# Patient Record
Sex: MALE
[De-identification: names, ages, dates, MRNs, and addresses within clinical notes are randomized per-mention and may not be internally consistent; named-entity substitution may affect disease eponyms.]

---

## 2017-05-18 ENCOUNTER — HOSPITAL (OUTPATIENT)
Dept: OTHER | Age: 72
End: 2017-05-18
Attending: SPECIALIST

## 2017-12-05 ENCOUNTER — HOSPITAL (OUTPATIENT)
Dept: OTHER | Age: 72
End: 2017-12-05
Attending: SPECIALIST

## 2018-01-01 ENCOUNTER — HOSPITAL (OUTPATIENT)
Dept: OTHER | Age: 73
End: 2018-01-01
Attending: SPECIALIST

## 2018-02-10 ENCOUNTER — HOSPITAL (OUTPATIENT)
Dept: OTHER | Age: 73
End: 2018-02-10
Attending: SPECIALIST

## 2018-04-10 ENCOUNTER — CHARTING TRANS (OUTPATIENT)
Dept: OTHER | Age: 73
End: 2018-04-10

## 2018-04-10 ENCOUNTER — IMAGING SERVICES (OUTPATIENT)
Dept: OTHER | Age: 73
End: 2018-04-10

## 2018-04-19 ENCOUNTER — HOSPITAL (OUTPATIENT)
Dept: OTHER | Age: 73
End: 2018-04-19
Attending: NEUROLOGICAL SURGERY

## 2018-05-01 ENCOUNTER — HOSPITAL (OUTPATIENT)
Dept: OTHER | Age: 73
End: 2018-05-01
Attending: NEUROLOGICAL SURGERY

## 2018-05-01 ENCOUNTER — CHARTING TRANS (OUTPATIENT)
Dept: OTHER | Age: 73
End: 2018-05-01

## 2018-05-22 ENCOUNTER — TELEPHONE (OUTPATIENT)
Dept: SURGERY | Facility: CLINIC | Age: 73
End: 2018-05-22

## 2018-05-22 ENCOUNTER — OFFICE VISIT (OUTPATIENT)
Dept: SURGERY | Facility: CLINIC | Age: 73
End: 2018-05-22

## 2018-05-22 VITALS — SYSTOLIC BLOOD PRESSURE: 132 MMHG | DIASTOLIC BLOOD PRESSURE: 82 MMHG | HEART RATE: 84 BPM

## 2018-05-22 DIAGNOSIS — G56.01 CARPAL TUNNEL SYNDROME OF RIGHT WRIST: Primary | ICD-10-CM

## 2018-05-22 DIAGNOSIS — G56.21 ULNAR NEUROPATHY AT ELBOW, RIGHT: ICD-10-CM

## 2018-05-22 DIAGNOSIS — E08.42 DIABETIC POLYNEUROPATHY ASSOCIATED WITH DIABETES MELLITUS DUE TO UNDERLYING CONDITION (HCC): ICD-10-CM

## 2018-05-22 DIAGNOSIS — G56.00 CARPAL TUNNEL SYNDROME, UNSPECIFIED LATERALITY: ICD-10-CM

## 2018-05-22 DIAGNOSIS — G56.20 ULNAR NEUROPATHY, UNSPECIFIED LATERALITY: ICD-10-CM

## 2018-05-22 PROBLEM — E11.40 DIABETIC NEUROPATHY (HCC): Status: ACTIVE | Noted: 2018-05-22

## 2018-05-22 PROCEDURE — 99243 OFF/OP CNSLTJ NEW/EST LOW 30: CPT | Performed by: NEUROLOGICAL SURGERY

## 2018-05-22 RX ORDER — DAPAGLIFLOZIN 10 MG/1
10 TABLET, FILM COATED ORAL DAILY
Refills: 5 | Status: ON HOLD | COMMUNITY
Start: 2018-05-02 | End: 2020-10-19

## 2018-05-22 RX ORDER — LOSARTAN POTASSIUM 100 MG/1
100 TABLET ORAL DAILY
Refills: 0 | COMMUNITY
End: 2020-03-31

## 2018-05-22 RX ORDER — FINASTERIDE 5 MG/1
5 TABLET, FILM COATED ORAL DAILY
Refills: 0 | COMMUNITY
End: 2020-01-22 | Stop reason: ALTCHOICE

## 2018-05-22 RX ORDER — INSULIN GLARGINE 300 U/ML
60 INJECTION, SOLUTION SUBCUTANEOUS EVERY MORNING
Refills: 1 | COMMUNITY
Start: 2018-04-09 | End: 2020-06-16

## 2018-05-22 RX ORDER — HYDROCODONE BITARTRATE AND ACETAMINOPHEN 5; 325 MG/1; MG/1
1 TABLET ORAL AS NEEDED
Refills: 0 | Status: ON HOLD | COMMUNITY
End: 2018-06-20

## 2018-05-22 RX ORDER — IBUPROFEN 800 MG/1
800 TABLET ORAL AS NEEDED
Refills: 0 | Status: ON HOLD | COMMUNITY
End: 2018-06-20

## 2018-05-22 RX ORDER — ERGOCALCIFEROL 1.25 MG/1
50000 CAPSULE ORAL WEEKLY
Refills: 3 | COMMUNITY
End: 2021-04-26

## 2018-05-22 RX ORDER — ATORVASTATIN CALCIUM 10 MG/1
10 TABLET, FILM COATED ORAL DAILY
Refills: 0 | COMMUNITY
End: 2020-03-31

## 2018-05-22 RX ORDER — CALCIPOTRIENE 50 UG/G
1 CREAM TOPICAL DAILY
Refills: 10 | COMMUNITY
Start: 2018-05-10 | End: 2019-05-14

## 2018-05-22 RX ORDER — INSULIN ASPART 100 [IU]/ML
20 INJECTION, SOLUTION INTRAVENOUS; SUBCUTANEOUS 2 TIMES DAILY
COMMUNITY
End: 2020-06-16

## 2018-05-22 NOTE — TELEPHONE ENCOUNTER
Called and spoke with patient  Discussed options  Wants emg of left arm  Order placed  Please schedule with pt  Pt to f/u the week after emg  Left message with son as well

## 2018-05-22 NOTE — H&P
Neurosurgery Clinic Visit  2018    Jazmine Figueroa PCP:  Preeti Shaver MD    1945 MRN [id number]       CHIEF COMPLAINT:  Patient presents with:  Neck Pain: NP      HISTORY OF PRESENT ILLNESS:  Jazmine Figueroa is a(n) 71year old male who w Vitro Strip TEST TID Disp:  Rfl: 3   Dulaglutide (TRULICITY SC) Inject into the skin once a week. Disp:  Rfl:    insulin aspart (NOVOLOG) 100 UNIT/ML Subcutaneous Solution Inject 20 Units into the skin 2 (two) times daily.  Disp:  Rfl:    aspirin 81 MG Oral Finger-thumb opposition   Left 5/5 5/5 5/5 5/5 5/5 5/5   Right 5/5 5/5 5/5 4+/5 4-/5 4-/5      Hip Flexion Knee Flexion Knee Extension Plantar- flexion Dorsi- flexion EHL   Left 5/5 5/5 5/5 5/5 5/5 5/5   Right 5/5 5/5 5/5 5/5 5/5 5/5     DTRs:   Biceps Tri agreement with the plan. Total time spent: 45 Minutes  Greater than 50% of the time was spent on counseling/coordination of care. Nature of education / counseling: Pathology, treatment options, and expected outcomes. Lacy Nj M.S., PAPreetiC

## 2018-05-22 NOTE — PROGRESS NOTES
Location of Pain: No neck pain, right hand weakness/grib strength    Date Pain Began: 6 months          Work Related:   No        Receiving Work Comp/Disability:   No    Numeric Rating Scale:  Pain at Present:  0

## 2018-05-22 NOTE — PATIENT INSTRUCTIONS
Refill policies:    • Allow 2-3 business days for refills; controlled substances may take longer.   • Contact your pharmacy at least 5 days prior to running out of medication and have them send an electronic request or submit request through the “request re entire amount billed. Precertification and Prior Authorizations: If your physician has recommended that you have a procedure or additional testing performed.   [facility]) will contact your insurance carrier to obtain pre-certi

## 2018-05-23 ENCOUNTER — TELEPHONE (OUTPATIENT)
Dept: SURGERY | Facility: CLINIC | Age: 73
End: 2018-05-23

## 2018-05-23 ENCOUNTER — APPOINTMENT (OUTPATIENT)
Dept: LAB | Age: 73
End: 2018-05-23
Attending: SPECIALIST
Payer: COMMERCIAL

## 2018-05-23 DIAGNOSIS — I10 HTN (HYPERTENSION): ICD-10-CM

## 2018-05-23 DIAGNOSIS — G56.00 CARPAL TUNNEL SYNDROME, UNSPECIFIED LATERALITY: Primary | ICD-10-CM

## 2018-05-23 DIAGNOSIS — G56.20 ULNAR NEUROPATHY, UNSPECIFIED LATERALITY: ICD-10-CM

## 2018-05-23 DIAGNOSIS — E78.2 MIXED HYPERLIPIDEMIA: ICD-10-CM

## 2018-05-23 DIAGNOSIS — E11.65 TYPE II DIABETES MELLITUS, UNCONTROLLED (HCC): ICD-10-CM

## 2018-05-23 PROCEDURE — 80053 COMPREHEN METABOLIC PANEL: CPT

## 2018-05-23 PROCEDURE — 82043 UR ALBUMIN QUANTITATIVE: CPT

## 2018-05-23 PROCEDURE — 82570 ASSAY OF URINE CREATININE: CPT

## 2018-05-23 PROCEDURE — 36415 COLL VENOUS BLD VENIPUNCTURE: CPT

## 2018-05-23 PROCEDURE — 80061 LIPID PANEL: CPT

## 2018-05-23 PROCEDURE — 83036 HEMOGLOBIN GLYCOSYLATED A1C: CPT

## 2018-05-23 NOTE — TELEPHONE ENCOUNTER
You are scheduled for RIGHT CARPAL TUNNEL RELEASE AND ULNAR DECOMPRESSION, POSSIBLE TRANSPOSITION AND ALL INDICATED PROCEDURES on 6/20/18 with .     Pre-op instructions discussed with patient and surgical packet provided:       · You will need to you should avoid pressure to the palm/wrist.       Patient has Ryan Jarvis. PA needed.   CPT: [id number], [id number]  ICD-10: G56.01, G56.21

## 2018-05-23 NOTE — TELEPHONE ENCOUNTER
Will need to verify with  if patient needs to obtain PCP clearance. Patient would also like below instructions to be printed out and will pick this up on Friday. Instructions placed at the .

## 2018-05-25 NOTE — TELEPHONE ENCOUNTER
Pt returned call informed him of message below, pt had questions regarding upcoming sx.     Pt is worried about post operative pain, informed him medication will be given to help him with this   Also informed pt to keep wrist elevated, and not place it in d

## 2018-05-30 NOTE — TELEPHONE ENCOUNTER
Called pt & scheduled post op for 07/02 w/Jackeline; pt also requested an appt prior to sx so appt scheduled for 06/14 w/Dilia.

## 2018-06-01 ENCOUNTER — HOSPITAL (OUTPATIENT)
Dept: OTHER | Age: 73
End: 2018-06-01
Attending: NEUROLOGICAL SURGERY

## 2018-06-07 ENCOUNTER — LAB ENCOUNTER (OUTPATIENT)
Dept: LAB | Age: 73
End: 2018-06-07
Attending: SPECIALIST
Payer: COMMERCIAL

## 2018-06-07 DIAGNOSIS — R94.31 NONSPECIFIC ABNORMAL ELECTROCARDIOGRAM (ECG) (EKG): Primary | ICD-10-CM

## 2018-06-07 PROCEDURE — 36415 COLL VENOUS BLD VENIPUNCTURE: CPT

## 2018-06-07 PROCEDURE — 85610 PROTHROMBIN TIME: CPT

## 2018-06-07 PROCEDURE — 85730 THROMBOPLASTIN TIME PARTIAL: CPT

## 2018-06-07 PROCEDURE — 85025 COMPLETE CBC W/AUTO DIFF WBC: CPT

## 2018-06-08 ENCOUNTER — TELEPHONE (OUTPATIENT)
Dept: SURGERY | Facility: CLINIC | Age: 73
End: 2018-06-08

## 2018-06-08 NOTE — TELEPHONE ENCOUNTER
Spoke with patient who stated he has a cardiac test scheduled for 6/13/18 and that he already completed his pre-op bloodwork. Patient will be f/u in the office with  prior to surgery on 6/14/18. No further questions.     Awaiting medical clear

## 2018-06-13 ENCOUNTER — HOSPITAL ENCOUNTER (OUTPATIENT)
Dept: CV DIAGNOSTICS | Facility: HOSPITAL | Age: 73
Discharge: HOME | End: 2018-06-13
Attending: SPECIALIST
Payer: COMMERCIAL

## 2018-06-13 DIAGNOSIS — R94.31 NONSPECIFIC ABNORMAL ELECTROCARDIOGRAM (ECG) (EKG): ICD-10-CM

## 2018-06-13 DIAGNOSIS — R94.31 ABNORMAL EKG: ICD-10-CM

## 2018-06-13 DIAGNOSIS — Z01.810 PREOP CARDIOVASCULAR EXAM: ICD-10-CM

## 2018-06-13 PROCEDURE — 78452 HT MUSCLE IMAGE SPECT MULT: CPT | Performed by: SPECIALIST

## 2018-06-13 PROCEDURE — 93018 CV STRESS TEST I&R ONLY: CPT | Performed by: SPECIALIST

## 2018-06-13 PROCEDURE — 93017 CV STRESS TEST TRACING ONLY: CPT | Performed by: SPECIALIST

## 2018-06-14 ENCOUNTER — OFFICE VISIT (OUTPATIENT)
Dept: SURGERY | Facility: CLINIC | Age: 73
End: 2018-06-14

## 2018-06-14 VITALS — DIASTOLIC BLOOD PRESSURE: 60 MMHG | HEART RATE: 80 BPM | SYSTOLIC BLOOD PRESSURE: 130 MMHG

## 2018-06-14 DIAGNOSIS — G56.00 CARPAL TUNNEL SYNDROME, UNSPECIFIED LATERALITY: Primary | ICD-10-CM

## 2018-06-14 DIAGNOSIS — G56.20 ULNAR NEUROPATHY, UNSPECIFIED LATERALITY: ICD-10-CM

## 2018-06-14 PROCEDURE — 99213 OFFICE O/P EST LOW 20 MIN: CPT | Performed by: NEUROLOGICAL SURGERY

## 2018-06-14 NOTE — PATIENT INSTRUCTIONS
Refill policies:    • Allow 2-3 business days for refills; controlled substances may take longer.   • Contact your pharmacy at least 5 days prior to running out of medication and have them send an electronic request or submit request through the “request re entire amount billed. Precertification and Prior Authorizations: If your physician has recommended that you have a procedure or additional testing performed.   [facility]) will contact your insurance carrier to obtain pre-certi

## 2018-06-14 NOTE — TELEPHONE ENCOUNTER
Attempted to speak with PCP-'s office. Per 's office patient was seen, but notes not yet complete. Office fax # was provided and will fax once complete. Awaiting PCP clearance note.

## 2018-06-14 NOTE — PROGRESS NOTES
Neurosurgery Clinic Visit  2018    Tariq Campos PCP:  Nikole Goodwin MD    1945 MRN [id number]     HISTORY OF PRESENT ILLNESS:  Velia Smith is a(n) 71year old male here with carpal tunnel as well as ulnar neuropathy  He is here to d

## 2018-06-15 NOTE — TELEPHONE ENCOUNTER
Spoke with 's office regarding clearance. Who stated note still not yet completed. Informed her surgery is next week. They will relay message to Rogelio Guzman. Awaiting clearance.

## 2018-06-18 NOTE — TELEPHONE ENCOUNTER
Patient states he called his PCP office and they are going to inform Dr. Federico Johnson about the clearance for surgery.

## 2018-06-18 NOTE — TELEPHONE ENCOUNTER
Called Dr. Brannon Pandey, Spoke to Brennon informed them we are still waiting on patient' medical clearance for surgery on Wednesday. Provided fax # to our office and requested they fax Medical clearance as son as possible.      Called pt informed hi

## 2018-06-19 NOTE — TELEPHONE ENCOUNTER
Letter stating medically cleared for surgery faxed to Pre-admission  Nothing further needed for this surgery

## 2018-06-20 ENCOUNTER — HOSPITAL ENCOUNTER (OUTPATIENT)
Facility: HOSPITAL | Age: 73
Setting detail: HOSPITAL OUTPATIENT SURGERY
Discharge: HOME | End: 2018-06-20
Attending: NEUROLOGICAL SURGERY | Admitting: NEUROLOGICAL SURGERY
Payer: COMMERCIAL

## 2018-06-20 ENCOUNTER — ANESTHESIA EVENT (OUTPATIENT)
Dept: SURGERY | Facility: HOSPITAL | Age: 73
End: 2018-06-20

## 2018-06-20 ENCOUNTER — SURGERY (OUTPATIENT)
Age: 73
End: 2018-06-20

## 2018-06-20 ENCOUNTER — ANESTHESIA (OUTPATIENT)
Dept: SURGERY | Facility: HOSPITAL | Age: 73
End: 2018-06-20

## 2018-06-20 VITALS
DIASTOLIC BLOOD PRESSURE: 51 MMHG | BODY MASS INDEX: 37.52 KG/M2 | HEART RATE: 81 BPM | OXYGEN SATURATION: 93 % | SYSTOLIC BLOOD PRESSURE: 106 MMHG | WEIGHT: 219.81 LBS | RESPIRATION RATE: 16 BRPM | HEIGHT: 64 IN | TEMPERATURE: 97 F

## 2018-06-20 DIAGNOSIS — G56.20 ULNAR NEUROPATHY, UNSPECIFIED LATERALITY: ICD-10-CM

## 2018-06-20 DIAGNOSIS — G56.00 CARPAL TUNNEL SYNDROME, UNSPECIFIED LATERALITY: ICD-10-CM

## 2018-06-20 PROCEDURE — 82962 GLUCOSE BLOOD TEST: CPT

## 2018-06-20 PROCEDURE — 01N50ZZ RELEASE MEDIAN NERVE, OPEN APPROACH: ICD-10-PCS | Performed by: NEUROLOGICAL SURGERY

## 2018-06-20 RX ORDER — BUPIVACAINE HYDROCHLORIDE 2.5 MG/ML
INJECTION, SOLUTION EPIDURAL; INFILTRATION; INTRACAUDAL AS NEEDED
Status: DISCONTINUED | OUTPATIENT
Start: 2018-06-20 | End: 2018-06-20 | Stop reason: HOSPADM

## 2018-06-20 RX ORDER — DIPHENHYDRAMINE HYDROCHLORIDE 50 MG/ML
12.5 INJECTION INTRAMUSCULAR; INTRAVENOUS AS NEEDED
Status: DISCONTINUED | OUTPATIENT
Start: 2018-06-20 | End: 2018-06-20

## 2018-06-20 RX ORDER — NALOXONE HYDROCHLORIDE 0.4 MG/ML
80 INJECTION, SOLUTION INTRAMUSCULAR; INTRAVENOUS; SUBCUTANEOUS AS NEEDED
Status: DISCONTINUED | OUTPATIENT
Start: 2018-06-20 | End: 2018-06-20

## 2018-06-20 RX ORDER — CEPHALEXIN 500 MG/1
500 CAPSULE ORAL 4 TIMES DAILY
Qty: 12 CAPSULE | Refills: 0 | Status: SHIPPED | OUTPATIENT
Start: 2018-06-20 | End: 2018-06-23

## 2018-06-20 RX ORDER — MEPERIDINE HYDROCHLORIDE 25 MG/ML
12.5 INJECTION INTRAMUSCULAR; INTRAVENOUS; SUBCUTANEOUS AS NEEDED
Status: DISCONTINUED | OUTPATIENT
Start: 2018-06-20 | End: 2018-06-20

## 2018-06-20 RX ORDER — ONDANSETRON 2 MG/ML
4 INJECTION INTRAMUSCULAR; INTRAVENOUS AS NEEDED
Status: DISCONTINUED | OUTPATIENT
Start: 2018-06-20 | End: 2018-06-20

## 2018-06-20 RX ORDER — HYDROCODONE BITARTRATE AND ACETAMINOPHEN 5; 325 MG/1; MG/1
1-2 TABLET ORAL EVERY 4 HOURS PRN
Qty: 30 TABLET | Refills: 0 | Status: SHIPPED | OUTPATIENT
Start: 2018-06-20 | End: 2018-07-02

## 2018-06-20 RX ORDER — HYDROMORPHONE HYDROCHLORIDE 1 MG/ML
0.4 INJECTION, SOLUTION INTRAMUSCULAR; INTRAVENOUS; SUBCUTANEOUS EVERY 5 MIN PRN
Status: DISCONTINUED | OUTPATIENT
Start: 2018-06-20 | End: 2018-06-20

## 2018-06-20 RX ORDER — DEXTROSE MONOHYDRATE 25 G/50ML
50 INJECTION, SOLUTION INTRAVENOUS
Status: DISCONTINUED | OUTPATIENT
Start: 2018-06-20 | End: 2018-06-20 | Stop reason: HOSPADM

## 2018-06-20 RX ORDER — SODIUM CHLORIDE, SODIUM LACTATE, POTASSIUM CHLORIDE, CALCIUM CHLORIDE 600; 310; 30; 20 MG/100ML; MG/100ML; MG/100ML; MG/100ML
INJECTION, SOLUTION INTRAVENOUS CONTINUOUS
Status: DISCONTINUED | OUTPATIENT
Start: 2018-06-20 | End: 2018-06-20

## 2018-06-20 RX ORDER — ACETAMINOPHEN 500 MG
1000 TABLET ORAL ONCE
Status: DISCONTINUED | OUTPATIENT
Start: 2018-06-20 | End: 2018-06-20 | Stop reason: HOSPADM

## 2018-06-20 RX ORDER — DEXTROSE MONOHYDRATE 25 G/50ML
50 INJECTION, SOLUTION INTRAVENOUS
Status: DISCONTINUED | OUTPATIENT
Start: 2018-06-20 | End: 2018-06-20

## 2018-06-20 RX ORDER — CEFAZOLIN SODIUM/WATER 2 G/20 ML
SYRINGE (ML) INTRAVENOUS
Status: DISCONTINUED
Start: 2018-06-20 | End: 2018-06-20

## 2018-06-20 RX ORDER — MIDAZOLAM HYDROCHLORIDE 1 MG/ML
1 INJECTION INTRAMUSCULAR; INTRAVENOUS EVERY 5 MIN PRN
Status: DISCONTINUED | OUTPATIENT
Start: 2018-06-20 | End: 2018-06-20

## 2018-06-20 RX ORDER — CEFAZOLIN SODIUM/WATER 2 G/20 ML
2 SYRINGE (ML) INTRAVENOUS ONCE
Status: DISCONTINUED | OUTPATIENT
Start: 2018-06-20 | End: 2018-06-20 | Stop reason: HOSPADM

## 2018-06-20 NOTE — BRIEF OP NOTE
Pre-Operative Diagnosis: Carpal tunnel syndrome, unspecified laterality [G56.00]  Ulnar neuropathy, unspecified laterality [G56.20]     Post-Operative Diagnosis: Carpal tunnel syndrome, unspecified laterality [G56.00]Ulnar neuropathy, unspecified lateralit

## 2018-06-20 NOTE — INTERVAL H&P NOTE
Pre-op Diagnosis: Carpal tunnel syndrome, unspecified laterality [G56.00]  Ulnar neuropathy, unspecified laterality [G56.20]    The above referenced H&P was reviewed by Tia iGllis PA-C on 6/20/2018, the patient was examined and no significant changes

## 2018-06-20 NOTE — OPERATIVE REPORT
Operative Note    Patient Name: [place] Kaplan[place]    Preoperative Diagnosis: Carpal tunnel syndrome, unspecified laterality [G56.00]  Ulnar neuropathy, unspecified laterality [G56.20]    Postoperative Diagnosis: same    Primary Surgeon: Barb Joy

## 2018-06-20 NOTE — H&P (VIEW-ONLY)
Neurosurgery Clinic Visit  2018    Gogo Xiao PCP:  Mark Anthony Thomas MD    1945 MRN [id number]       CHIEF COMPLAINT:  Patient presents with:  Neck Pain: NP      HISTORY OF PRESENT ILLNESS:  Gogo Xiao is a(n) 71year old male who w Vitro Strip TEST TID Disp:  Rfl: 3   Dulaglutide (TRULICITY SC) Inject into the skin once a week. Disp:  Rfl:    insulin aspart (NOVOLOG) 100 UNIT/ML Subcutaneous Solution Inject 20 Units into the skin 2 (two) times daily.  Disp:  Rfl:    aspirin 81 MG Oral Finger-thumb opposition   Left 5/5 5/5 5/5 5/5 5/5 5/5   Right 5/5 5/5 5/5 4+/5 4-/5 4-/5      Hip Flexion Knee Flexion Knee Extension Plantar- flexion Dorsi- flexion EHL   Left 5/5 5/5 5/5 5/5 5/5 5/5   Right 5/5 5/5 5/5 5/5 5/5 5/5     DTRs:   Biceps Tri agreement with the plan. Total time spent: 45 Minutes  Greater than 50% of the time was spent on counseling/coordination of care. Nature of education / counseling: Pathology, treatment options, and expected outcomes. Lacy Gallo M.S., PA-C

## 2018-06-20 NOTE — ANESTHESIA PREPROCEDURE EVALUATION
PRE-OP EVALUATION    Patient Name: Charles Ramsay Edith    Pre-op Diagnosis: Carpal tunnel syndrome, unspecified laterality [G56.00]  Ulnar neuropathy, unspecified laterality [G56.20]    Procedure(s):  RIGHT CARPAL TUNNEL RELEASE AND ULNAR DECOMPRESSION, POS myocardial perfusion scan. 2.    Normal left ventricular wall motion. 3.    Left ventricular ejection fraction of 54%. 4.   No previous study for comparison available.        ECG reviewed.           (+) obesity  (+) hypertension   (+) hyperlipidemia spouse                Present on Admission:  **None**

## 2018-06-20 NOTE — ANESTHESIA POSTPROCEDURE EVALUATION
Archie Arias Patient Status:  Outpatient in a Bed   Age/Gender 71year old male MRN [id number]   [facility] SURGERY Attending Bryan German MD   Hosp Day # 0 PCP Aayush Smart MD       Anesthesia Post-op Note

## 2018-07-02 ENCOUNTER — OFFICE VISIT (OUTPATIENT)
Dept: SURGERY | Facility: CLINIC | Age: 73
End: 2018-07-02

## 2018-07-02 VITALS — HEART RATE: 68 BPM | DIASTOLIC BLOOD PRESSURE: 74 MMHG | SYSTOLIC BLOOD PRESSURE: 140 MMHG

## 2018-07-02 DIAGNOSIS — G56.20 ULNAR NEUROPATHY, UNSPECIFIED LATERALITY: ICD-10-CM

## 2018-07-02 DIAGNOSIS — G56.00 CARPAL TUNNEL SYNDROME, UNSPECIFIED LATERALITY: Primary | ICD-10-CM

## 2018-07-02 PROCEDURE — 99024 POSTOP FOLLOW-UP VISIT: CPT | Performed by: PHYSICIAN ASSISTANT

## 2018-07-02 NOTE — PROGRESS NOTES
Neurosurgery Clinic Visit  2018    Tariq Wadsworth PCP:  Olimpia Molina MD    1945 MRN [id number]     HISTORY OF PRESENT ILLNESS:  Mariela Butler FAITH Wadsworth is a(n) 71year old male who is here 2 weeks s/p right carpal tunnel release and ulnar ner

## 2018-07-02 NOTE — PROGRESS NOTES
Pt is here for follow up. Regarding  Sx on 6/20/2018    Denies any pain, states never had pain after sx.   incisions look well, no drainage, no redness, no swelling

## 2018-07-02 NOTE — PATIENT INSTRUCTIONS
Refill policies:    • Allow 2-3 business days for refills; controlled substances may take longer.   • Contact your pharmacy at least 5 days prior to running out of medication and have them send an electronic request or submit request through the “request re entire amount billed. Precertification and Prior Authorizations: If your physician has recommended that you have a procedure or additional testing performed.   [facility]) will contact your insurance carrier to obtain pre-certi

## 2018-08-02 ENCOUNTER — PROCEDURE VISIT (OUTPATIENT)
Dept: NEUROLOGY | Facility: CLINIC | Age: 73
End: 2018-08-02

## 2018-08-02 DIAGNOSIS — G56.22 ULNAR NEUROPATHY OF LEFT UPPER EXTREMITY: Primary | ICD-10-CM

## 2018-08-02 DIAGNOSIS — G56.02 CARPAL TUNNEL SYNDROME OF LEFT WRIST: ICD-10-CM

## 2018-08-02 PROCEDURE — 95909 NRV CNDJ TST 5-6 STUDIES: CPT | Performed by: OTHER

## 2018-08-02 PROCEDURE — 95886 MUSC TEST DONE W/N TEST COMP: CPT | Performed by: OTHER

## 2018-08-02 NOTE — PATIENT INSTRUCTIONS
Refill policies:    • Allow 2-3 business days for refills; controlled substances may take longer.   • Contact your pharmacy at least 5 days prior to running out of medication and have them send an electronic request or submit request through the “request re entire amount billed. Precertification and Prior Authorizations: If your physician has recommended that you have a procedure or additional testing performed.   [facility]) will contact your insurance carrier to obtain pre-certi

## 2018-08-03 ENCOUNTER — OFFICE VISIT (OUTPATIENT)
Dept: SURGERY | Facility: CLINIC | Age: 73
End: 2018-08-03

## 2018-08-03 VITALS — DIASTOLIC BLOOD PRESSURE: 70 MMHG | HEART RATE: 80 BPM | SYSTOLIC BLOOD PRESSURE: 140 MMHG

## 2018-08-03 DIAGNOSIS — G56.20 ULNAR NEUROPATHY, UNSPECIFIED LATERALITY: ICD-10-CM

## 2018-08-03 DIAGNOSIS — G56.00 CARPAL TUNNEL SYNDROME, UNSPECIFIED LATERALITY: Primary | ICD-10-CM

## 2018-08-03 DIAGNOSIS — M47.22 CERVICAL SPONDYLOSIS WITH RADICULOPATHY: ICD-10-CM

## 2018-08-03 PROCEDURE — 99024 POSTOP FOLLOW-UP VISIT: CPT | Performed by: PHYSICIAN ASSISTANT

## 2018-08-03 NOTE — PATIENT INSTRUCTIONS
Refill policies:    • Allow 2-3 business days for refills; controlled substances may take longer.   • Contact your pharmacy at least 5 days prior to running out of medication and have them send an electronic request or submit request through the “request re entire amount billed. Precertification and Prior Authorizations: If your physician has recommended that you have a procedure or additional testing performed.   [facility]) will contact your insurance carrier to obtain pre-certi

## 2018-08-03 NOTE — PROGRESS NOTES
Neurosurgery Clinic Visit  8/3/2018    Tariq Wadsworth PCP:  Olimpia Molina MD    1945 MRN [id number]     HISTORY OF PRESENT ILLNESS:  Mariela Butler FAITH Wadsworth is a(n) 68year old male who is here 6 weeks s/p right carpal tunnel release and ulnar ner education / counseling: Pathology, treatment options, and expected outcomes. Lacy Short M.S., PA-C  [place] [id number] [facility], [address] [place] [address] Erich Rangel, [address] [place] [address]  [phone]  8/3/2018  3:13 PM

## 2018-08-06 NOTE — PROCEDURES
[facility]  [address] [facility]  Phone- [phone]  Nerve Conduction & Electromyography Report            Patient: Joni Fields Age: 68 Years 0 Months  Patient ID: [id number] Referring M.D.: Hina Basilio IA Fib PSW Fasc H.F. Amp Dur. PPP Pattern   L. DELTOID 1+ 1+ None None None N N N N   L. TRICEPS N None None None None N N N N   L. BICEPS N None None None None N N N N   L.  FLEX CARPI RAD N None None None None N N N N   L. FIRST D INTEROSS 1+ 2+ 1+ None

## 2018-08-27 ENCOUNTER — APPOINTMENT (OUTPATIENT)
Dept: PHYSICAL THERAPY | Age: 73
End: 2018-08-27
Attending: PHYSICIAN ASSISTANT
Payer: COMMERCIAL

## 2018-08-28 ENCOUNTER — TELEPHONE (OUTPATIENT)
Dept: SURGERY | Facility: CLINIC | Age: 73
End: 2018-08-28

## 2018-08-28 ENCOUNTER — APPOINTMENT (OUTPATIENT)
Dept: PHYSICAL THERAPY | Age: 73
End: 2018-08-28
Attending: SPECIALIST
Payer: COMMERCIAL

## 2018-08-28 ENCOUNTER — TELEPHONE (OUTPATIENT)
Dept: PHYSICAL THERAPY | Facility: HOSPITAL | Age: 73
End: 2018-08-28

## 2018-08-28 NOTE — TELEPHONE ENCOUNTER
Spoke with patient and informed him I do see the referral in the system that states both PT and OT. Informed patient I would contact his insurance to clarify and make sure this is accurate. Patient understood.      Spoke with [facility] who verified this r

## 2018-08-28 NOTE — TELEPHONE ENCOUNTER
Pt calling, states he needs the referral to include Occupational Therapy, his insurance states the referral does not include OT, please call back

## 2018-08-29 ENCOUNTER — LAB ENCOUNTER (OUTPATIENT)
Dept: LAB | Age: 73
End: 2018-08-29
Attending: SPECIALIST
Payer: COMMERCIAL

## 2018-08-29 DIAGNOSIS — E11.9 DM2 (DIABETES MELLITUS, TYPE 2) (HCC): Primary | ICD-10-CM

## 2018-08-29 DIAGNOSIS — I10 HTN (HYPERTENSION): ICD-10-CM

## 2018-08-29 DIAGNOSIS — E78.2 MIXED HYPERLIPIDEMIA: ICD-10-CM

## 2018-08-29 LAB
ALBUMIN SERPL-MCNC: 3.2 G/DL (ref 3.5–4.8)
ALBUMIN/GLOB SERPL: 0.8 {RATIO} (ref 1–2)
ALP LIVER SERPL-CCNC: 79 U/L (ref 45–117)
ALT SERPL-CCNC: 31 U/L (ref 17–63)
ANION GAP SERPL CALC-SCNC: 6 MMOL/L (ref 0–18)
AST SERPL-CCNC: 24 U/L (ref 15–41)
BILIRUB SERPL-MCNC: 0.5 MG/DL (ref 0.1–2)
BUN BLD-MCNC: 18 MG/DL (ref 8–20)
BUN/CREAT SERPL: 19.1 (ref 10–20)
CALCIUM BLD-MCNC: 8.4 MG/DL (ref 8.3–10.3)
CHLORIDE SERPL-SCNC: 108 MMOL/L (ref 101–111)
CHOLEST SMN-MCNC: 110 MG/DL (ref ?–200)
CO2 SERPL-SCNC: 28 MMOL/L (ref 22–32)
CREAT BLD-MCNC: 0.94 MG/DL (ref 0.7–1.3)
EST. AVERAGE GLUCOSE BLD GHB EST-MCNC: 160 MG/DL (ref 68–126)
GLOBULIN PLAS-MCNC: 4.2 G/DL (ref 2.5–4)
GLUCOSE BLD-MCNC: 60 MG/DL (ref 70–99)
HBA1C MFR BLD HPLC: 7.2 % (ref ?–5.7)
HDLC SERPL-MCNC: 35 MG/DL (ref 40–59)
LDLC SERPL CALC-MCNC: 38 MG/DL (ref ?–100)
M PROTEIN MFR SERPL ELPH: 7.4 G/DL (ref 6.1–8.3)
NONHDLC SERPL-MCNC: 75 MG/DL (ref ?–130)
OSMOLALITY SERPL CALC.SUM OF ELEC: 294 MOSM/KG (ref 275–295)
POTASSIUM SERPL-SCNC: 3.8 MMOL/L (ref 3.6–5.1)
SODIUM SERPL-SCNC: 142 MMOL/L (ref 136–144)
TRIGL SERPL-MCNC: 183 MG/DL (ref 30–149)
VLDLC SERPL CALC-MCNC: 37 MG/DL (ref 0–30)

## 2018-08-29 PROCEDURE — 83036 HEMOGLOBIN GLYCOSYLATED A1C: CPT

## 2018-08-29 PROCEDURE — 80061 LIPID PANEL: CPT

## 2018-08-29 PROCEDURE — 80053 COMPREHEN METABOLIC PANEL: CPT

## 2018-08-29 PROCEDURE — 36415 COLL VENOUS BLD VENIPUNCTURE: CPT

## 2018-08-30 ENCOUNTER — APPOINTMENT (OUTPATIENT)
Dept: PHYSICAL THERAPY | Age: 73
End: 2018-08-30
Attending: SPECIALIST
Payer: COMMERCIAL

## 2018-08-30 ENCOUNTER — APPOINTMENT (OUTPATIENT)
Dept: PHYSICAL THERAPY | Age: 73
End: 2018-08-30
Attending: PHYSICIAN ASSISTANT
Payer: COMMERCIAL

## 2018-08-30 ENCOUNTER — APPOINTMENT (OUTPATIENT)
Dept: LAB | Age: 73
End: 2018-08-30
Attending: SPECIALIST
Payer: COMMERCIAL

## 2018-08-30 DIAGNOSIS — E11.9 DM2 (DIABETES MELLITUS, TYPE 2) (HCC): ICD-10-CM

## 2018-08-30 DIAGNOSIS — I10 HTN (HYPERTENSION): ICD-10-CM

## 2018-08-30 LAB
CREAT UR-SCNC: 55.4 MG/DL
MICROALBUMIN UR-MCNC: 31 MG/DL
MICROALBUMIN/CREAT 24H UR-RTO: 559.6 UG/MG (ref ?–30)

## 2018-08-30 PROCEDURE — 82570 ASSAY OF URINE CREATININE: CPT

## 2018-08-30 PROCEDURE — 82043 UR ALBUMIN QUANTITATIVE: CPT

## 2018-09-06 ENCOUNTER — APPOINTMENT (OUTPATIENT)
Dept: PHYSICAL THERAPY | Age: 73
End: 2018-09-06
Attending: PHYSICIAN ASSISTANT
Payer: COMMERCIAL

## 2018-09-10 ENCOUNTER — APPOINTMENT (OUTPATIENT)
Dept: PHYSICAL THERAPY | Age: 73
End: 2018-09-10
Attending: PHYSICIAN ASSISTANT
Payer: COMMERCIAL

## 2018-09-13 ENCOUNTER — APPOINTMENT (OUTPATIENT)
Dept: PHYSICAL THERAPY | Age: 73
End: 2018-09-13
Attending: PHYSICIAN ASSISTANT
Payer: COMMERCIAL

## 2018-09-17 ENCOUNTER — APPOINTMENT (OUTPATIENT)
Dept: PHYSICAL THERAPY | Age: 73
End: 2018-09-17
Attending: PHYSICIAN ASSISTANT
Payer: COMMERCIAL

## 2018-09-20 ENCOUNTER — APPOINTMENT (OUTPATIENT)
Dept: PHYSICAL THERAPY | Age: 73
End: 2018-09-20
Attending: PHYSICIAN ASSISTANT
Payer: COMMERCIAL

## 2018-09-24 ENCOUNTER — HOSPITAL ENCOUNTER (OUTPATIENT)
Dept: PHYSICAL THERAPY | Facility: HOSPITAL | Age: 73
Setting detail: THERAPIES SERIES
Discharge: HOME | End: 2018-09-24
Attending: Other
Payer: COMMERCIAL

## 2018-09-24 DIAGNOSIS — M47.22 CERVICAL SPONDYLOSIS WITH RADICULOPATHY: ICD-10-CM

## 2018-09-24 DIAGNOSIS — G56.00 CARPAL TUNNEL SYNDROME, UNSPECIFIED LATERALITY: ICD-10-CM

## 2018-09-24 DIAGNOSIS — G56.20 ULNAR NEUROPATHY, UNSPECIFIED LATERALITY: ICD-10-CM

## 2018-09-24 PROCEDURE — 97163 PT EVAL HIGH COMPLEX 45 MIN: CPT

## 2018-09-24 PROCEDURE — 97110 THERAPEUTIC EXERCISES: CPT

## 2018-09-24 NOTE — PROGRESS NOTES
SPINE EVALUATION:   Referring Physician: Dr. Ngoc Lima  Diagnosis: Cervical spondylosis with radiculopathy     Date of Service: 9/24/2018     PATIENT SUMMARY   Tariq Mccullough Scarce [facility]) is a 68year old male who presents to therapy today with complain past 3 years or so). Pt goals include to gain flexibility, return to golfing, cycling, gardening, and playing with his grandkids, be able to look over shoulders easier. Past medical history was reviewed with Tariq.  Significant findings include:   Past Crews's, and inverted supinator sign next session    Cervical AROM:   Flexion: 43 deg with pain  Extension: 25 deg  Sidebending: R 12 with pain; L 18   Rotation: R 43 deg with pain; L 45 deg with pain    Shoulder AROM:  Flexion: R 122 with pain; L 160  A cabinets and to the top of his head. 4. Patient will report improved ability to sleep at night due to dec pain. 5. Patient will be independent and compliant in HEP to maintain progress made in PT.       Frequency / Duration: Patient will be seen for 2 x

## 2018-09-25 DIAGNOSIS — G56.23 ULNAR NEUROPATHY OF BOTH UPPER EXTREMITIES: ICD-10-CM

## 2018-09-25 DIAGNOSIS — M47.22 OSTEOARTHRITIS OF SPINE WITH RADICULOPATHY, CERVICAL REGION: ICD-10-CM

## 2018-09-25 DIAGNOSIS — G56.03 CARPAL TUNNEL SYNDROME ON BOTH SIDES: Primary | ICD-10-CM

## 2018-09-26 ENCOUNTER — HOSPITAL ENCOUNTER (OUTPATIENT)
Dept: PHYSICAL THERAPY | Facility: HOSPITAL | Age: 73
Setting detail: THERAPIES SERIES
Discharge: HOME | End: 2018-09-26
Attending: Other
Payer: COMMERCIAL

## 2018-09-26 PROCEDURE — 97110 THERAPEUTIC EXERCISES: CPT

## 2018-09-26 PROCEDURE — 97140 MANUAL THERAPY 1/> REGIONS: CPT

## 2018-09-26 NOTE — PROGRESS NOTES
Dx: Cervical spondylosis with radiculopathy           Authorized # of Visits:  10 requested (8 approved per HMO)         Next MD visit: none scheduled  Fall Risk: standard         Precautions: n/a             Subjective: Was able to do his HEP, no issue, a 12         Seated thoracic ext over ball x 10         Standing threading at wall, R only x 12         LTR during passive pec stretching         Manual x 15 min  - SO, cervical paraspinal STM  - cervical rot and SB PROM, stretching  - AAROM, MWM rot x 15R/L

## 2018-10-01 ENCOUNTER — HOSPITAL ENCOUNTER (OUTPATIENT)
Dept: OCCUPATIONAL MEDICINE | Facility: HOSPITAL | Age: 73
Setting detail: THERAPIES SERIES
Discharge: HOME | End: 2018-10-01
Attending: Other
Payer: COMMERCIAL

## 2018-10-01 ENCOUNTER — HOSPITAL ENCOUNTER (OUTPATIENT)
Dept: PHYSICAL THERAPY | Facility: HOSPITAL | Age: 73
Setting detail: THERAPIES SERIES
Discharge: HOME | End: 2018-10-01
Attending: Other
Payer: COMMERCIAL

## 2018-10-01 DIAGNOSIS — G56.23 ULNAR NEUROPATHY OF BOTH UPPER EXTREMITIES: ICD-10-CM

## 2018-10-01 DIAGNOSIS — G56.00 CARPAL TUNNEL SYNDROME, UNSPECIFIED LATERALITY: ICD-10-CM

## 2018-10-01 DIAGNOSIS — G56.03 CARPAL TUNNEL SYNDROME ON BOTH SIDES: ICD-10-CM

## 2018-10-01 DIAGNOSIS — M47.22 OSTEOARTHRITIS OF SPINE WITH RADICULOPATHY, CERVICAL REGION: ICD-10-CM

## 2018-10-01 DIAGNOSIS — G56.20 ULNAR NEUROPATHY, UNSPECIFIED LATERALITY: ICD-10-CM

## 2018-10-01 PROCEDURE — 97166 OT EVAL MOD COMPLEX 45 MIN: CPT

## 2018-10-01 PROCEDURE — 97110 THERAPEUTIC EXERCISES: CPT

## 2018-10-01 PROCEDURE — 97140 MANUAL THERAPY 1/> REGIONS: CPT

## 2018-10-01 NOTE — PROGRESS NOTES
Dx: Cervical spondylosis with radiculopathy           Authorized # of Visits:  10 requested (8 approved per HMO)         Next MD visit: none scheduled  Fall Risk: standard         Precautions: n/a             Subjective: Patient reports has been pretty com tolerated. Cervical and thoracic mobs.    Date: 9/26/2018 TX#: 2/8 Date: 10/1/18    TX#: 3/8   Date:               TX#: 4/ Date:               TX#: 5/ Date:               TX#: 6/ Date:               TX#: 7/ Date:               TX#: 8/   Pulleys  - flexion x

## 2018-10-01 NOTE — PROGRESS NOTES
OCCUPATIONAL THERAPY UPPER EXTREMITY EVALUATION   Referring Physician: Dr. Ayanna Cervantes  Diagnosis: R S/P CTR Bilateral CTS(G56.03) and R Elbow Ulnar release Bilateral Ulnar neuropathy (X42.41)     Date of Service: 10/1/2018     PATIENT SUMMARY   Avery Mc below  Shoulder 50% WNL Elbow Wrist    Flexion: R and L =WNL  Extension: R and L =WNL  Supination: R=65 , L=70   Pronation: R=WNL , L=WNL  Flexion: R=45 , L=60   Extension: R=55, L=55   Ulnar Deviation: R=25  L=25   Radial Deviation R =10, L 20      AROM/P has agreed to actively participate in planning and for this course of care. Thank you for your referral. Please co-sign or sign and return this letter via fax as soon as possible to [phone].  If you have any questions, please contact me at Dept: [phone]

## 2018-10-03 ENCOUNTER — HOSPITAL ENCOUNTER (OUTPATIENT)
Dept: PHYSICAL THERAPY | Facility: HOSPITAL | Age: 73
Setting detail: THERAPIES SERIES
Discharge: HOME | End: 2018-10-03
Attending: Other
Payer: COMMERCIAL

## 2018-10-03 PROCEDURE — 97140 MANUAL THERAPY 1/> REGIONS: CPT

## 2018-10-03 PROCEDURE — 97110 THERAPEUTIC EXERCISES: CPT

## 2018-10-03 NOTE — PROGRESS NOTES
Dx: Cervical spondylosis with radiculopathy           Authorized # of Visits:  10 requested (8 approved per HMO)         Next MD visit: none scheduled  Fall Risk: standard         Precautions: n/a             Subjective: Patient reports that he has been ha TX#: 6/ Date:               TX#: 7/ Date:               TX#: 8/   Pulleys  - flexion x 2 min  - abd x 2 min - UBE L2 x 3min fwd, 2 min retro  - pulleys flexion x 2 min - UBE L2 x 3min fwd, 2 min retro       SL horiz abd with cervical rot x 10 R with rotation, LTR    Charges: Manual x 1, Therex x 2       Total Timed Treatment: 45 min  Total Treatment Time: 45 min

## 2018-10-08 ENCOUNTER — HOSPITAL ENCOUNTER (OUTPATIENT)
Dept: PHYSICAL THERAPY | Facility: HOSPITAL | Age: 73
Setting detail: THERAPIES SERIES
Discharge: HOME | End: 2018-10-08
Attending: Other
Payer: COMMERCIAL

## 2018-10-08 PROCEDURE — 97110 THERAPEUTIC EXERCISES: CPT

## 2018-10-08 PROCEDURE — 97140 MANUAL THERAPY 1/> REGIONS: CPT

## 2018-10-08 NOTE — PROGRESS NOTES
Dx: Cervical spondylosis with radiculopathy           Authorized # of Visits:  10 requested (8 approved per HMO)         Next MD visit: none scheduled  Fall Risk: standard         Precautions: n/a             Subjective: Patient reports that he has been ha issued and reviewed    Plan: Continue PT with progression as indicated and tolerated. Cervical and thoracic mobs.    Date: 9/26/2018 TX#: 2/8 Date: 10/1/18    TX#: 3/8   Date: 10/3/18   TX#: 4/8 Date: 10/8/18    TX#: 5/8 Date:               TX#: 6/ Date: cervical rot and SB PROM, stretching  - supine passive pec stretching R with STM origin  - shoulder PROM with inferior and posterior glides Manual x 15 min  - seated cervical distraction with AAROM rotation x 10 R/L  - SL scap mobs R  - shoulder PROM with

## 2018-10-10 ENCOUNTER — HOSPITAL ENCOUNTER (OUTPATIENT)
Dept: PHYSICAL THERAPY | Facility: HOSPITAL | Age: 73
Setting detail: THERAPIES SERIES
Discharge: HOME | End: 2018-10-10
Attending: Other
Payer: COMMERCIAL

## 2018-10-10 PROCEDURE — 97110 THERAPEUTIC EXERCISES: CPT

## 2018-10-10 PROCEDURE — 97140 MANUAL THERAPY 1/> REGIONS: CPT

## 2018-10-10 NOTE — PROGRESS NOTES
Dx: Cervical spondylosis with radiculopathy           Authorized # of Visits:  10 requested (8 approved per HMO)         Next MD visit: none scheduled  Fall Risk: standard         Precautions: n/a             Subjective: Patient reports that he feels he is due to dec pain. - progress  5. Patient will be independent and compliant in HEP to maintain progress made in PT.  - issued and reviewed    Plan: Continue PT with progression as indicated and tolerated. Determine if further intervention is needed.    Date: Deep squats with lat stretch at bar x 15     LTR during passive pec stretching RTB   - rows x 20  - NASH ext x 20 - GTB rows x 20  - GTB NASH ext x 20  - RTB IR x 20  - RTB ER 2 x 10 - BTB rows x 25  - RTB IR x 20  - RTB ER x 20 (improved form) - BTB rows x

## 2018-10-15 ENCOUNTER — HOSPITAL ENCOUNTER (OUTPATIENT)
Dept: PHYSICAL THERAPY | Facility: HOSPITAL | Age: 73
Setting detail: THERAPIES SERIES
Discharge: HOME | End: 2018-10-15
Attending: Other
Payer: COMMERCIAL

## 2018-10-15 PROCEDURE — 97140 MANUAL THERAPY 1/> REGIONS: CPT

## 2018-10-15 PROCEDURE — 97110 THERAPEUTIC EXERCISES: CPT

## 2018-10-15 NOTE — PROGRESS NOTES
Dx: Cervical spondylosis with radiculopathy           Authorized # of Visits:  10 requested (8 approved per HMO)         Next MD visit: none scheduled  Fall Risk: standard         Precautions: n/a             Subjective: Patient reports that he is having a 2. Patient will improve shoulder strength to at least 4/5 throughout to improve ability to use RUE for ADLs as well as gardening as holding his grandchildren.    3. Patient will improve R shoulder flexion AROM to at least 140 deg to improve ability to rose 10  - wall flexion slide with LT lift off x 10 Wall flexion with LT lift off, manual inferior glide to avoid hiking x 15 Fwd flexion with UEs on ballet bar, LT lift off x 10 (hard) Fwd flexion with UEs on ballet bar, LT lift off x 10 (hard)    Seated thora thoracic C-PA mobs T1-7, gr II/III  - SL scap mobs with posterior tilting  - inferior and posterior capsule mobs, MWM    Skilled Services: Therex within tolerance to improve cervical, thoracic, and RUE mobility and dec pain; progression as listed.  Manual

## 2018-10-17 ENCOUNTER — HOSPITAL ENCOUNTER (OUTPATIENT)
Dept: OCCUPATIONAL MEDICINE | Facility: HOSPITAL | Age: 73
Setting detail: THERAPIES SERIES
Discharge: HOME | End: 2018-10-17
Attending: Other
Payer: COMMERCIAL

## 2018-10-17 ENCOUNTER — HOSPITAL ENCOUNTER (OUTPATIENT)
Dept: PHYSICAL THERAPY | Facility: HOSPITAL | Age: 73
Setting detail: THERAPIES SERIES
Discharge: HOME | End: 2018-10-17
Attending: Other
Payer: COMMERCIAL

## 2018-10-17 PROCEDURE — 97140 MANUAL THERAPY 1/> REGIONS: CPT

## 2018-10-17 PROCEDURE — 97110 THERAPEUTIC EXERCISES: CPT

## 2018-10-17 NOTE — PROGRESS NOTES
Dx:R S/P CTR Bilateral CTS(G56.03) and R Elbow Ulnar release Bilateral Ulnar neuropathy (K28.82)         Authorized # of Visits: 2/12       Next MD visit: none scheduled  Fall Risk: standard         Precautions: n/a             Subjective:   Pt reporting:

## 2018-10-18 NOTE — PROGRESS NOTES
Progress Summary  Dx: Cervical spondylosis with radiculopathy           Authorized # of Visits:  10 requested (8 approved per HMO)         Next MD visit: none scheduled  Fall Risk: standard         Precautions: n/a      Pt has attended 8, cancelled 0, and findings.      Objective:     Cervical AROM:   Flexion:54 deg  Extension: 27 deg  Sidebending: R 15 deg; L 19 deg  Rotation:R 64 deg; L 59 deg    Shoulder AROM: (10/15/18)  Flexion: R 136 with pain; L 160  Abduction: R 121 with pain; L 155  ER: R 88; L 83 [phone]. Sincerely,  Electronically signed by therapist: Basil Zabala PT, DPT    [de-identified] certification required: Yes  I certify the need for these services furnished under this plan of treatment and while under my care.     X___________________ off x 10 Wall flexion with LT lift off, manual inferior glide to avoid hiking x 15 Fwd flexion with UEs on ballet bar, LT lift off x 10 (hard) Fwd flexion with UEs on ballet bar, LT lift off x 10 (hard) -   Seated thoracic ext over ball x 10 Seated thoraci prone thoracic C-PA mobs T1-7, gr II/III  - SL scap mobs with posterior tilting  - inferior and posterior capsule mobs, MWM Manual x 30 min  - supine cervical AAROM/PROM rotation with SO STM  - prone thoracic passive phys motion  - prone thoracic C-PA mobs

## 2018-10-19 DIAGNOSIS — G56.03 BILATERAL CARPAL TUNNEL SYNDROME: ICD-10-CM

## 2018-10-19 DIAGNOSIS — M47.22 CERVICAL SPONDYLOSIS WITH RADICULOPATHY: ICD-10-CM

## 2018-10-19 DIAGNOSIS — G56.23 ULNAR NEUROPATHY OF BOTH UPPER EXTREMITIES: Primary | ICD-10-CM

## 2018-10-23 ENCOUNTER — TELEPHONE (OUTPATIENT)
Dept: NEUROLOGY | Facility: CLINIC | Age: 73
End: 2018-10-23

## 2018-10-23 DIAGNOSIS — G56.23 ULNAR NEUROPATHY OF BOTH UPPER EXTREMITIES: Primary | ICD-10-CM

## 2018-10-23 DIAGNOSIS — G56.03 BILATERAL CARPAL TUNNEL SYNDROME: ICD-10-CM

## 2018-10-23 NOTE — TELEPHONE ENCOUNTER
Lizbeth Martinez from [facility] outpatient rehab calling because she would like 8 more visits added to the physical therapy order.

## 2018-10-24 ENCOUNTER — HOSPITAL ENCOUNTER (OUTPATIENT)
Dept: OCCUPATIONAL MEDICINE | Facility: HOSPITAL | Age: 73
Setting detail: THERAPIES SERIES
Discharge: HOME | End: 2018-10-24
Attending: Other
Payer: COMMERCIAL

## 2018-10-24 PROCEDURE — 97110 THERAPEUTIC EXERCISES: CPT

## 2018-10-24 PROCEDURE — 97140 MANUAL THERAPY 1/> REGIONS: CPT

## 2018-10-24 NOTE — PROGRESS NOTES
Dx:R S/P CTR Bilateral CTS(G56.03) and R Elbow Ulnar release Bilateral Ulnar neuropathy (U71.19)         Authorized # of Visits: 3/12       Next MD visit: none scheduled  Fall Risk: standard         Precautions: n/a           Subjective:   Pt reporting:  * puttycize lrg knob R        Radial nreve flossing Radial nerve flossing bilat.          IASTM to R palm CTR scar tissue          RUE elbow and wrist AROM                 Skilled Services: manual therapy    Charges: 2x TE, 1x MT      Total Timed Treatment: 4

## 2018-10-26 ENCOUNTER — HOSPITAL ENCOUNTER (OUTPATIENT)
Dept: PHYSICAL THERAPY | Facility: HOSPITAL | Age: 73
Setting detail: THERAPIES SERIES
Discharge: HOME | End: 2018-10-26
Attending: Other
Payer: COMMERCIAL

## 2018-10-26 DIAGNOSIS — G56.03 BILATERAL CARPAL TUNNEL SYNDROME: ICD-10-CM

## 2018-10-26 DIAGNOSIS — G56.23 ULNAR NEUROPATHY OF BOTH UPPER EXTREMITIES: ICD-10-CM

## 2018-10-26 DIAGNOSIS — M47.22 CERVICAL SPONDYLOSIS WITH RADICULOPATHY: ICD-10-CM

## 2018-10-26 PROCEDURE — 97140 MANUAL THERAPY 1/> REGIONS: CPT

## 2018-10-26 PROCEDURE — 97110 THERAPEUTIC EXERCISES: CPT

## 2018-10-26 NOTE — PROGRESS NOTES
Dx: Cervical spondylosis with radiculopathy           Authorized # of Visits:  16 requested (16 approved per HMO)         Next MD visit: none scheduled  Fall Risk: standard         Precautions: n/a        Subjective: Patient reports that his neck is feelin of his head. - progress  4. Patient will report improved ability to sleep at night due to dec pain. - progress  5.  Patient will be independent and compliant in HEP to maintain progress made in PT.  - issued and reviewed      Plan: Continue PT with monitori min   TRX Y stretch x 12 TRX Y stretch, 5sec hold x 10   TRX Y stretch, 5sec hold x 10  - wall flexion slide with LT lift off x 10 Wall flexion with LT lift off, manual inferior glide to avoid hiking x 15 Fwd flexion with UEs on ballet bar, LT lift off x 1 scap mobs R with IR  Manual x 20 min  - supine cervical AAROM/PROM rotation with SO STM  - prone thoracic passive phys motion  - prone thoracic C-PA mobs T1-7, gr II/III  - SL scap mobs with posterior tilting  - inferior and posterior capsule mobs, MWM Man

## 2018-10-31 ENCOUNTER — HOSPITAL ENCOUNTER (OUTPATIENT)
Dept: OCCUPATIONAL MEDICINE | Facility: HOSPITAL | Age: 73
Setting detail: THERAPIES SERIES
Discharge: HOME | End: 2018-10-31
Attending: Other
Payer: COMMERCIAL

## 2018-10-31 PROCEDURE — 97140 MANUAL THERAPY 1/> REGIONS: CPT

## 2018-10-31 PROCEDURE — 97110 THERAPEUTIC EXERCISES: CPT

## 2018-10-31 NOTE — PROGRESS NOTES
Dx:R S/P CTR Bilateral CTS(G56.03) and R Elbow Ulnar release Bilateral Ulnar neuropathy (R56.42)         Authorized # of Visits: 4/12       Next MD visit: none scheduled  Fall Risk: standard         Precautions: n/a           Subjective:   Pt reporting:  * velcro  checkers Tip pinch  3# weight uln/rad dev and wrist flx/ext  Red twist stik 3 directions       Manual massage to R thumb muscles Red twist stik 3 directions 3# weight uln/rad dev and wrist flx/ext        AROM R wrist, fingers and thumb  yel put

## 2018-11-01 VITALS
SYSTOLIC BLOOD PRESSURE: 166 MMHG | DIASTOLIC BLOOD PRESSURE: 76 MMHG | OXYGEN SATURATION: 97 % | WEIGHT: 209 LBS | HEIGHT: 64 IN | BODY MASS INDEX: 35.68 KG/M2 | HEART RATE: 75 BPM

## 2018-11-01 VITALS
DIASTOLIC BLOOD PRESSURE: 83 MMHG | HEART RATE: 78 BPM | OXYGEN SATURATION: 97 % | HEIGHT: 64 IN | WEIGHT: 209.99 LBS | BODY MASS INDEX: 35.85 KG/M2 | SYSTOLIC BLOOD PRESSURE: 154 MMHG

## 2018-11-02 ENCOUNTER — TELEPHONE (OUTPATIENT)
Dept: SURGERY | Facility: CLINIC | Age: 73
End: 2018-11-02

## 2018-11-02 NOTE — TELEPHONE ENCOUNTER
Referral for additional PT needs to be in pending status, not open. [facility] cannot see it unless it is pending.

## 2018-11-07 ENCOUNTER — HOSPITAL ENCOUNTER (OUTPATIENT)
Dept: OCCUPATIONAL MEDICINE | Facility: HOSPITAL | Age: 73
Setting detail: THERAPIES SERIES
Discharge: HOME | End: 2018-11-07
Attending: Other
Payer: COMMERCIAL

## 2018-11-07 PROCEDURE — 97140 MANUAL THERAPY 1/> REGIONS: CPT

## 2018-11-07 PROCEDURE — 97110 THERAPEUTIC EXERCISES: CPT

## 2018-11-07 NOTE — PROGRESS NOTES
Dx:R S/P CTR Bilateral CTS(G56.03) and R Elbow Ulnar release Bilateral Ulnar neuropathy (I44.28)         Authorized # of Visits: 5/12       Next MD visit: none scheduled  Fall Risk: standard         Precautions: n/a           Subjective:   Pt reporting:  * pinch yel putty  and pinch yel putty  and pinch      velcro  checkers Tip pinch  3# weight uln/rad dev and wrist flx/ext  Red twist stik 3 directions Red twist stik 3 directions      Manual massage to R thumb muscles Red twist stik 3 directions 3#

## 2018-11-09 ENCOUNTER — HOSPITAL ENCOUNTER (OUTPATIENT)
Dept: PHYSICAL THERAPY | Facility: HOSPITAL | Age: 73
Setting detail: THERAPIES SERIES
Discharge: HOME | End: 2018-11-09
Attending: Other
Payer: COMMERCIAL

## 2018-11-09 DIAGNOSIS — G56.03 BILATERAL CARPAL TUNNEL SYNDROME: ICD-10-CM

## 2018-11-09 DIAGNOSIS — G56.23 ULNAR NEUROPATHY OF BOTH UPPER EXTREMITIES: ICD-10-CM

## 2018-11-09 PROCEDURE — 97110 THERAPEUTIC EXERCISES: CPT

## 2018-11-09 PROCEDURE — 97140 MANUAL THERAPY 1/> REGIONS: CPT

## 2018-11-09 NOTE — PROGRESS NOTES
Dx: Cervical spondylosis with radiculopathy           Authorized # of Visits:  16 requested (16 approved per HMO)         Next MD visit: none scheduled  Fall Risk: standard         Precautions: n/a        Subjective: Patient reports that he is not exactly into [facility] cabinets and to the top of his head. - progress  4. Patient will report improved ability to sleep at night due to dec pain. - progress  5.  Patient will be independent and compliant in HEP to maintain progress made in PT.  - issued and reviewed Manual hands behind head stretching/oscillation x 1 min -   TRX Y stretch, 5sec hold x 10   TRX Y stretch, 5sec hold x 10  - wall flexion slide with LT lift off x 10 Wall flexion with LT lift off, manual inferior glide to avoid hiking x 15 Fwd flexion with prone scap mobs R with IR  Manual x 20 min  - supine cervical AAROM/PROM rotation with SO STM  - prone thoracic passive phys motion  - prone thoracic C-PA mobs T1-7, gr II/III  - SL scap mobs with posterior tilting  - inferior and posterior capsule mobs, M

## 2018-11-12 ENCOUNTER — HOSPITAL ENCOUNTER (OUTPATIENT)
Dept: PHYSICAL THERAPY | Facility: HOSPITAL | Age: 73
Setting detail: THERAPIES SERIES
Discharge: HOME | End: 2018-11-12
Attending: Other
Payer: COMMERCIAL

## 2018-11-12 PROCEDURE — 97140 MANUAL THERAPY 1/> REGIONS: CPT

## 2018-11-12 PROCEDURE — 97110 THERAPEUTIC EXERCISES: CPT

## 2018-11-13 NOTE — PROGRESS NOTES
Dx: Cervical spondylosis with radiculopathy           Authorized # of Visits:  16 requested (16 approved per HMO)         Next MD visit: none scheduled  Fall Risk: standard         Precautions: n/a        Subjective: Patient reports he felt great after Fri Date: 10/26/18  TX#: 9/16 Date: 11/9/18  TX#: 10/16 Date: 11/12/18  TX#: 11/16   - UBE L2 x 3min fwd, 2 min retro UBE L2 x 3min fwd, 2 min retro UBE L4 x 3min fwd, 2 min retro UBE L4 x 3min fwd, 2 min retro UBE L4 x 3min fwd, 2 min retro UBE L3 x 3min fwd, thoracic ext over pivot point x 20 Side step over box x 15 Seated thoracic extension over foam roll x 10  Seated thoracic extension over foam roll x 10  Seated thoracic extension over foam roll x 10  Seated thoracic extension over foam roll x 8 ea 2 levles CT junction in flexion, gr II/III  - SL scap mobs with posterior tilting  - inferior and posterior capsule mobs, MWM Manual x 15 min  - supine cervical AAROM/PROM rotation with SO STM  - cervical side glides gr II/III  - PROM> AAROM cervical rotation   - R

## 2018-11-14 ENCOUNTER — HOSPITAL ENCOUNTER (OUTPATIENT)
Dept: OCCUPATIONAL MEDICINE | Facility: HOSPITAL | Age: 73
Setting detail: THERAPIES SERIES
Discharge: HOME | End: 2018-11-14
Attending: SPECIALIST
Payer: COMMERCIAL

## 2018-11-14 PROCEDURE — 97110 THERAPEUTIC EXERCISES: CPT

## 2018-11-15 ENCOUNTER — TELEPHONE (OUTPATIENT)
Dept: SURGERY | Facility: CLINIC | Age: 73
End: 2018-11-15

## 2018-11-15 NOTE — TELEPHONE ENCOUNTER
Spoke with OT-Marlyn who reported below symptoms. OT thinks this may be due to his neck. OT will be seeing him next for his last session on 11/21/18.      Informed OT that patient was last seen on 8/3/18 and was supposed to f/u 6 weeks from that last dominique

## 2018-11-15 NOTE — TELEPHONE ENCOUNTER
OT noticing immediate muscle wasting in right thumb web space, happened \"overnight\", significant weakness simulatneous neck pain; OT recommending stopping treatment to treat neck pain, asking for permission to stop treatment

## 2018-11-15 NOTE — PROGRESS NOTES
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

## 2018-11-16 ENCOUNTER — LAB ENCOUNTER (OUTPATIENT)
Dept: LAB | Age: 73
End: 2018-11-16
Attending: SPECIALIST
Payer: COMMERCIAL

## 2018-11-16 DIAGNOSIS — E11.65 TYPE II DIABETES MELLITUS, UNCONTROLLED (HCC): ICD-10-CM

## 2018-11-16 DIAGNOSIS — Z00.01 ENCOUNTER FOR GENERAL ADULT MEDICAL EXAMINATION WITH ABNORMAL FINDINGS: ICD-10-CM

## 2018-11-16 DIAGNOSIS — E03.9 HYPOTHYROIDISM: Primary | ICD-10-CM

## 2018-11-16 DIAGNOSIS — E78.2 MIXED HYPERLIPIDEMIA: ICD-10-CM

## 2018-11-16 PROCEDURE — 80053 COMPREHEN METABOLIC PANEL: CPT

## 2018-11-16 PROCEDURE — 84439 ASSAY OF FREE THYROXINE: CPT

## 2018-11-16 PROCEDURE — 80061 LIPID PANEL: CPT

## 2018-11-16 PROCEDURE — 83036 HEMOGLOBIN GLYCOSYLATED A1C: CPT

## 2018-11-16 PROCEDURE — 82570 ASSAY OF URINE CREATININE: CPT

## 2018-11-16 PROCEDURE — 85025 COMPLETE CBC W/AUTO DIFF WBC: CPT

## 2018-11-16 PROCEDURE — 82043 UR ALBUMIN QUANTITATIVE: CPT

## 2018-11-16 PROCEDURE — 36415 COLL VENOUS BLD VENIPUNCTURE: CPT

## 2018-11-16 PROCEDURE — 84443 ASSAY THYROID STIM HORMONE: CPT

## 2018-11-16 NOTE — TELEPHONE ENCOUNTER
I spoke with the patient's son the other day and recommended he come in for a follow-up appt. Please make sure he schedules an appt.

## 2018-11-19 ENCOUNTER — HOSPITAL ENCOUNTER (OUTPATIENT)
Dept: PHYSICAL THERAPY | Facility: HOSPITAL | Age: 73
Setting detail: THERAPIES SERIES
Discharge: HOME | End: 2018-11-19
Attending: Other
Payer: COMMERCIAL

## 2018-11-19 PROCEDURE — 97140 MANUAL THERAPY 1/> REGIONS: CPT

## 2018-11-19 PROCEDURE — 97110 THERAPEUTIC EXERCISES: CPT

## 2018-11-20 NOTE — PROGRESS NOTES
Dx: Cervical spondylosis with radiculopathy           Authorized # of Visits:  16 requested (16 approved per HMO)         Next MD visit: none scheduled  Fall Risk: standard         Precautions: n/a        Subjective: Patient reports that he has been doing independent and compliant in HEP to maintain progress made in PT.  - issued and reviewed      Plan:  Will continue with 1 additional visit this week, ensure patient has appropriate HEP, and place on hold until he follows-up with MD for any additional imagin glide to avoid hiking x 15 Fwd flexion with UEs on ballet bar, LT lift off x 10 (hard) Fwd flexion with UEs on ballet bar, LT lift off x 10 (hard) - - TRX flexion/Y stretch x 15  - RTB swimmers 2 x 8 - TRX flexion/Y stretch x 15  - RTB swimmers 2 x 8 Fwd f motion  - prone thoracic C-PA mobs T1-7, gr II/III  - SL scap mobs with posterior tilting  - inferior and posterior capsule mobs, MWM Manual x 30 min  - supine cervical AAROM/PROM rotation with SO STM  - prone thoracic passive phys motion  - prone thoracic

## 2018-11-21 ENCOUNTER — HOSPITAL ENCOUNTER (OUTPATIENT)
Dept: PHYSICAL THERAPY | Facility: HOSPITAL | Age: 73
Setting detail: THERAPIES SERIES
Discharge: HOME | End: 2018-11-21
Attending: Other
Payer: COMMERCIAL

## 2018-11-21 ENCOUNTER — HOSPITAL ENCOUNTER (OUTPATIENT)
Dept: OCCUPATIONAL MEDICINE | Facility: HOSPITAL | Age: 73
Setting detail: THERAPIES SERIES
Discharge: HOME | End: 2018-11-21
Attending: SPECIALIST
Payer: COMMERCIAL

## 2018-11-21 PROCEDURE — 97140 MANUAL THERAPY 1/> REGIONS: CPT

## 2018-11-21 PROCEDURE — 97110 THERAPEUTIC EXERCISES: CPT

## 2018-11-21 NOTE — PROGRESS NOTES
Dx:R S/P CTR Bilateral CTS(G56.03) and R Elbow Ulnar release Bilateral Ulnar neuropathy (G56.23)         Authorized # of Visits: 7/12       Next MD visit: none scheduled  Fall Risk: standard         Precautions: n/a       Discharge Summary    Pt has attend Pronation: R=WNL , L=WNL  Flexion: R=45 , L=60   Extension: R=56, (+1) L=60 (+5)   Ulnar Deviation: R=25  L=25   Radial Deviation R =14 (+4), L 20       AROM/PROM:(Degrees)  RIGHT HAND:    Thumb IF MF RF SF   MP 0-58 0-80 0-83 0-83 0-84   PIP 0-67 0-80 0 and pinch velcro checkers R tip pinch   Hot pk R hand  PROM R carpals and finger jnts R HAND  HOT PK R HAND  HOT PK  yel putty  and pinch    yel puttycize lrg knob smal knob velcro checkers R tip pinch yel putty  and pinch yel putty  and pinch

## 2018-11-22 NOTE — PROGRESS NOTES
Dx: Cervical spondylosis with radiculopathy           Authorized # of Visits:  16 requested (16 approved per HMO)         Next MD visit: none scheduled  Fall Risk: standard         Precautions: n/a        Subjective: Patient reports that his neck and shoul HEP supine cervical retraction with lift and hold to improve deep neck flexor endurance. Continues to demonstrate weakness, but making gradual improvements. See plan as listed below. Goals:(To be met in 16 visits)  1.  Patient will improve cervical ro 2 x 5 (hard) - supine cervical retraction x 10  - supine cervical retraction with lift, no hold 2 x 5 (hard) - supine cervical retraction x 10  - supine cervical retraction with lift, no hold 2 x 5 (hard) - supine cervical retraction with lift, no hold 2 x BTB rows x 30  - RTB ER x 20 (cueing scap setting) - - Standing 1# NASH flexion into horizontal rot with cervical rotation following x 10 (hard) Standing against 1/2 foam roll  - cervical retraction x 10  - cervical retraction with scap retract x 10 Standin flex, abd, ER, IR  - R shoulder inferior and posterior glides   - seated posterior glides Manual x 15 min  - R shoulder STM with oscillation  - R shoulder PROM flex, abd, ER, IR  - R shoulder inferior and posterior glides   - seated posterior glides   Skil

## 2018-11-26 ENCOUNTER — APPOINTMENT (OUTPATIENT)
Dept: PHYSICAL THERAPY | Facility: HOSPITAL | Age: 73
End: 2018-11-26
Attending: Other
Payer: COMMERCIAL

## 2018-11-28 ENCOUNTER — APPOINTMENT (OUTPATIENT)
Dept: OCCUPATIONAL MEDICINE | Facility: HOSPITAL | Age: 73
End: 2018-11-28
Attending: SPECIALIST
Payer: COMMERCIAL

## 2018-11-30 ENCOUNTER — APPOINTMENT (OUTPATIENT)
Dept: OCCUPATIONAL MEDICINE | Facility: HOSPITAL | Age: 73
End: 2018-11-30
Attending: PHYSICIAN ASSISTANT
Payer: COMMERCIAL

## 2018-12-03 ENCOUNTER — TELEPHONE (OUTPATIENT)
Dept: SURGERY | Facility: CLINIC | Age: 73
End: 2018-12-03

## 2018-12-03 ENCOUNTER — OFFICE VISIT (OUTPATIENT)
Dept: SURGERY | Facility: CLINIC | Age: 73
End: 2018-12-03

## 2018-12-03 VITALS — DIASTOLIC BLOOD PRESSURE: 74 MMHG | SYSTOLIC BLOOD PRESSURE: 134 MMHG | HEART RATE: 80 BPM

## 2018-12-03 DIAGNOSIS — G56.03 BILATERAL CARPAL TUNNEL SYNDROME: ICD-10-CM

## 2018-12-03 DIAGNOSIS — G56.23 ULNAR NEUROPATHY OF BOTH UPPER EXTREMITIES: Primary | ICD-10-CM

## 2018-12-03 DIAGNOSIS — M50.10 HERNIATION OF CERVICAL INTERVERTEBRAL DISC WITH RADICULOPATHY: ICD-10-CM

## 2018-12-03 PROCEDURE — 99213 OFFICE O/P EST LOW 20 MIN: CPT | Performed by: PHYSICIAN ASSISTANT

## 2018-12-03 RX ORDER — OFLOXACIN 3 MG/ML
1 SOLUTION/ DROPS OPHTHALMIC 3 TIMES DAILY
Refills: 1 | COMMUNITY
Start: 2018-11-14 | End: 2019-05-14

## 2018-12-03 RX ORDER — BROMFENAC 0.76 MG/ML
1 SOLUTION/ DROPS OPHTHALMIC DAILY
Refills: 1 | COMMUNITY
Start: 2018-11-20 | End: 2019-05-14

## 2018-12-03 NOTE — PATIENT INSTRUCTIONS
Refill policies:    • Allow 2-3 business days for refills; controlled substances may take longer.   • Contact your pharmacy at least 5 days prior to running out of medication and have them send an electronic request or submit request through the “request re entire amount billed. Precertification and Prior Authorizations: If your physician has recommended that you have a procedure or additional testing performed.   [facility]) will contact your insurance carrier to obtain pre-certi

## 2018-12-03 NOTE — PROGRESS NOTES
Neurosurgery Clinic Visit  12/3/2018    Tariq Tan Maggie PCP:  Lexis Del Toro MD    1945 MRN [id number]     HISTORY OF PRESENT ILLNESS:  Darcy Tan Maggie is a(n) 68year old male who is here 5.5 months s/p right carpal tunnel release and ulnar procedure of the ulnar nerve. Will obtain EMG of his RUE. Will also repeat MRI cervical spine. He will RTC once these studies are completed. Pt appreciative.     Total time spent: 15 Minutes  Greater than 50% of the time was spent on counseling/coordina

## 2018-12-03 NOTE — TELEPHONE ENCOUNTER
Per DHARA Viveros Pt will schedule MRI and EMG, then will call our office to schedule a f/u with Dr Malissa Almonte.  Call RNs to get Pt in a few days after MRI and EMG

## 2018-12-03 NOTE — PROGRESS NOTES
Pt is here for carpel tunnel. Pt states that he has been going to OT and PT for the carpel tunnel. . Pt states that he see no change with the right hand. Pt states that he has gotten worse with OT and PT. Pt states that he has weakness in the right hand.

## 2018-12-05 ENCOUNTER — APPOINTMENT (OUTPATIENT)
Dept: OCCUPATIONAL MEDICINE | Facility: HOSPITAL | Age: 73
End: 2018-12-05
Attending: SPECIALIST
Payer: COMMERCIAL

## 2018-12-07 ENCOUNTER — APPOINTMENT (OUTPATIENT)
Dept: OCCUPATIONAL MEDICINE | Facility: HOSPITAL | Age: 73
End: 2018-12-07
Attending: PHYSICIAN ASSISTANT
Payer: COMMERCIAL

## 2018-12-07 ENCOUNTER — HOSPITAL ENCOUNTER (OUTPATIENT)
Dept: MRI IMAGING | Facility: HOSPITAL | Age: 73
Discharge: HOME | End: 2018-12-07
Attending: PHYSICIAN ASSISTANT
Payer: COMMERCIAL

## 2018-12-07 DIAGNOSIS — G56.23 ULNAR NEUROPATHY OF BOTH UPPER EXTREMITIES: ICD-10-CM

## 2018-12-07 DIAGNOSIS — M50.10 HERNIATION OF CERVICAL INTERVERTEBRAL DISC WITH RADICULOPATHY: ICD-10-CM

## 2018-12-07 DIAGNOSIS — G56.03 BILATERAL CARPAL TUNNEL SYNDROME: ICD-10-CM

## 2018-12-07 PROCEDURE — 72141 MRI NECK SPINE W/O DYE: CPT | Performed by: PHYSICIAN ASSISTANT

## 2018-12-12 ENCOUNTER — APPOINTMENT (OUTPATIENT)
Dept: OCCUPATIONAL MEDICINE | Facility: HOSPITAL | Age: 73
End: 2018-12-12
Attending: SPECIALIST
Payer: COMMERCIAL

## 2018-12-12 ENCOUNTER — HOSPITAL ENCOUNTER (OUTPATIENT)
Dept: ULTRASOUND IMAGING | Facility: HOSPITAL | Age: 73
Discharge: HOME | End: 2018-12-12
Attending: SPECIALIST
Payer: COMMERCIAL

## 2018-12-12 ENCOUNTER — HOSPITAL ENCOUNTER (OUTPATIENT)
Dept: MRI IMAGING | Facility: HOSPITAL | Age: 73
Discharge: HOME | End: 2018-12-12
Attending: SPECIALIST
Payer: COMMERCIAL

## 2018-12-12 DIAGNOSIS — R41.3 MEMORY LOSS: ICD-10-CM

## 2018-12-12 DIAGNOSIS — E11.59 DIABETES MELLITUS WITH CIRCULATORY COMPLICATION (HCC): ICD-10-CM

## 2018-12-12 DIAGNOSIS — I69.811 MEMORY DEFICIT FOLLOWING OTHER CEREBROVASCULAR DISEASE: ICD-10-CM

## 2018-12-12 PROCEDURE — A9575 INJ GADOTERATE MEGLUMI 0.1ML: HCPCS | Performed by: SPECIALIST

## 2018-12-12 PROCEDURE — 93880 EXTRACRANIAL BILAT STUDY: CPT | Performed by: SPECIALIST

## 2018-12-12 PROCEDURE — 70553 MRI BRAIN STEM W/O & W/DYE: CPT | Performed by: SPECIALIST

## 2018-12-14 ENCOUNTER — APPOINTMENT (OUTPATIENT)
Dept: OCCUPATIONAL MEDICINE | Facility: HOSPITAL | Age: 73
End: 2018-12-14
Attending: PHYSICIAN ASSISTANT
Payer: COMMERCIAL

## 2018-12-19 ENCOUNTER — HOSPITAL ENCOUNTER (OUTPATIENT)
Dept: PHYSICAL THERAPY | Facility: HOSPITAL | Age: 73
Setting detail: THERAPIES SERIES
Discharge: HOME | End: 2018-12-19
Attending: Other
Payer: COMMERCIAL

## 2018-12-19 ENCOUNTER — APPOINTMENT (OUTPATIENT)
Dept: OCCUPATIONAL MEDICINE | Facility: HOSPITAL | Age: 73
End: 2018-12-19
Attending: SPECIALIST
Payer: COMMERCIAL

## 2018-12-19 PROCEDURE — 97110 THERAPEUTIC EXERCISES: CPT

## 2018-12-19 PROCEDURE — 97140 MANUAL THERAPY 1/> REGIONS: CPT

## 2018-12-20 NOTE — PROGRESS NOTES
Progress/Discharge Summary  Dx: Cervical spondylosis with radiculopathy           Authorized # of Visits:  16 requested (16 approved per HMO)         Next MD visit: none scheduled  Fall Risk: standard         Precautions: n/a     Pt has attended Emely, kash HEP. Plan to hold at this time per plan as listed below.      Objective:   Deep neck flexor endurance: 3 sec (11/9/18)- progression 8 sec (11/21/18) - progression 10 sec (12/19/18)    Cervical AROM:   Flexion: 55 deg  Extension: 32 deg  Sidebending: R 18 de Date: 10/15/18   TX#: 7/8 Date: 10/17/18  TX#: 8/8 Date: 10/26/18  TX#: 9/16 Date: 11/9/18  TX#: 10/16 Date: 11/12/18  TX#: 11/16 Date: 11/19/18  TX#: 12/16 Date: 11/21/18  TX#: 13/16 Date: 12/20/18  TX#: 14/16   UBE L4 x 3min fwd, 2 min retro UBE L4 UEs on ballet bar, LT lift off x 10 (hard) Squats with LT stretch on ballet bar x 10 -   Seated thoracic extension over foam roll x 10  Seated thoracic extension over foam roll x 10  Seated thoracic extension over foam roll x 8 ea 2 levles Seated thoracic cervical rotation   - R shoulder PROM with inferior and posterior glides as needed Manual x 15 min  - supine cervical AAROM/PROM rotation with SO STM  - cervical side glides gr II/III  - PROM> AAROM cervical rotation   - R shoulder PROM with inferior and p

## 2018-12-21 ENCOUNTER — APPOINTMENT (OUTPATIENT)
Dept: OCCUPATIONAL MEDICINE | Facility: HOSPITAL | Age: 73
End: 2018-12-21
Attending: PHYSICIAN ASSISTANT
Payer: COMMERCIAL

## 2018-12-28 ENCOUNTER — APPOINTMENT (OUTPATIENT)
Dept: OCCUPATIONAL MEDICINE | Facility: HOSPITAL | Age: 73
End: 2018-12-28
Attending: PHYSICIAN ASSISTANT
Payer: COMMERCIAL

## 2019-01-02 ENCOUNTER — OFFICE VISIT (OUTPATIENT)
Dept: ELECTROPHYSIOLOGY | Facility: HOSPITAL | Age: 74
End: 2019-01-02
Attending: PHYSICIAN ASSISTANT
Payer: COMMERCIAL

## 2019-01-02 ENCOUNTER — APPOINTMENT (OUTPATIENT)
Dept: OCCUPATIONAL MEDICINE | Facility: HOSPITAL | Age: 74
End: 2019-01-02
Attending: PHYSICIAN ASSISTANT
Payer: COMMERCIAL

## 2019-01-02 DIAGNOSIS — M50.10 HERNIATION OF CERVICAL INTERVERTEBRAL DISC WITH RADICULOPATHY: ICD-10-CM

## 2019-01-02 DIAGNOSIS — G56.23 ULNAR NEUROPATHY OF BOTH UPPER EXTREMITIES: ICD-10-CM

## 2019-01-02 DIAGNOSIS — G62.9 NEUROPATHY: Primary | ICD-10-CM

## 2019-01-02 DIAGNOSIS — G56.03 BILATERAL CARPAL TUNNEL SYNDROME: ICD-10-CM

## 2019-01-02 PROCEDURE — 95886 MUSC TEST DONE W/N TEST COMP: CPT | Performed by: OTHER

## 2019-01-02 PROCEDURE — 95913 NRV CNDJ TEST 13/> STUDIES: CPT | Performed by: OTHER

## 2019-01-02 NOTE — PROCEDURES
[facility], [address] [place] [address]      PATIENT'S NAME: Paul CUEVAS   REFERRING PHYSICIAN:    PATIENT ACCOUNT #: [de-identified] LOCATION: [facility]   MEDICAL RECORD #: [id number] YOB: 1945   DATE OF delayed, more severe on the right than the left, with reduced amplitudes, more severe on the right. Conduction velocities were also slowed, on the left side more severe across the elbow and below the elbow. Compared to previous L.  Arm NCV in June 2018, am

## 2019-01-04 ENCOUNTER — APPOINTMENT (OUTPATIENT)
Dept: OCCUPATIONAL MEDICINE | Facility: HOSPITAL | Age: 74
End: 2019-01-04
Attending: PHYSICIAN ASSISTANT
Payer: COMMERCIAL

## 2019-01-15 ENCOUNTER — TELEPHONE (OUTPATIENT)
Dept: NEUROLOGY | Facility: CLINIC | Age: 74
End: 2019-01-15

## 2019-01-17 ENCOUNTER — OFFICE VISIT (OUTPATIENT)
Dept: SURGERY | Facility: CLINIC | Age: 74
End: 2019-01-17

## 2019-01-17 VITALS — HEART RATE: 88 BPM | DIASTOLIC BLOOD PRESSURE: 60 MMHG | SYSTOLIC BLOOD PRESSURE: 115 MMHG

## 2019-01-17 DIAGNOSIS — G56.03 CARPAL TUNNEL SYNDROME, BILATERAL UPPER LIMBS: ICD-10-CM

## 2019-01-17 DIAGNOSIS — G56.21 ULNAR NEUROPATHY AT ELBOW, RIGHT: Primary | ICD-10-CM

## 2019-01-17 PROCEDURE — 99213 OFFICE O/P EST LOW 20 MIN: CPT | Performed by: PHYSICIAN ASSISTANT

## 2019-01-17 RX ORDER — CYCLOBENZAPRINE HCL 10 MG
10 TABLET ORAL 3 TIMES DAILY PRN
Qty: 60 TABLET | Refills: 0 | Status: SHIPPED | OUTPATIENT
Start: 2019-01-17 | End: 2019-05-14

## 2019-01-17 NOTE — PROGRESS NOTES
Neurosurgery Clinic Visit  2019    Tariqsin Lujan PCP:  Saji Alexander MD    1945 MRN [id number]     HISTORY OF PRESENT ILLNESS:  Ayo Lujan is a(n) 68year old male who is here 7 months s/p right carpal tunnel release and ulnar n given for flexeril. Order given for elbow pad. Will discuss with neurology regarding EMG results. Will call patient with recommendations. RTC in 3 months. Pt appreciative. Dr. Samm Oviedo evaluated the patient and is in agreement with the plan.

## 2019-01-17 NOTE — PROGRESS NOTES
Pt said Emg test confirmed neuropathy in hands and feet. Pt states he has severe weakness and has had issues tripping. Pt has to modify his  to hold utensils. Pt said PT helped. Ran out of sessions in November 2018.          Pain 0/10  Best 0/10

## 2019-02-28 ENCOUNTER — LAB ENCOUNTER (OUTPATIENT)
Dept: LAB | Age: 74
End: 2019-02-28
Attending: SPECIALIST
Payer: COMMERCIAL

## 2019-02-28 DIAGNOSIS — I10 ESSENTIAL HYPERTENSION, MALIGNANT: ICD-10-CM

## 2019-02-28 DIAGNOSIS — E78.2 MIXED HYPERLIPIDEMIA: ICD-10-CM

## 2019-02-28 DIAGNOSIS — E11.9 DM (DIABETES MELLITUS) (HCC): Primary | ICD-10-CM

## 2019-02-28 LAB
ALBUMIN SERPL-MCNC: 3.5 G/DL (ref 3.4–5)
ALBUMIN/GLOB SERPL: 0.8 {RATIO} (ref 1–2)
ALP LIVER SERPL-CCNC: 73 U/L (ref 45–117)
ALT SERPL-CCNC: 28 U/L (ref 16–61)
ANION GAP SERPL CALC-SCNC: 8 MMOL/L (ref 0–18)
AST SERPL-CCNC: 29 U/L (ref 15–37)
BILIRUB SERPL-MCNC: 0.7 MG/DL (ref 0.1–2)
BUN BLD-MCNC: 16 MG/DL (ref 7–18)
BUN/CREAT SERPL: 18.6 (ref 10–20)
CALCIUM BLD-MCNC: 9.1 MG/DL (ref 8.5–10.1)
CHLORIDE SERPL-SCNC: 105 MMOL/L (ref 98–107)
CHOLEST SMN-MCNC: 134 MG/DL (ref ?–200)
CO2 SERPL-SCNC: 25 MMOL/L (ref 21–32)
CREAT BLD-MCNC: 0.86 MG/DL (ref 0.7–1.3)
EST. AVERAGE GLUCOSE BLD GHB EST-MCNC: 154 MG/DL (ref 68–126)
GLOBULIN PLAS-MCNC: 4.4 G/DL (ref 2.8–4.4)
GLUCOSE BLD-MCNC: 79 MG/DL (ref 70–99)
HBA1C MFR BLD HPLC: 7 % (ref ?–5.7)
HDLC SERPL-MCNC: 43 MG/DL (ref 40–59)
LDLC SERPL CALC-MCNC: 56 MG/DL (ref ?–100)
M PROTEIN MFR SERPL ELPH: 7.9 G/DL (ref 6.4–8.2)
NONHDLC SERPL-MCNC: 91 MG/DL (ref ?–130)
OSMOLALITY SERPL CALC.SUM OF ELEC: 286 MOSM/KG (ref 275–295)
POTASSIUM SERPL-SCNC: 4.3 MMOL/L (ref 3.5–5.1)
SODIUM SERPL-SCNC: 138 MMOL/L (ref 136–145)
TRIGL SERPL-MCNC: 175 MG/DL (ref 30–149)
VLDLC SERPL CALC-MCNC: 35 MG/DL (ref 0–30)

## 2019-02-28 PROCEDURE — 80061 LIPID PANEL: CPT

## 2019-02-28 PROCEDURE — 80053 COMPREHEN METABOLIC PANEL: CPT

## 2019-02-28 PROCEDURE — 83036 HEMOGLOBIN GLYCOSYLATED A1C: CPT

## 2019-02-28 PROCEDURE — 36415 COLL VENOUS BLD VENIPUNCTURE: CPT

## 2019-05-14 ENCOUNTER — OFFICE VISIT (OUTPATIENT)
Dept: SURGERY | Facility: CLINIC | Age: 74
End: 2019-05-14

## 2019-05-14 ENCOUNTER — TELEPHONE (OUTPATIENT)
Dept: PAIN CLINIC | Facility: CLINIC | Age: 74
End: 2019-05-14

## 2019-05-14 VITALS — SYSTOLIC BLOOD PRESSURE: 126 MMHG | DIASTOLIC BLOOD PRESSURE: 68 MMHG | HEART RATE: 78 BPM

## 2019-05-14 DIAGNOSIS — M47.22 CERVICAL SPONDYLOSIS WITH RADICULOPATHY: ICD-10-CM

## 2019-05-14 DIAGNOSIS — Z01.818 PRE-OP TESTING: ICD-10-CM

## 2019-05-14 DIAGNOSIS — G56.03 CARPAL TUNNEL SYNDROME, BILATERAL UPPER LIMBS: ICD-10-CM

## 2019-05-14 DIAGNOSIS — G56.21 ULNAR NEUROPATHY AT ELBOW, RIGHT: Primary | ICD-10-CM

## 2019-05-14 DIAGNOSIS — G56.22 ULNAR NEUROPATHY OF LEFT UPPER EXTREMITY: ICD-10-CM

## 2019-05-14 PROCEDURE — 99213 OFFICE O/P EST LOW 20 MIN: CPT | Performed by: PHYSICIAN ASSISTANT

## 2019-05-14 NOTE — PROGRESS NOTES
Neurosurgery Clinic Visit  2019    Tariq Pirce PCP:  Jhonny Sims MD    1945 MRN [id number]     HISTORY OF PRESENT ILLNESS:  Avery CUEVAS Yolette Price is a(n) 68year old male who is here 11 months s/p right carpal tunnel release and ulnar will consider left ulnar nerve decompression. We also discussed C5-6 and C6-7 ACDF. We will wait to see how he recovers before we schedule any additional surgeries. Pt appreciative.     Dr. Jerome Hall evaluated the patient and is in agreement with the plan

## 2019-05-14 NOTE — PROGRESS NOTES
Pt is here for follow up. Pt states that he has been taking medication flexeril PRN. Pt states that he has issues with stiffness in the neck.

## 2019-05-17 NOTE — TELEPHONE ENCOUNTER
You are scheduled for RIGHT ULNAR NERVE TRANSPOSITION AND ALL INDICATED PROCEDURES on *** with     Pre-op instructions discussed with patient and surgical packet provided:    · You will need preoperative labs, the orders have been placed.   · No

## 2019-05-17 NOTE — TELEPHONE ENCOUNTER
You are scheduled for a right  ulnar nerve decompression on  June 12  with .     Pre-op instructions discussed with patient:      · You will need preoperative labs. Orders have been placed.    · No blood thinning medications, over the counter non

## 2019-05-23 NOTE — TELEPHONE ENCOUNTER
Referral #[id number] initiated for Ulnar Transposition ([id number]) as outpatient procedure, clinical notes attached to referral, pending review.

## 2019-05-28 ENCOUNTER — HOSPITAL (OUTPATIENT)
Dept: OTHER | Age: 74
End: 2019-05-28
Attending: SPECIALIST

## 2019-06-03 NOTE — TELEPHONE ENCOUNTER
Patient has not scheduled PCP clearance appt yet. He is grateful for reminder. Will call today to schedule.

## 2019-06-06 RX ORDER — MULTIVITAMIN
1 TABLET ORAL DAILY
Status: ON HOLD | COMMUNITY
End: 2019-06-12

## 2019-06-06 RX ORDER — CHLORAL HYDRATE 500 MG
1000 CAPSULE ORAL DAILY
Status: ON HOLD | COMMUNITY
End: 2019-06-12

## 2019-06-08 ENCOUNTER — LABORATORY ENCOUNTER (OUTPATIENT)
Dept: LAB | Facility: HOSPITAL | Age: 74
End: 2019-06-08
Attending: PHYSICIAN ASSISTANT
Payer: COMMERCIAL

## 2019-06-08 ENCOUNTER — APPOINTMENT (OUTPATIENT)
Dept: LAB | Facility: HOSPITAL | Age: 74
End: 2019-06-08
Payer: COMMERCIAL

## 2019-06-08 DIAGNOSIS — Z01.818 PRE-OP TESTING: ICD-10-CM

## 2019-06-08 DIAGNOSIS — G56.21 ULNAR NEUROPATHY OF RIGHT UPPER EXTREMITY: ICD-10-CM

## 2019-06-08 DIAGNOSIS — G56.22 ULNAR NEUROPATHY OF LEFT UPPER EXTREMITY: ICD-10-CM

## 2019-06-08 DIAGNOSIS — G56.21 ULNAR NEUROPATHY AT ELBOW, RIGHT: ICD-10-CM

## 2019-06-08 DIAGNOSIS — G56.03 CARPAL TUNNEL SYNDROME, BILATERAL UPPER LIMBS: ICD-10-CM

## 2019-06-08 PROCEDURE — 36415 COLL VENOUS BLD VENIPUNCTURE: CPT

## 2019-06-08 PROCEDURE — 85730 THROMBOPLASTIN TIME PARTIAL: CPT

## 2019-06-08 PROCEDURE — 85610 PROTHROMBIN TIME: CPT

## 2019-06-08 PROCEDURE — 80053 COMPREHEN METABOLIC PANEL: CPT

## 2019-06-08 PROCEDURE — 93010 ELECTROCARDIOGRAM REPORT: CPT | Performed by: INTERNAL MEDICINE

## 2019-06-08 PROCEDURE — 93005 ELECTROCARDIOGRAM TRACING: CPT

## 2019-06-08 PROCEDURE — 85025 COMPLETE CBC W/AUTO DIFF WBC: CPT

## 2019-06-10 NOTE — TELEPHONE ENCOUNTER
Left a detailed message on patient's confidential voicemail (ok per HIPPA)  informing below. Advised to call office with any additional questions.      Nothing further needed for surgery 6/12/19

## 2019-06-10 NOTE — TELEPHONE ENCOUNTER
Spoke with patient to check on PCP clearance. He stated he has not scheduled an appointment to see his PCP yet but will try to get in to see his PCP today for clearance.      Noted Pre-op labs completed and resulted in Epic

## 2019-06-10 NOTE — TELEPHONE ENCOUNTER
Spoke with Dr. Alicia Shaver. OK to proceed with surgery. Pt's son in a hospitalist, Dr. Demario Freed, and Dr. Alicia Shaver discussed case with him who felt it was safe to proceed.

## 2019-06-12 ENCOUNTER — ANESTHESIA EVENT (OUTPATIENT)
Dept: SURGERY | Facility: HOSPITAL | Age: 74
End: 2019-06-12
Payer: COMMERCIAL

## 2019-06-12 ENCOUNTER — ANESTHESIA (OUTPATIENT)
Dept: SURGERY | Facility: HOSPITAL | Age: 74
End: 2019-06-12
Payer: COMMERCIAL

## 2019-06-12 ENCOUNTER — HOSPITAL ENCOUNTER (OUTPATIENT)
Facility: HOSPITAL | Age: 74
Setting detail: HOSPITAL OUTPATIENT SURGERY
Discharge: HOME | End: 2019-06-12
Attending: NEUROLOGICAL SURGERY | Admitting: NEUROLOGICAL SURGERY
Payer: COMMERCIAL

## 2019-06-12 VITALS
OXYGEN SATURATION: 98 % | WEIGHT: 214.5 LBS | HEIGHT: 64 IN | HEART RATE: 74 BPM | DIASTOLIC BLOOD PRESSURE: 58 MMHG | BODY MASS INDEX: 36.62 KG/M2 | SYSTOLIC BLOOD PRESSURE: 118 MMHG | TEMPERATURE: 97 F | RESPIRATION RATE: 18 BRPM

## 2019-06-12 DIAGNOSIS — G56.21 ULNAR NEUROPATHY AT ELBOW, RIGHT: ICD-10-CM

## 2019-06-12 DIAGNOSIS — G56.21 ULNAR NEUROPATHY OF RIGHT UPPER EXTREMITY: Primary | ICD-10-CM

## 2019-06-12 DIAGNOSIS — G56.03 CARPAL TUNNEL SYNDROME, BILATERAL UPPER LIMBS: ICD-10-CM

## 2019-06-12 DIAGNOSIS — G56.22 ULNAR NEUROPATHY OF LEFT UPPER EXTREMITY: ICD-10-CM

## 2019-06-12 PROCEDURE — 82962 GLUCOSE BLOOD TEST: CPT

## 2019-06-12 PROCEDURE — 01S40ZZ REPOSITION ULNAR NERVE, OPEN APPROACH: ICD-10-PCS | Performed by: NEUROLOGICAL SURGERY

## 2019-06-12 RX ORDER — SODIUM CHLORIDE, SODIUM LACTATE, POTASSIUM CHLORIDE, CALCIUM CHLORIDE 600; 310; 30; 20 MG/100ML; MG/100ML; MG/100ML; MG/100ML
INJECTION, SOLUTION INTRAVENOUS CONTINUOUS
Status: DISCONTINUED | OUTPATIENT
Start: 2019-06-12 | End: 2019-06-12

## 2019-06-12 RX ORDER — NALOXONE HYDROCHLORIDE 0.4 MG/ML
80 INJECTION, SOLUTION INTRAMUSCULAR; INTRAVENOUS; SUBCUTANEOUS AS NEEDED
Status: DISCONTINUED | OUTPATIENT
Start: 2019-06-12 | End: 2019-06-12

## 2019-06-12 RX ORDER — CEFAZOLIN SODIUM/WATER 2 G/20 ML
2 SYRINGE (ML) INTRAVENOUS ONCE
Status: COMPLETED | OUTPATIENT
Start: 2019-06-12 | End: 2019-06-12

## 2019-06-12 RX ORDER — DEXTROSE MONOHYDRATE 25 G/50ML
50 INJECTION, SOLUTION INTRAVENOUS
Status: DISCONTINUED | OUTPATIENT
Start: 2019-06-12 | End: 2019-06-12

## 2019-06-12 RX ORDER — ACETAMINOPHEN 500 MG
1000 TABLET ORAL EVERY 6 HOURS PRN
COMMUNITY
End: 2020-01-23 | Stop reason: ALTCHOICE

## 2019-06-12 RX ORDER — ACETAMINOPHEN 500 MG
1000 TABLET ORAL ONCE
Status: DISCONTINUED | OUTPATIENT
Start: 2019-06-12 | End: 2019-06-12

## 2019-06-12 RX ORDER — BUPIVACAINE HYDROCHLORIDE AND EPINEPHRINE 2.5; 5 MG/ML; UG/ML
INJECTION, SOLUTION EPIDURAL; INFILTRATION; INTRACAUDAL; PERINEURAL AS NEEDED
Status: DISCONTINUED | OUTPATIENT
Start: 2019-06-12 | End: 2019-06-12 | Stop reason: HOSPADM

## 2019-06-12 RX ORDER — HYDROCODONE BITARTRATE AND ACETAMINOPHEN 5; 325 MG/1; MG/1
1 TABLET ORAL EVERY 6 HOURS PRN
Qty: 20 TABLET | Refills: 0 | Status: SHIPPED | OUTPATIENT
Start: 2019-06-12 | End: 2019-12-04 | Stop reason: ALTCHOICE

## 2019-06-12 RX ORDER — HYDROCODONE BITARTRATE AND ACETAMINOPHEN 5; 325 MG/1; MG/1
2 TABLET ORAL AS NEEDED
Status: DISCONTINUED | OUTPATIENT
Start: 2019-06-12 | End: 2019-06-12

## 2019-06-12 RX ORDER — INSULIN ASPART 100 [IU]/ML
INJECTION, SOLUTION INTRAVENOUS; SUBCUTANEOUS
Status: COMPLETED
Start: 2019-06-12 | End: 2019-06-12

## 2019-06-12 RX ORDER — HYDROCODONE BITARTRATE AND ACETAMINOPHEN 5; 325 MG/1; MG/1
1 TABLET ORAL AS NEEDED
Status: DISCONTINUED | OUTPATIENT
Start: 2019-06-12 | End: 2019-06-12

## 2019-06-12 RX ORDER — INSULIN ASPART 100 [IU]/ML
INJECTION, SOLUTION INTRAVENOUS; SUBCUTANEOUS ONCE
Status: COMPLETED | OUTPATIENT
Start: 2019-06-12 | End: 2019-06-12

## 2019-06-12 RX ORDER — CEPHALEXIN 500 MG/1
500 CAPSULE ORAL 2 TIMES DAILY
Qty: 6 CAPSULE | Refills: 0 | Status: SHIPPED | OUTPATIENT
Start: 2019-06-12 | End: 2019-06-15

## 2019-06-12 RX ORDER — HYDROMORPHONE HYDROCHLORIDE 1 MG/ML
0.4 INJECTION, SOLUTION INTRAMUSCULAR; INTRAVENOUS; SUBCUTANEOUS EVERY 5 MIN PRN
Status: DISCONTINUED | OUTPATIENT
Start: 2019-06-12 | End: 2019-06-12

## 2019-06-12 RX ORDER — ONDANSETRON 2 MG/ML
4 INJECTION INTRAMUSCULAR; INTRAVENOUS AS NEEDED
Status: DISCONTINUED | OUTPATIENT
Start: 2019-06-12 | End: 2019-06-12

## 2019-06-12 RX ORDER — METOCLOPRAMIDE HYDROCHLORIDE 5 MG/ML
10 INJECTION INTRAMUSCULAR; INTRAVENOUS ONCE
Status: COMPLETED | OUTPATIENT
Start: 2019-06-12 | End: 2019-06-12

## 2019-06-12 RX ORDER — DEXTROSE MONOHYDRATE 25 G/50ML
50 INJECTION, SOLUTION INTRAVENOUS
Status: DISCONTINUED | OUTPATIENT
Start: 2019-06-12 | End: 2019-06-12 | Stop reason: HOSPADM

## 2019-06-12 NOTE — ANESTHESIA POSTPROCEDURE EVALUATION
[place] [address] Patient Status:  Hospital Outpatient Surgery   Age/Gender 68year old male MRN [id number]   [facility] SURGERY Attending Sonu Schwartz MD   Hosp Day # 0 PCP Josette Brand MD       Anesthesia Post-

## 2019-06-12 NOTE — BRIEF OP NOTE
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

## 2019-06-12 NOTE — ANESTHESIA POSTPROCEDURE EVALUATION
[place] [address] Patient Status:  Hospital Outpatient Surgery   Age/Gender 68year old male MRN [id number]   [facility] SURGERY Attending Libertad Wyatt MD   Hosp Day # 0 PCP Jason Dumont MD       Anesthesia Post-

## 2019-06-12 NOTE — OPERATIVE REPORT
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

## 2019-06-12 NOTE — H&P
Cj Quiros   Physician Assistant   Physician Assistant   Progress Notes      Signed   Encounter Date:  2019               Signed               Neurosurgery Clinic Visit  2019           Tariq Salcedo PCP:  Lian Yates MD    Imaging was reviewed with the patient. He is having continued weakness of his right hand with atrophy and worsening clawing of his 4th/5th fingers. Recommend right ulnar nerve transposition. Will schedule this.   After he recovers from this, we will cons The above referenced H&P was reviewed by Kassidy Weaver PA-C on 06/12/19, the patient was examined and no significant changes have occurred in the patient's condition since the H&P was performed. ? Extensive surgical discussion was provided in the office w

## 2019-06-12 NOTE — ANESTHESIA PREPROCEDURE EVALUATION
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 Disp:  Rfl: 1   insulin aspart (NOVOLOG) 100 UNIT/ML Subcutaneous Solution Inject 20 Units into the skin 2 (two) times daily. Disp:  Rfl:        Allergies: Patient has no known allergies. 06/08/2019     06/08/2019    CO2 29.0 06/08/2019    BUN 16 06/08/2019    CREATSERUM 0.80 06/08/2019     (H) 06/08/2019    CA 8.7 06/08/2019     Lab Results   Component Value Date    INR 0.89 (L) 06/08/2019         Airway      Mallampati: II  M

## 2019-06-28 ENCOUNTER — OFFICE VISIT (OUTPATIENT)
Dept: SURGERY | Facility: CLINIC | Age: 74
End: 2019-06-28

## 2019-06-28 VITALS — SYSTOLIC BLOOD PRESSURE: 118 MMHG | HEART RATE: 82 BPM | DIASTOLIC BLOOD PRESSURE: 72 MMHG

## 2019-06-28 DIAGNOSIS — M47.22 CERVICAL SPONDYLOSIS WITH RADICULOPATHY: ICD-10-CM

## 2019-06-28 DIAGNOSIS — G56.21 ULNAR NEUROPATHY AT ELBOW, RIGHT: Primary | ICD-10-CM

## 2019-06-28 PROCEDURE — 99024 POSTOP FOLLOW-UP VISIT: CPT | Performed by: PHYSICIAN ASSISTANT

## 2019-06-28 NOTE — PROGRESS NOTES
Neurosurgery Clinic Visit  2019    Tariq Pascual PCP:  Vivian Capellan MD    1945 MRN [id number]     HISTORY OF PRESENT ILLNESS:  Ángel Rebeca Pascual is a(n) 68year old male who is here 2 weeks s/p transposition of ulnar nerve.   He has mi would need a  C5-6 and C6-7 ACDF. Will re-evaluate again on 7/23 and discuss options at that time. Pt appreciative. Total time spent: 15 Minutes  Greater than 50% of the time was spent on counseling/coordination of care.   Avtar Barnes of education / Adan Mcbride

## 2019-07-11 ENCOUNTER — OFFICE VISIT (OUTPATIENT)
Dept: OCCUPATIONAL MEDICINE | Age: 74
End: 2019-07-11
Attending: PHYSICIAN ASSISTANT
Payer: COMMERCIAL

## 2019-07-11 DIAGNOSIS — G56.21 ULNAR NEUROPATHY AT ELBOW, RIGHT: ICD-10-CM

## 2019-07-11 DIAGNOSIS — M47.22 CERVICAL SPONDYLOSIS WITH RADICULOPATHY: ICD-10-CM

## 2019-07-11 PROCEDURE — 97110 THERAPEUTIC EXERCISES: CPT

## 2019-07-11 PROCEDURE — 97167 OT EVAL HIGH COMPLEX 60 MIN: CPT

## 2019-07-11 NOTE — PROGRESS NOTES
OCCUPATIONAL THERAPY UPPER EXTREMITY EVALUATION   Referring Physician: Dr. Ananth Short  Diagnosis: ulnar neuropathy at elbow, right, s/p ulnar nerve transposition    Date of Service: 7/11/2019     PATIENT SUMMARY   Tariq Witt is a 68year old male who p pain. Skilled Occupational Therapy is medically necessary to address the above impairments and reach functional goals. Precautions: None  OBJECTIVE    OBSERVATION: muscle wasting of the intrinsic musculature of the right hand.  Pt also demonstrate atrop met in 10 visits)  1. Pt will be independent and compliant with comprehensive HEP to maintain progress achieved in OT  2. Pt will be fit with a figure 8 splint to improve functional use of the right hand for ADL  3.   Pt will improve [address] as demonstrated by

## 2019-07-18 ENCOUNTER — APPOINTMENT (OUTPATIENT)
Dept: PHYSICAL THERAPY | Facility: HOSPITAL | Age: 74
End: 2019-07-18
Attending: PHYSICIAN ASSISTANT
Payer: COMMERCIAL

## 2019-07-22 ENCOUNTER — APPOINTMENT (OUTPATIENT)
Dept: PHYSICAL THERAPY | Facility: HOSPITAL | Age: 74
End: 2019-07-22
Attending: PHYSICIAN ASSISTANT
Payer: COMMERCIAL

## 2019-07-22 ENCOUNTER — OFFICE VISIT (OUTPATIENT)
Dept: OCCUPATIONAL MEDICINE | Age: 74
End: 2019-07-22
Attending: PHYSICIAN ASSISTANT
Payer: COMMERCIAL

## 2019-07-22 PROCEDURE — 97112 NEUROMUSCULAR REEDUCATION: CPT

## 2019-07-22 PROCEDURE — 97110 THERAPEUTIC EXERCISES: CPT

## 2019-07-22 NOTE — PROGRESS NOTES
Dx: ulnar neuropathy       Insurance (Authorized # of Visits):  25          Authorizing Physician: Dr. Dorothy Murphy  Next MD visit: none scheduled  Fall Risk: standard         Precautions: n/a             Subjective: \"I haven't been successful with my exercises.

## 2019-07-23 ENCOUNTER — OFFICE VISIT (OUTPATIENT)
Dept: SURGERY | Facility: CLINIC | Age: 74
End: 2019-07-23

## 2019-07-23 ENCOUNTER — APPOINTMENT (OUTPATIENT)
Dept: PHYSICAL THERAPY | Facility: HOSPITAL | Age: 74
End: 2019-07-23
Attending: PHYSICIAN ASSISTANT
Payer: COMMERCIAL

## 2019-07-23 VITALS — HEART RATE: 74 BPM | SYSTOLIC BLOOD PRESSURE: 130 MMHG | DIASTOLIC BLOOD PRESSURE: 70 MMHG

## 2019-07-23 DIAGNOSIS — G56.21 ULNAR NEUROPATHY AT ELBOW, RIGHT: Primary | ICD-10-CM

## 2019-07-23 DIAGNOSIS — M47.22 CERVICAL SPONDYLOSIS WITH RADICULOPATHY: ICD-10-CM

## 2019-07-23 DIAGNOSIS — M25.511 ACUTE PAIN OF RIGHT SHOULDER: ICD-10-CM

## 2019-07-23 PROCEDURE — 99024 POSTOP FOLLOW-UP VISIT: CPT | Performed by: PHYSICIAN ASSISTANT

## 2019-07-23 NOTE — PROGRESS NOTES
Neurosurgery Clinic Visit  2019    Tariq Hodges PCP:  Ezequiel Salazar MD    1945 MRN [id number]     HISTORY OF PRESENT ILLNESS:  Daryle Cooley ALI Jeanine Bears is a(n) 69year old male who is here 6 weeks s/p transposition of ulnar nerve.   Since his may also be related to cervical radiculopathy. Referral given for PT to work with his cervical spine, right shoulder, and right arm/hand. He discussed addressing his left sided ulnar neuropathy and carpal tunnel syndrome.   We offered surgical interventio

## 2019-07-25 ENCOUNTER — OFFICE VISIT (OUTPATIENT)
Dept: OCCUPATIONAL MEDICINE | Age: 74
End: 2019-07-25
Attending: PHYSICIAN ASSISTANT
Payer: COMMERCIAL

## 2019-07-25 ENCOUNTER — APPOINTMENT (OUTPATIENT)
Dept: PHYSICAL THERAPY | Facility: HOSPITAL | Age: 74
End: 2019-07-25
Attending: PHYSICIAN ASSISTANT
Payer: COMMERCIAL

## 2019-07-25 PROCEDURE — 97110 THERAPEUTIC EXERCISES: CPT

## 2019-07-25 PROCEDURE — 97112 NEUROMUSCULAR REEDUCATION: CPT

## 2019-07-25 NOTE — PROGRESS NOTES
Dx: ulnar neuropathy       Insurance (Authorized # of Visits):  25          Authorizing Physician: Dr. Dave Pugh  Next MD visit: none scheduled  Fall Risk: standard         Precautions: n/a             Subjective: \"Doctor was happy with how my hand my looked. Treatment: 45 min  Total Treatment Time: 45 min

## 2019-07-29 ENCOUNTER — APPOINTMENT (OUTPATIENT)
Dept: PHYSICAL THERAPY | Facility: HOSPITAL | Age: 74
End: 2019-07-29
Attending: PHYSICIAN ASSISTANT
Payer: COMMERCIAL

## 2019-07-29 ENCOUNTER — OFFICE VISIT (OUTPATIENT)
Dept: OCCUPATIONAL MEDICINE | Age: 74
End: 2019-07-29
Attending: PHYSICIAN ASSISTANT
Payer: COMMERCIAL

## 2019-07-29 PROCEDURE — 97760 ORTHOTIC MGMT&TRAING 1ST ENC: CPT

## 2019-07-29 PROCEDURE — 97110 THERAPEUTIC EXERCISES: CPT

## 2019-07-29 NOTE — PROGRESS NOTES
Dx: ulnar neuropathy       Insurance (Authorized # of Visits):  25          Authorizing Physician: Dr. Faustina Woodard  Next MD visit: none scheduled  Fall Risk: standard         Precautions: n/a             Subjective: \"I'm trying to do my exercises.  I have to tra resting extension splint for night      Pegboard (2 peg) with in-hand manipulation to place       Isometric digit flexion      HEP: HEP upgrades in bold    Charges: 1 ortho manage, 2 TE    Total Timed Treatment: 45 min  Total Treatment Time: 45 min

## 2019-08-01 ENCOUNTER — OFFICE VISIT (OUTPATIENT)
Dept: OCCUPATIONAL MEDICINE | Age: 74
End: 2019-08-01
Attending: PHYSICIAN ASSISTANT
Payer: COMMERCIAL

## 2019-08-01 PROCEDURE — 97110 THERAPEUTIC EXERCISES: CPT

## 2019-08-01 PROCEDURE — 97140 MANUAL THERAPY 1/> REGIONS: CPT

## 2019-08-01 NOTE — PROGRESS NOTES
Dx: ulnar neuropathy       Insurance (Authorized # of Visits):  25          Authorizing Physician: Dr. Elisa River  Next MD visit: none scheduled  Fall Risk: standard         Precautions: n/a             Subjective: \"I wear the splint when I sleep and it doesn' opposition tasks Blokus for in-hand manipulation    In-hand manipulation Yellow theraball  Squeeze and reposition Fabricated resting extension splint for night Squeeze/release theraball     Pegboard (2 peg) with in-hand manipulation to place       Isometri

## 2019-08-05 ENCOUNTER — OFFICE VISIT (OUTPATIENT)
Dept: OCCUPATIONAL MEDICINE | Age: 74
End: 2019-08-05
Attending: PHYSICIAN ASSISTANT
Payer: COMMERCIAL

## 2019-08-05 ENCOUNTER — APPOINTMENT (OUTPATIENT)
Dept: PHYSICAL THERAPY | Facility: HOSPITAL | Age: 74
End: 2019-08-05
Attending: PHYSICIAN ASSISTANT
Payer: COMMERCIAL

## 2019-08-05 PROCEDURE — 97110 THERAPEUTIC EXERCISES: CPT

## 2019-08-05 PROCEDURE — 97140 MANUAL THERAPY 1/> REGIONS: CPT

## 2019-08-05 NOTE — PROGRESS NOTES
Dx: ulnar neuropathy       Insurance (Authorized # of Visits):  25          Authorizing Physician: Dr. Solange Cameron  Next MD visit: none scheduled  Fall Risk: standard         Precautions: n/a             Subjective: \"I really think it might be moving a little b Digit isolation tasks    Digit abd/add   Digit isolation tasks Fidget cube  -isolation of thumb  -roller  -button push  -digit isolation AAROM opposition tasks Blokus for in-hand manipulation 1DOI isometric tasks  Place/hold tasks    Thumb adduction   In-h

## 2019-08-06 ENCOUNTER — HOSPITAL ENCOUNTER (OUTPATIENT)
Dept: PHYSICAL THERAPY | Facility: HOSPITAL | Age: 74
Setting detail: THERAPIES SERIES
Discharge: HOME | End: 2019-08-06
Attending: PHYSICIAN ASSISTANT
Payer: COMMERCIAL

## 2019-08-06 PROCEDURE — 97110 THERAPEUTIC EXERCISES: CPT

## 2019-08-06 PROCEDURE — 97163 PT EVAL HIGH COMPLEX 45 MIN: CPT

## 2019-08-06 PROCEDURE — 97140 MANUAL THERAPY 1/> REGIONS: CPT

## 2019-08-07 ENCOUNTER — APPOINTMENT (OUTPATIENT)
Dept: PHYSICAL THERAPY | Facility: HOSPITAL | Age: 74
End: 2019-08-07
Attending: PHYSICIAN ASSISTANT
Payer: COMMERCIAL

## 2019-08-07 NOTE — PROGRESS NOTES
SPINE EVALUATION:   Referring Provider: Pooja Yip  Diagnosis: ulnar neuropathy R elbow, cervical spondylosis with radiculopathy, R shoulder pain     Date of Service: 8/6/2019     PATIENT SUMMARY   Tariq Gomezshantanu eBnz is a 69year old RHD male who presen understanding and performs HEP correctly without reported pain. Skilled Physical Therapy is medically necessary to address the above impairments and reach functional goals.      Precautions:  Fall Risk  OBJECTIVE:   Observation/Posture: increased thoracic k strengthening    Charges: PT Eval High Complexity, manual x 1, therex x 1      Total Timed Treatment: 25 min     Total Treatment Time: 60 min     Based on clinical rationale and outcome measures, this evaluation involved High Complexity decision making due

## 2019-08-08 ENCOUNTER — OFFICE VISIT (OUTPATIENT)
Dept: OCCUPATIONAL MEDICINE | Age: 74
End: 2019-08-08
Attending: PHYSICIAN ASSISTANT
Payer: COMMERCIAL

## 2019-08-08 ENCOUNTER — TELEPHONE (OUTPATIENT)
Dept: PHYSICAL THERAPY | Age: 74
End: 2019-08-08

## 2019-08-08 PROCEDURE — 97110 THERAPEUTIC EXERCISES: CPT

## 2019-08-08 NOTE — PROGRESS NOTES
Dx: ulnar neuropathy       Insurance (Authorized # of Visits):  25          Authorizing Physician: Dr. Amber Silva  Next MD visit: none scheduled  Fall Risk: standard         Precautions: n/a             Subjective: \"I wear the splint and in the morning the fin flexion/extension In-hand manipulation with dice   Yellow putty exercises  -/reposition  -roll/pinch   Roll/pinch yellow foam cubes Targeting to increase movement to D2 Digit abd/add on sliders    Place/hold to digit abd, AROM in adduction Digit isolat

## 2019-08-12 ENCOUNTER — OFFICE VISIT (OUTPATIENT)
Dept: OCCUPATIONAL MEDICINE | Age: 74
End: 2019-08-12
Attending: PHYSICIAN ASSISTANT
Payer: COMMERCIAL

## 2019-08-12 ENCOUNTER — APPOINTMENT (OUTPATIENT)
Dept: PHYSICAL THERAPY | Facility: HOSPITAL | Age: 74
End: 2019-08-12
Attending: PHYSICIAN ASSISTANT
Payer: COMMERCIAL

## 2019-08-12 PROCEDURE — 97110 THERAPEUTIC EXERCISES: CPT

## 2019-08-12 NOTE — PROGRESS NOTES
Dx: ulnar neuropathy       Insurance (Authorized # of Visits):  25          Authorizing Physician: Dr. Mian Jose  Next MD visit: none scheduled  Fall Risk: standard         Precautions: n/a             Subjective: \"I didn't get to exercises as much as I shoul egg Red digiflex full  x 20  Yellow each digit x 10  Lateral pinch x 20 with assist   AAROM opposition tasks Blokus for in-hand manipulation 1DOI isometric tasks  Place/hold tasks    Thumb adduction Bar bend beige flex bar x 20 Opposition tasks with as

## 2019-08-13 ENCOUNTER — HOSPITAL ENCOUNTER (OUTPATIENT)
Dept: PHYSICAL THERAPY | Facility: HOSPITAL | Age: 74
Setting detail: THERAPIES SERIES
Discharge: HOME | End: 2019-08-13
Attending: PHYSICIAN ASSISTANT
Payer: COMMERCIAL

## 2019-08-13 PROCEDURE — 97110 THERAPEUTIC EXERCISES: CPT

## 2019-08-13 PROCEDURE — 97140 MANUAL THERAPY 1/> REGIONS: CPT

## 2019-08-13 NOTE — PROGRESS NOTES
Dx: ulnar neuropathy R elbow, cervical spondylosis with radiculopathy, R shoulder pain             Authorized # of Visits:  8         Next MD visit: none scheduled  Fall Risk: standard         Precautions: n/a             Subjective: Patient reports contin Wand flexion, 10         Manual cervical traction, 5 min         Cervical PPIVM rotation to L C2-5, 5 min           Charges: Nicola 2( 35 min) manual x 1 ( 10 min)   Total Timed Treatment: 45 min     Total Treatment Time: 45 min

## 2019-08-15 ENCOUNTER — APPOINTMENT (OUTPATIENT)
Dept: PHYSICAL THERAPY | Facility: HOSPITAL | Age: 74
End: 2019-08-15
Attending: PHYSICIAN ASSISTANT
Payer: COMMERCIAL

## 2019-08-19 ENCOUNTER — TELEPHONE (OUTPATIENT)
Dept: PHYSICAL THERAPY | Age: 74
End: 2019-08-19

## 2019-08-19 ENCOUNTER — HOSPITAL ENCOUNTER (OUTPATIENT)
Dept: PHYSICAL THERAPY | Facility: HOSPITAL | Age: 74
Setting detail: THERAPIES SERIES
Discharge: HOME | End: 2019-08-19
Attending: PHYSICIAN ASSISTANT
Payer: COMMERCIAL

## 2019-08-19 PROCEDURE — 97110 THERAPEUTIC EXERCISES: CPT

## 2019-08-19 PROCEDURE — 97140 MANUAL THERAPY 1/> REGIONS: CPT

## 2019-08-19 NOTE — PROGRESS NOTES
Dx: ulnar neuropathy R elbow, cervical spondylosis with radiculopathy, R shoulder pain             Authorized # of Visits:  8         Next MD visit: 8/29/19  Fall Risk: standard         Precautions: n/a             Subjective: Patient reports that he has n assistance with  on the R UBE L3 x 4 min with manual assist to keep  on R      Isometric ER vs wall, 3x10 Isometric ER vs wall, 3x10      Red TB ER, 10  Row, 2x15  Elbow extension, 2x15 RTB rows 2 x 15, tied to hand      5# cuff weight biceps curl,

## 2019-08-21 ENCOUNTER — HOSPITAL ENCOUNTER (OUTPATIENT)
Dept: PHYSICAL THERAPY | Facility: HOSPITAL | Age: 74
Setting detail: THERAPIES SERIES
Discharge: HOME | End: 2019-08-21
Attending: PHYSICIAN ASSISTANT
Payer: COMMERCIAL

## 2019-08-21 PROCEDURE — 97110 THERAPEUTIC EXERCISES: CPT

## 2019-08-21 PROCEDURE — 97140 MANUAL THERAPY 1/> REGIONS: CPT

## 2019-08-22 NOTE — PROGRESS NOTES
Dx: ulnar neuropathy R elbow, cervical spondylosis with radiculopathy, R shoulder pain             Authorized # of Visits:  8         Next MD visit: 8/29/19  Fall Risk: standard         Precautions: n/a             Subjective: Patient arrived to session wa patient-reported pain as result of fall, advised to continue to monitor, was tender to the touch R-sided lower thoracic and upper lumbar paraspinals. R shoulder continues to present with significant weakness and loss of functional mobility.      Goals:   Pa traction, 5 min Manual cervical traction, 5 min R shoulder STM x 3 min     Cervical PPIVM rotation to L C2-5, 5 min Cervical PPIVM rotation to L C2-5, 3 min R shoulder posterior and inferior glides x 3 min       Charges:  Therex x 2, Manual x 1    Total Adilson Moors

## 2019-08-26 ENCOUNTER — HOSPITAL ENCOUNTER (OUTPATIENT)
Dept: PHYSICAL THERAPY | Facility: HOSPITAL | Age: 74
Setting detail: THERAPIES SERIES
Discharge: HOME | End: 2019-08-26
Attending: PHYSICIAN ASSISTANT
Payer: COMMERCIAL

## 2019-08-26 ENCOUNTER — TELEPHONE (OUTPATIENT)
Dept: SURGERY | Facility: CLINIC | Age: 74
End: 2019-08-26

## 2019-08-26 PROCEDURE — 97140 MANUAL THERAPY 1/> REGIONS: CPT

## 2019-08-26 PROCEDURE — 97110 THERAPEUTIC EXERCISES: CPT

## 2019-08-26 NOTE — PROGRESS NOTES
Dx: ulnar neuropathy R elbow, cervical spondylosis with radiculopathy, R shoulder pain             Authorized # of Visits:  8         Next MD visit: 8/29/19  Fall Risk: standard         Precautions: n/a             Subjective: Patient reports he is feeling on R UBE L3 x 4 min with manual assist to keep  on R UBE L3 x 4 min with manual assist to keep  on R    Isometric ER vs wall, 3x10 Isometric ER vs wall, 3x10 Isometric ER vs wall, 3x10 YTB ER with manual assist at elbow 2 x 10    Red TB ER, 10  Row

## 2019-08-28 ENCOUNTER — HOSPITAL ENCOUNTER (OUTPATIENT)
Dept: PHYSICAL THERAPY | Facility: HOSPITAL | Age: 74
Setting detail: THERAPIES SERIES
Discharge: HOME | End: 2019-08-28
Attending: PHYSICIAN ASSISTANT
Payer: COMMERCIAL

## 2019-08-28 PROCEDURE — 97140 MANUAL THERAPY 1/> REGIONS: CPT

## 2019-08-28 PROCEDURE — 97110 THERAPEUTIC EXERCISES: CPT

## 2019-08-28 NOTE — PROGRESS NOTES
Dx: ulnar neuropathy R elbow, cervical spondylosis with radiculopathy, R shoulder pain             Authorized # of Visits:  8         Next MD visit: 8/29/19  Fall Risk: standard         Precautions: n/a             Subjective: Patient reports that his R sh future injury within 8 weeks. - progress    Plan: continue PT to maximize function of RUE. Continue strengthening progression within tolerance.  Follow-up after MD appt 8/29  Date: 8/13/2019  Tx#: 2/8 Date: 8/19/19  Tx#: 3/8 Date: 8/21/19  Tx#: 4/8 Date: 8/ x 8 - supine manual resisted serratus punch x 10 (fatigued)  - supine rhythmic stab at 90, 2 x 30s (fatigued)  - supine press, 2# x 20   Wand flexion, 10 Supine AROM flexion to 90 2 x 10, occasional AA Supine AROM flexion to 90 2 x 10, occasional AA Supine

## 2019-08-29 ENCOUNTER — OFFICE VISIT (OUTPATIENT)
Dept: SURGERY | Facility: CLINIC | Age: 74
End: 2019-08-29

## 2019-08-29 VITALS — DIASTOLIC BLOOD PRESSURE: 74 MMHG | HEART RATE: 70 BPM | SYSTOLIC BLOOD PRESSURE: 124 MMHG

## 2019-08-29 DIAGNOSIS — G56.21 ULNAR NEUROPATHY AT ELBOW, RIGHT: Primary | ICD-10-CM

## 2019-08-29 PROCEDURE — 99024 POSTOP FOLLOW-UP VISIT: CPT | Performed by: NEUROLOGICAL SURGERY

## 2019-08-29 NOTE — PROGRESS NOTES
Neurosurgery Clinic Visit  2019    Munawar ALI Baird Cowden PCP:  Misty Dixon MD    1945 MRN [id number]     HISTORY OF PRESENT ILLNESS:  Mika Mclain is a(n) 69year old male here for follow-up  Patient is doing a little better  He is got

## 2019-08-29 NOTE — PROGRESS NOTES
Pt is here follow up with PT     Pt states that he has been doing better since LOV. Pt states that he has been working with PT and OT  2 times weekly.

## 2019-09-04 ENCOUNTER — HOSPITAL ENCOUNTER (OUTPATIENT)
Dept: PHYSICAL THERAPY | Facility: HOSPITAL | Age: 74
Setting detail: THERAPIES SERIES
Discharge: HOME | End: 2019-09-04
Attending: PHYSICIAN ASSISTANT
Payer: COMMERCIAL

## 2019-09-04 PROCEDURE — 97140 MANUAL THERAPY 1/> REGIONS: CPT

## 2019-09-04 PROCEDURE — 97110 THERAPEUTIC EXERCISES: CPT

## 2019-09-04 NOTE — PROGRESS NOTES
Dx: ulnar neuropathy R elbow, cervical spondylosis with radiculopathy, R shoulder pain             Authorized # of Visits:  8         Next MD visit: 8/29/19  Fall Risk: standard         Precautions: n/a             Subjective: Patient states that he saw th 8/21/19  Tx#: 4/8 Date: 8/26/19  Tx#: 5/8 Date: 8/28/19  Tx#: 6/8 Date: 9/4/19  Tx#: 7/8   UBE, lv 3, 3 min - assistance with  on the R UBE L3 x 4 min with manual assist to keep  on R UBE L3 x 4 min with manual assist to keep  on R UBE L3 x 4 m supine rhythmic stab at 90, 2 x 30s (fatigued)  - supine press, 2#, 2 x 8 - supine manual resisted serratus punch x 10 (fatigued)  - supine rhythmic stab at 90, 2 x 30s (fatigued)  - supine press, 2# x 20 - supine manual resisted serratus punch x 10 (fatig

## 2019-09-11 ENCOUNTER — HOSPITAL ENCOUNTER (OUTPATIENT)
Dept: PHYSICAL THERAPY | Facility: HOSPITAL | Age: 74
Setting detail: THERAPIES SERIES
Discharge: HOME | End: 2019-09-11
Attending: PHYSICIAN ASSISTANT
Payer: COMMERCIAL

## 2019-09-11 PROCEDURE — 97140 MANUAL THERAPY 1/> REGIONS: CPT

## 2019-09-11 PROCEDURE — 97110 THERAPEUTIC EXERCISES: CPT

## 2019-09-11 NOTE — PROGRESS NOTES
Progress Summary  Dx: ulnar neuropathy R elbow, cervical spondylosis with radiculopathy, R shoulder pain             Authorized # of Visits:  8         Next MD visit: 8/29/19  Fall Risk: standard         Precautions: n/a         Pt has attended 8 visits i active motion with each rep due to weakness and fatigue. This is also consistently seen with abduction. In supine, he is gaining AROM with the assist of gravity, as well as gaining PROM with manual intervention.  At this time, patient is progressing, but ha Date: 9/11/19  Tx#: 8/8   UBE, lv 3, 3 min - assistance with  on the R UBE L3 x 4 min with manual assist to keep  on R UBE L3 x 4 min with manual assist to keep  on R UBE L3 x 4 min with manual assist to keep  on R UBE L4 x 4 min with Tony Cat SL AAROM abd to 100 deg, Ifeanyi to maintain neutral 2 x 10 - SL ER with towel roll, occasional AAROM 3 x 10  - SL AAROM abd to 100 deg, Ifeanyi to maintain neutral 2 x 10 - supine manual resisted serratus punch x 10 (fatigued)  - supine rhythmic stab at 90, 2 x

## 2019-09-12 ENCOUNTER — OFFICE VISIT (OUTPATIENT)
Dept: OCCUPATIONAL MEDICINE | Age: 74
End: 2019-09-12
Attending: SPECIALIST
Payer: COMMERCIAL

## 2019-09-12 PROCEDURE — 97112 NEUROMUSCULAR REEDUCATION: CPT

## 2019-09-12 PROCEDURE — 97110 THERAPEUTIC EXERCISES: CPT

## 2019-09-12 NOTE — PROGRESS NOTES
Dx: ulnar neuropathy       Insurance (Authorized # of Visits):  25          Authorizing Physician: Dr. Rashawn Arreola  Next MD visit: none scheduled  Fall Risk: standard         Precautions: n/a              Progress Summary  Pt has attended 8 visits in [facility] deg at D3-5 for improved grasp of a glass. Will continue to upgrade goals PRN. Plan: Continue skilled Occupational Therapy 1-2 x/week or a total of 12 visits over a 90 day period.  Treatment will include: there ex, neuromuscular re-ed, MT, there act extension splint for night Squeeze/release theraball Gripper black level 1   2x 10 Digit isolation tasks.    Visualization tasks in digit and thumb aduction     Digit elevation/push on yellow foam        Match 4 for pinch/place      HEP: HEP upgrades in michele

## 2019-09-16 ENCOUNTER — OFFICE VISIT (OUTPATIENT)
Dept: OCCUPATIONAL MEDICINE | Age: 74
End: 2019-09-16
Attending: SPECIALIST
Payer: COMMERCIAL

## 2019-09-16 PROCEDURE — 97110 THERAPEUTIC EXERCISES: CPT

## 2019-09-16 NOTE — PROGRESS NOTES
Dx: ulnar neuropathy       Insurance (Authorized # of Visits):  25          Authorizing Physician: Dr. Rashawn Arreola  Next MD visit: none scheduled  Fall Risk: standard         Precautions: n/a              Progress Summary  Pt has attended 10 visits in Occupation for your referral. If you have any questions, please contact me at [phone]    Sincerely,  Electronically signed by therapist: ELVI Estrada     Physician's certification required:  No      Certification From: 8/00/6230  To:12/12/2019   Tony

## 2019-09-18 ENCOUNTER — TELEPHONE (OUTPATIENT)
Dept: PHYSICAL THERAPY | Facility: HOSPITAL | Age: 74
End: 2019-09-18

## 2019-09-18 NOTE — TELEPHONE ENCOUNTER
Called to f/u with patient after NC/NS to PT appt this date scheduled for 5:15. Patient thought was at 6:45 today, got confused and apologizes. Will call patient if appt opens for after 5:00 before his next scheduled appt.

## 2019-09-19 ENCOUNTER — OFFICE VISIT (OUTPATIENT)
Dept: OCCUPATIONAL MEDICINE | Age: 74
End: 2019-09-19
Attending: PHYSICIAN ASSISTANT
Payer: COMMERCIAL

## 2019-09-19 PROCEDURE — 97110 THERAPEUTIC EXERCISES: CPT

## 2019-09-19 PROCEDURE — 97112 NEUROMUSCULAR REEDUCATION: CPT

## 2019-09-25 ENCOUNTER — HOSPITAL ENCOUNTER (OUTPATIENT)
Dept: PHYSICAL THERAPY | Facility: HOSPITAL | Age: 74
Setting detail: THERAPIES SERIES
Discharge: HOME | End: 2019-09-25
Attending: SPECIALIST
Payer: COMMERCIAL

## 2019-09-25 PROCEDURE — 97110 THERAPEUTIC EXERCISES: CPT

## 2019-09-25 PROCEDURE — 97140 MANUAL THERAPY 1/> REGIONS: CPT

## 2019-09-25 NOTE — PROGRESS NOTES
Dx: ulnar neuropathy R elbow, cervical spondylosis with radiculopathy, R shoulder pain             Authorized # of Visits:  12         Next MD visit: none scheduled  Fall Risk: standard         Precautions: n/a           Subjective: Patient not seen last w independence in progressive HEP to prevent future injury within 8 weeks. - progress      Plan: Continue PT with progression as indicated, posterior shoulder strengthening.      Date: 8/13/2019  Tx#: 2/8 Date: 8/19/19  Tx#: 3/8 Date: 8/21/19  Tx#: 4/8 Date: shoulder flexion with hold in place x 8 - AAROM wall slides with towel x 10, x 5 (x 4 reps IND)  - sitting AAROM shoulder flexion with hold in place x 10 - AAROM wall slides with towel x 10, x 5 (x 4 reps IND)     Supine shoulder PROM, 5 min Supine shoulde rotation to L C2-5, 5 min Cervical PPIVM rotation to L C2-5, 3 min R shoulder posterior and inferior glides x 3 min R shoulder posterior and inferior glides x 3 min R shoulder posterior and inferior glides x 3 min R shoulder posterior and inferior glides,

## 2019-09-26 ENCOUNTER — OFFICE VISIT (OUTPATIENT)
Dept: OCCUPATIONAL MEDICINE | Age: 74
End: 2019-09-26
Attending: PHYSICIAN ASSISTANT
Payer: COMMERCIAL

## 2019-09-26 PROCEDURE — 97110 THERAPEUTIC EXERCISES: CPT

## 2019-09-26 PROCEDURE — 97112 NEUROMUSCULAR REEDUCATION: CPT

## 2019-09-26 NOTE — PROGRESS NOTES
Dx: ulnar neuropathy       Insurance (Authorized # of Visits):  25          Authorizing Physician: Dr. Emerson Bryan  Next MD visit: none scheduled  Fall Risk: standard         Precautions: n/a              Progress Summary  Pt has attended 12 visits in Occupation actively participate in planning and for this course of care.     Thank you for your referral. If you have any questions, please contact me at [phone]    Sincerely,  Electronically signed by therapist: TEE Bryant/CARIE     [de-identified] certific Digit elevation/push on yellow foam    Digit isolation  Using utensils   Match 4 for pinch/place         HEP: HEP upgrades in bold    Charges: 2 TE, 1 neuro  Total Timed Treatment: 45 min  Total Treatment Time: 45 min

## 2019-09-30 ENCOUNTER — HOSPITAL ENCOUNTER (OUTPATIENT)
Dept: PHYSICAL THERAPY | Facility: HOSPITAL | Age: 74
Setting detail: THERAPIES SERIES
Discharge: HOME | End: 2019-09-30
Attending: SPECIALIST
Payer: COMMERCIAL

## 2019-09-30 PROCEDURE — 97140 MANUAL THERAPY 1/> REGIONS: CPT

## 2019-09-30 PROCEDURE — 97110 THERAPEUTIC EXERCISES: CPT

## 2019-09-30 NOTE — PROGRESS NOTES
Dx: ulnar neuropathy R elbow, cervical spondylosis with radiculopathy, R shoulder pain             Authorized # of Visits:  12         Next MD visit: none scheduled  Fall Risk: standard         Precautions: n/a          Subjective: Patient reports that he to prevent future injury within 8 weeks. - progress      Plan: Continue PT with progression as indicated, posterior shoulder strengthening.      Date: 8/19/19  Tx#: 3/8 Date: 8/21/19  Tx#: 4/8 Date: 8/26/19  Tx#: 5/8 Date: 8/28/19  Tx#: 6/8 Date: 9/4/19  Tx AAROM wall slides with towel x 10, x 5 (x 4 reps IND)   Standing table push ups x 11 (to fatigue)   Supine shoulder PROM, 5 min Supine shoulder PROM, 5 min Supine shoulder PROM, 5 min Supine shoulder PROM, 5 min Supine shoulder PROM, 5 min Supine shoulder min R shoulder STM x 3 min - R shoulder ER and IR contract relax in tolerable range only   Cervical PPIVM rotation to L C2-5, 3 min R shoulder posterior and inferior glides x 3 min R shoulder posterior and inferior glides x 3 min R shoulder posterior and i

## 2019-10-01 ENCOUNTER — HOSPITAL ENCOUNTER (OUTPATIENT)
Dept: PHYSICAL THERAPY | Facility: HOSPITAL | Age: 74
Setting detail: THERAPIES SERIES
Discharge: HOME | End: 2019-10-01
Attending: SPECIALIST
Payer: COMMERCIAL

## 2019-10-01 DIAGNOSIS — M47.22 CERVICAL SPONDYLOSIS WITH RADICULOPATHY: ICD-10-CM

## 2019-10-01 DIAGNOSIS — G56.21 ULNAR NEUROPATHY AT ELBOW, RIGHT: ICD-10-CM

## 2019-10-01 PROCEDURE — 97140 MANUAL THERAPY 1/> REGIONS: CPT

## 2019-10-01 PROCEDURE — 97110 THERAPEUTIC EXERCISES: CPT

## 2019-10-02 NOTE — PROGRESS NOTES
Dx: ulnar neuropathy R elbow, cervical spondylosis with radiculopathy, R shoulder pain             Authorized # of Visits:  12         Next MD visit: none scheduled  Fall Risk: standard         Precautions: n/a          Subjective: Patient reports that his L4 x 4 min with manual assist to keep  on R UBE L4 x 4 min with manual assist to keep  on R Goals reassessment - -      Isometric ER vs wall, 3x10 Isometric ER vs wall, 3x10 YTB ER with manual assist at elbow 2 x 10 YTB ER with manual assist at elb 10  - SL AAROM abd to 100 deg, Ifeanyi to maintain neutral 2 x 10 - SL ER with towel roll, occasional AAROM 3 x 10  - SL AAROM abd to 100 deg, Ifeanyi to maintain neutral 2 x 10 - supine manual resisted serratus punch x 10 (fatigued)  - supine rhythmic stab at 9 min R shoulder posterior and inferior glides x 3 min R shoulder posterior and inferior glides x 3 min R shoulder posterior and inferior glides, varying degrees x 3 min R shoulder posterior and inferior glides, varying degrees x 3 min R shoulder posterior a

## 2019-10-03 ENCOUNTER — OFFICE VISIT (OUTPATIENT)
Dept: OCCUPATIONAL MEDICINE | Age: 74
End: 2019-10-03
Attending: SPECIALIST
Payer: COMMERCIAL

## 2019-10-03 PROCEDURE — 97110 THERAPEUTIC EXERCISES: CPT

## 2019-10-03 NOTE — PROGRESS NOTES
Dx: ulnar neuropathy       Insurance (Authorized # of Visits):  25          Authorizing Physician: Dr. Rashawn Arreola  Next MD visit: none scheduled  Fall Risk: standard         Precautions: n/a              Progress Summary  Pt has attended 13 visits in Occupation your referral. If you have any questions, please contact me at [phone]    Sincerely,  Electronically signed by therapist: TEE Barnes/CARIE     [de-identified] certification required:  No      Certification From: 0/80/9993  To:12/12/2019   Date: 8 Digit isolation tasks.    Visualization tasks in digit and thumb aduction Blokus for manipulation and pinch  Writing tasks    Digit elevation/push on yellow foam    Digit isolation  Using utensils    Match 4 for pinch/place          HEP: HEP upgrades in michele

## 2019-10-07 ENCOUNTER — HOSPITAL ENCOUNTER (OUTPATIENT)
Dept: PHYSICAL THERAPY | Facility: HOSPITAL | Age: 74
Setting detail: THERAPIES SERIES
Discharge: HOME | End: 2019-10-07
Attending: SPECIALIST
Payer: COMMERCIAL

## 2019-10-07 PROCEDURE — 97140 MANUAL THERAPY 1/> REGIONS: CPT

## 2019-10-07 PROCEDURE — 97110 THERAPEUTIC EXERCISES: CPT

## 2019-10-07 NOTE — PROGRESS NOTES
Dx: ulnar neuropathy R elbow, cervical spondylosis with radiculopathy, R shoulder pain             Authorized # of Visits:  12         Next MD visit: none scheduled  Fall Risk: standard         Precautions: n/a          Subjective: Patient again continues 9/16 Date: 9/30/19  Tx#: 10/16 10/1/19  Tx#: 11/16 10/7/19  Tx#: 12/16   UBE L3 x 4 min with manual assist to keep  on R UBE L3 x 4 min with manual assist to keep  on R UBE L4 x 4 min with manual assist to keep  on R UBE L4 x 4 min with manual shoulder PROM, 5 min Supine shoulder PROM, 5 min Supine shoulder PROM, 5 min Supine shoulder PROM, 5 min Supine shoulder PROM, 5 min   - SL ER with towel roll, occasional AAROM 3 x 10  - SL AAROM abd to 100 deg, Ifeanyi to maintain neutral 2 x 10 - supine man min R shoulder STM x 3 min R shoulder STM x 3 min - R shoulder ER and IR contract relax in tolerable range only Wall slides w/ Ifeanyi, 15 Wall slides w/ Ifeanyi, 15   R shoulder posterior and inferior glides x 3 min R shoulder posterior and inferior glides x 3

## 2019-10-09 ENCOUNTER — HOSPITAL ENCOUNTER (OUTPATIENT)
Dept: PHYSICAL THERAPY | Facility: HOSPITAL | Age: 74
Setting detail: THERAPIES SERIES
Discharge: HOME | End: 2019-10-09
Attending: SPECIALIST
Payer: COMMERCIAL

## 2019-10-09 PROCEDURE — 97140 MANUAL THERAPY 1/> REGIONS: CPT

## 2019-10-09 PROCEDURE — 97110 THERAPEUTIC EXERCISES: CPT

## 2019-10-09 NOTE — PROGRESS NOTES
Dx: ulnar neuropathy R elbow, cervical spondylosis with radiculopathy, R shoulder pain             Authorized # of Visits:  12         Next MD visit: none scheduled  Fall Risk: standard         Precautions: n/a          Subjective: Patient states that he i assist at elbow 3 x 10 RTB ER with manual assist at elbow 3 x 10 RTB ER with manual assist at elbow 3 x 10 -  Standing wt shifts at table with alt UE lift x 8 R/L Standing wt shifts at table with alt UE lift x 8 R/L   - Attempted Y/RTB NASH ER at [facility] stab at 90, 2 x 30s (fatigued)  - supine press, 2# x 20 - supine manual resisted serratus punch x 10 (fatigued)  - supine rhythmic stab at 90, 2 x 30s (fatigued)  - supine press, 3# x 10, x 7, x 3 - supine manual resisted serratus punch, 2 x 10  - supine r varying degrees x 3 min R shoulder posterior and inferior glides, varying degrees x 3 min R shoulder posterior and inferior glides, varying degrees x 3 min R shoulder posterior and inferior glides, varying degrees x 5 min R shoulder posterior and inferior

## 2019-10-10 ENCOUNTER — OFFICE VISIT (OUTPATIENT)
Dept: OCCUPATIONAL MEDICINE | Age: 74
End: 2019-10-10
Attending: SPECIALIST
Payer: COMMERCIAL

## 2019-10-10 PROCEDURE — 97110 THERAPEUTIC EXERCISES: CPT

## 2019-10-10 NOTE — PROGRESS NOTES
Dx: ulnar neuropathy       Insurance (Authorized # of Visits):  25          Authorizing Physician: Dr. Emerson Bryan  Next MD visit: none scheduled  Fall Risk: standard         Precautions: n/a              Progress Summary  Pt has attended 14 visits in Occupation [phone]    Sincerely,  Electronically signed by therapist: TEE Hernández/L     Physician's certification required:  No      Certification From: 3/43/2408  To:12/12/2019   Date: 8/5/19  Tx#: 6/16 8/8/19 7/16 8/12/19  Tx: 8/16 9/12/19 9/18 9 tasks  -open/close  -digit abd/add  -digit isolation Pegboard single peg Lateral pinch to pink egg Yellow digiflex full  x 20    Mental rehearsal of digit isolation on yellow digitflex   Gripper black level 1   2x 10 Digit isolation tasks.    Visualizat

## 2019-10-14 ENCOUNTER — HOSPITAL ENCOUNTER (OUTPATIENT)
Dept: PHYSICAL THERAPY | Facility: HOSPITAL | Age: 74
Setting detail: THERAPIES SERIES
Discharge: HOME | End: 2019-10-14
Attending: PHYSICIAN ASSISTANT
Payer: COMMERCIAL

## 2019-10-14 PROCEDURE — 97140 MANUAL THERAPY 1/> REGIONS: CPT

## 2019-10-14 PROCEDURE — 97110 THERAPEUTIC EXERCISES: CPT

## 2019-10-14 NOTE — PROGRESS NOTES
Dx: ulnar neuropathy R elbow, cervical spondylosis with radiculopathy, R shoulder pain             Authorized # of Visits:  12         Next MD visit: none scheduled  Fall Risk: standard         Precautions: n/a          Subjective: Patient states that he progression as indicated, posterior shoulder strengthening.      Date: 8/28/19  Tx#: 6/8 Date: 9/4/19  Tx#: 7/8 Date: 9/11/19  Tx#: 8/8 Date: 9/25/19  Tx#: 9/16 Date: 9/30/19  Tx#: 10/16 10/1/19  Tx#: 11/16 10/7/19  Tx#: 12/16 10/7/19  Tx#: 12/16 10/14/19 (to fatigue)  - standing wt shifts with alt UE lift x 10 R/L - standing table push ups x 15 (to fatigue)  - standing wt shifts with alt UE lift x 10 R/L   Supine shoulder PROM, 5 min Supine shoulder PROM, 5 min Supine shoulder PROM, 5 min Supine shoulder P other exercises), x 10 additional after rest Supine AROM flexion to end range, 1# x 10, 0# x 10 Supine AROM flexion to end range, 2# x 10 (Ifeanyi to hold due to  weakness), 1# x 10   R shoulder STM x 3 min R shoulder STM x 3 min R shoulder STM x 3 min -

## 2019-10-16 ENCOUNTER — HOSPITAL ENCOUNTER (OUTPATIENT)
Dept: PHYSICAL THERAPY | Facility: HOSPITAL | Age: 74
Setting detail: THERAPIES SERIES
Discharge: HOME | End: 2019-10-16
Attending: SPECIALIST
Payer: COMMERCIAL

## 2019-10-16 PROCEDURE — 97140 MANUAL THERAPY 1/> REGIONS: CPT

## 2019-10-16 PROCEDURE — 97110 THERAPEUTIC EXERCISES: CPT

## 2019-10-16 NOTE — PROGRESS NOTES
Dx: ulnar neuropathy R elbow, cervical spondylosis with radiculopathy, R shoulder pain             Authorized # of Visits:  12         Next MD visit: 11/21/19  Fall Risk: standard         Precautions: n/a          Subjective: Feels that shoulder mobility 10/7/19  Tx#: 12/16 10/7/19  Tx#: 12/16 10/14/19  Tx#: 13/16 10/16/19  Tx#: 14/16   Goals reassessment - -  - Seated BUE flexion stretch with SB x 15 Seated BUE flexion stretch with SB x 15 Seated BUE flexion stretch with SB x 15   RTB ER with manual gretchen 10  - supine rhythmic stab at 90, 2 x 30s (improving) - 2# ceiling punch, 2 x 10  - SL horiz abd AAROM (very quick to fatigue) x 6, x 5  - SL abd, 2 x 10 AAROM/pre-positioning as needed  - SL ER, 1# 2 x 5 (fatigued) - 1# ceiling punch x 15 (to fatigue)  - glides, varying degrees x 5 min R shoulder posterior and inferior glides, varying degrees x 5 min R shoulder posterior and inferior glides, varying degrees x 5 min R shoulder posterior and inferior glides, varying degrees x 5 min R shoulder posterior and i

## 2019-10-17 ENCOUNTER — OFFICE VISIT (OUTPATIENT)
Dept: OCCUPATIONAL MEDICINE | Age: 74
End: 2019-10-17
Attending: SPECIALIST
Payer: COMMERCIAL

## 2019-10-17 PROCEDURE — 97110 THERAPEUTIC EXERCISES: CPT

## 2019-10-17 PROCEDURE — 97014 ELECTRIC STIMULATION THERAPY: CPT

## 2019-10-18 NOTE — PROGRESS NOTES
Dx: ulnar neuropathy       Insurance (Authorized # of Visits):  25          Authorizing Physician: Dr. Lissa Albert  Next MD visit: none scheduled  Fall Risk: standard         Precautions: n/a              Progress Summary  Pt has attended 15 visits in Occupation questions, please contact me at [phone]    Sincerely,  Electronically signed by therapist: TEE Alexander/CARIE     [de-identified] certification required:  No      Certification From: 2/36/1379  To:12/12/2019 8/12/19  Tx: 8/16 9/12/19 9/18 9/16/1 isolation on yellow digitflex Digit isolation tasks    Visualization tasks in digit and thumb aduction Blokus for manipulation and pinch  Writing tasks  Digit abd/add on tabletop      Digit isolation  Using utensils                HEP: HEP upgrades in bold

## 2019-10-22 ENCOUNTER — HOSPITAL ENCOUNTER (OUTPATIENT)
Dept: PHYSICAL THERAPY | Facility: HOSPITAL | Age: 74
Setting detail: THERAPIES SERIES
Discharge: HOME | End: 2019-10-22
Attending: SPECIALIST
Payer: COMMERCIAL

## 2019-10-22 PROCEDURE — 97140 MANUAL THERAPY 1/> REGIONS: CPT

## 2019-10-22 PROCEDURE — 97110 THERAPEUTIC EXERCISES: CPT

## 2019-10-22 NOTE — PROGRESS NOTES
Dx: ulnar neuropathy R elbow, cervical spondylosis with radiculopathy, R shoulder pain             Authorized # of Visits:  12         Next MD visit: 11/21/19  Fall Risk: standard         Precautions: n/a          Subjective: Feels that shoulder mobility Standing wt shifts at table with alt UE lift x 8 R/L Standing wt shifts at table with alt UE lift x 8 R/L Y theratube ER, Ifeanyi R hand 2 x 15 (hard) Y theratube ER, Ifeanyi R hand 2 x 15 (hard) Y theratube ER, Ifeanyi R hand 2 x 15 (hard)   - GTB rows, 2 x 20  - AAROM/pre-positioning as needed  - SL ER, 1# 2 x 5 (fatigued) - 1# ceiling punch x 15 (to fatigue)  - SL ER, 1# x 9 (to fatigue, Ifeanyi at and to hold weight)  - SL abd, 0# x 9 (to fatigue, elbow bending to compensate) - 1# ceiling punch x 15 (to fatigue)  - posterior and inferior glides, varying degrees x 3 min R shoulder posterior and inferior glides, varying degrees x 5 min R shoulder posterior and inferior glides, varying degrees x 5 min R shoulder posterior and inferior glides, varying degrees x 5 min R s

## 2019-10-25 ENCOUNTER — TELEPHONE (OUTPATIENT)
Dept: SURGERY | Facility: CLINIC | Age: 74
End: 2019-10-25

## 2019-10-25 ENCOUNTER — OFFICE VISIT (OUTPATIENT)
Dept: OCCUPATIONAL MEDICINE | Age: 74
End: 2019-10-25
Attending: SPECIALIST
Payer: COMMERCIAL

## 2019-10-25 DIAGNOSIS — G56.21 ULNAR NEUROPATHY AT ELBOW, RIGHT: Primary | ICD-10-CM

## 2019-10-25 PROCEDURE — 97110 THERAPEUTIC EXERCISES: CPT

## 2019-10-25 NOTE — PROGRESS NOTES
Dx: ulnar neuropathy       Insurance (Authorized # of Visits):  25          Authorizing Physician: Dr. Dorothy Murphy  Next MD visit: none scheduled  Fall Risk: standard         Precautions: n/a              Progress Summary  Pt has attended 16 visits in Occupation re-ed, MT, there act. Mirror tx. Patient/Family/Caregiver was advised of these findings, precautions, and treatment options and has agreed to actively participate in planning and for this course of care.     Thank you for your referral. If you have an Blokus for tip pinch and place   Blokus for manipulation and pinch  Writing tasks  Digit abd/add on tabletop     Digit isolation  Using utensils                HEP: HEP upgrades in bold    Charges: 3 TE Total Timed Treatment: 40 min  Total Treatment Time:

## 2019-10-28 ENCOUNTER — HOSPITAL ENCOUNTER (OUTPATIENT)
Dept: PHYSICAL THERAPY | Facility: HOSPITAL | Age: 74
Setting detail: THERAPIES SERIES
Discharge: HOME | End: 2019-10-28
Attending: PHYSICIAN ASSISTANT
Payer: COMMERCIAL

## 2019-10-28 PROCEDURE — 97140 MANUAL THERAPY 1/> REGIONS: CPT

## 2019-10-28 PROCEDURE — 97110 THERAPEUTIC EXERCISES: CPT

## 2019-10-28 NOTE — PROGRESS NOTES
Progress Summary  Pt has attended 16 visits in Physical Therapy.      Dx: ulnar neuropathy R elbow, cervical spondylosis with radiculopathy, R shoulder pain             Authorized # of Visits:  16         Next MD visit: 11/21/19  Fall Risk: standard presentation. Weakness of this consideration does not clinically correlate with frozen shoulder only. Treating MD is aware of this and has stated that there is no current concern for cervical involvement.  Functional shoulder flexion is very limited by weak care.    X___________________________________________________ Date____________________    Certification From: 81/68/1366  To:1/26/2020       Date: 9/25/19  Tx#: 9/16 Date: 9/30/19  Tx#: 10/16 10/1/19  Tx#: 11/16 10/7/19  Tx#: 12/16 10/7/19  Tx#: 13/16 10/1 shifts with alt UE lift x 10 R/L G theratube NASH extension with Ifeanyi R for  x 15 (fatigued)  Goals reassessment as needed and listed   Supine shoulder PROM, 5 min Supine shoulder PROM, 5 min Supine shoulder PROM, 5 min Supine shoulder PROM, 5 min Supin hold due to  weakness), 1# x 10 Supine manually resisted flexion to 90 deg, 15    Perturbations in 90 deg flexion, 2x30 sec Supine manually resisted flexion to 90 deg, 15    Perturbations in 90 deg flexion, 2x30 sec   - R shoulder ER and IR contract re

## 2019-10-30 ENCOUNTER — HOSPITAL ENCOUNTER (OUTPATIENT)
Dept: PHYSICAL THERAPY | Facility: HOSPITAL | Age: 74
Setting detail: THERAPIES SERIES
Discharge: HOME | End: 2019-10-30
Attending: PHYSICIAN ASSISTANT
Payer: COMMERCIAL

## 2019-10-30 PROCEDURE — 97140 MANUAL THERAPY 1/> REGIONS: CPT

## 2019-10-30 PROCEDURE — 97110 THERAPEUTIC EXERCISES: CPT

## 2019-10-30 NOTE — PROGRESS NOTES
Dx: ulnar neuropathy R elbow, cervical spondylosis with radiculopathy, R shoulder pain             Authorized # of Visits:  16         Next MD visit: 11/21/19  Fall Risk: standard         Precautions: n/a          Subjective: Patient reports that he is fee progressive HEP to prevent future injury within 8 weeks. - progress      Plan: Continue PT with resisted shoulder strengthening as tolerated.      Date: 9/30/19  Tx#: 10/16 10/1/19  Tx#: 11/16 10/7/19  Tx#: 12/16 10/7/19  Tx#: 13/16 10/14/19  Tx#: 14/16 10/ shifts with alt UE lift x 10 R/L G theratube NASH extension with Ifeanyi R for  x 15 (fatigued)  Goals reassessment as needed and listed RUE on wall at 70-90 deg leaning in with manual perturbations 3 x 30s   Supine shoulder PROM, 5 min Supine shoulder PRO manually resisted flexion to 90 deg, 15    Perturbations in 90 deg flexion, 2x30 sec Supine manually resisted flexion to 90 deg, 15    Perturbations in 90 deg flexion, 2x30 sec - supine YTB resisted ceiling punch x 6  - AAROM to 90 deg flexion in punch, ec

## 2019-11-04 ENCOUNTER — OFFICE VISIT (OUTPATIENT)
Dept: OCCUPATIONAL MEDICINE | Age: 74
End: 2019-11-04
Attending: SPECIALIST
Payer: COMMERCIAL

## 2019-11-04 PROCEDURE — 97110 THERAPEUTIC EXERCISES: CPT

## 2019-11-04 PROCEDURE — 97760 ORTHOTIC MGMT&TRAING 1ST ENC: CPT

## 2019-11-04 NOTE — PROGRESS NOTES
Dx: ulnar neuropathy       Insurance (Authorized # of Visits):  25          Authorizing Physician: Dr. Esther Rios  Next MD visit: none scheduled  Fall Risk: standard         Precautions: n/a              Progress Summary  Pt has attended 17 visits in Occupation advised of these findings, precautions, and treatment options and has agreed to actively participate in planning and for this course of care.     Thank you for your referral. If you have any questions, please contact me at [phone]    Sincerely,  Electr

## 2019-11-06 ENCOUNTER — HOSPITAL ENCOUNTER (OUTPATIENT)
Dept: PHYSICAL THERAPY | Facility: HOSPITAL | Age: 74
Setting detail: THERAPIES SERIES
Discharge: HOME | End: 2019-11-06
Attending: PHYSICIAN ASSISTANT
Payer: COMMERCIAL

## 2019-11-06 PROCEDURE — 97140 MANUAL THERAPY 1/> REGIONS: CPT

## 2019-11-06 PROCEDURE — 97110 THERAPEUTIC EXERCISES: CPT

## 2019-11-07 NOTE — PROGRESS NOTES
Dx: ulnar neuropathy R elbow, cervical spondylosis with radiculopathy, R shoulder pain             Authorized # of Visits:  16         Next MD visit: 11/21/19  Fall Risk: standard         Precautions: n/a          Subjective: Patient reports has been tryin flexion stretch with SB x 15    W/ manual perturbations, 1' Seated BUE flexion stretch with SB x 15    W/ manual perturbations, 1' Seated BUE flexion stretch with SB x 15    W/ manual perturbations 3 x 30s Seated BUE flexion stretch with SB x 15    W/ manu x 5 min Supine shoulder PROM x 5 min Supine shoulder PROM x 5 min Supine shoulder PROM x 5 min Supine shoulder PROM x 5 min Supine shoulder PROM x 5 min   - 1# ceiling punch x 15 (to fatigue)  - SL ER, 1# x 11 (to fatigue, Ifeanyi at and to hold weight) - 1# therapist pulling on YTB x 7   Wall slides w/ Ifeanyi, 15 Wall slides w/ Ifeanyi, 15 Wall slides w/ Ifeanyi, 15 Wall slides w/ Ifeanyi, 15 Wall slides w/ Ifeanyi, 15 Wall slides w/ no assist to Ifeanyi, 2x15 Wall slides w/ no assist to Ifeanyi, 2x15 Wall slides w/ no assist to

## 2019-11-12 ENCOUNTER — OFFICE VISIT (OUTPATIENT)
Dept: OCCUPATIONAL MEDICINE | Age: 74
End: 2019-11-12
Attending: SPECIALIST
Payer: COMMERCIAL

## 2019-11-12 PROCEDURE — 97110 THERAPEUTIC EXERCISES: CPT

## 2019-11-12 PROCEDURE — 97760 ORTHOTIC MGMT&TRAING 1ST ENC: CPT

## 2019-11-12 NOTE — PROGRESS NOTES
Dx: ulnar neuropathy       Insurance (Authorized # of Visits):  25          Authorizing Physician: Dr. Eva Feliciano  Next MD visit: none scheduled  Fall Risk: standard         Precautions: n/a              Discharge Summary  Pt has attended 18 visits in [facility] to upgrade goals PRN. Plan: D/C pt to HEP only at this time.  Pt encouraged to obtain order for re-evaluation in 2-3 months as nerve regeneration occurs     Patient/Family/Caregiver was advised of these findings, precautions, and treatment options and digiflex full  x 20    Mental rehearsal of digit isolation on yellow digitflex Digit isolation tasks Blokus for tip pinch and place     Writing tasks  Digit abd/add on tabletop       Using utensils                  HEP: HEP upgrades in bold    Charges:

## 2019-11-15 ENCOUNTER — TELEPHONE (OUTPATIENT)
Dept: SURGERY | Facility: CLINIC | Age: 74
End: 2019-11-15

## 2019-11-15 NOTE — TELEPHONE ENCOUNTER
Called patient on behalf of Dr. Malissa Almonte to offer earlier appointment.   Patient accepted 11/19/19 at 4:00pm

## 2019-11-18 ENCOUNTER — APPOINTMENT (OUTPATIENT)
Dept: OCCUPATIONAL MEDICINE | Age: 74
End: 2019-11-18
Payer: COMMERCIAL

## 2019-11-19 ENCOUNTER — OFFICE VISIT (OUTPATIENT)
Dept: SURGERY | Facility: CLINIC | Age: 74
End: 2019-11-19

## 2019-11-19 ENCOUNTER — HOSPITAL ENCOUNTER (OUTPATIENT)
Dept: PHYSICAL THERAPY | Facility: HOSPITAL | Age: 74
Setting detail: THERAPIES SERIES
Discharge: HOME | End: 2019-11-19
Attending: PHYSICIAN ASSISTANT
Payer: COMMERCIAL

## 2019-11-19 VITALS — DIASTOLIC BLOOD PRESSURE: 70 MMHG | SYSTOLIC BLOOD PRESSURE: 118 MMHG | HEART RATE: 78 BPM

## 2019-11-19 DIAGNOSIS — G56.21 ULNAR NEUROPATHY AT ELBOW, RIGHT: Primary | ICD-10-CM

## 2019-11-19 PROCEDURE — 97110 THERAPEUTIC EXERCISES: CPT

## 2019-11-19 PROCEDURE — 99214 OFFICE O/P EST MOD 30 MIN: CPT | Performed by: NEUROLOGICAL SURGERY

## 2019-11-19 PROCEDURE — 97140 MANUAL THERAPY 1/> REGIONS: CPT

## 2019-11-19 NOTE — PROGRESS NOTES
Pt is here for follow up for revaluation     PT: 1 more secession left. OT: Last secession was last Thursday. Pt states that he feels like PT and OT hasnt seen much improvement Per Pt. Per Pt he would like another referral for OT and PT.      Pt s

## 2019-11-19 NOTE — PROGRESS NOTES
Neurosurgery Clinic Visit  2019    Tariq Sharma PCP:  Armando Kapoor MD    1945 MRN [id number]     HISTORY OF PRESENT ILLNESS:  Cliff Robles is a(n) 69year old male here for follow-up  He finished up occupational therapy and is

## 2019-11-20 ENCOUNTER — APPOINTMENT (OUTPATIENT)
Dept: PHYSICAL THERAPY | Facility: HOSPITAL | Age: 74
End: 2019-11-20
Payer: COMMERCIAL

## 2019-11-26 ENCOUNTER — APPOINTMENT (OUTPATIENT)
Dept: PHYSICAL THERAPY | Facility: HOSPITAL | Age: 74
End: 2019-11-26
Attending: PHYSICIAN ASSISTANT
Payer: COMMERCIAL

## 2019-11-26 ENCOUNTER — TELEPHONE (OUTPATIENT)
Dept: SURGERY | Facility: CLINIC | Age: 74
End: 2019-11-26

## 2019-11-26 ENCOUNTER — HOSPITAL ENCOUNTER (OUTPATIENT)
Dept: PHYSICAL THERAPY | Facility: HOSPITAL | Age: 74
Setting detail: THERAPIES SERIES
Discharge: HOME | End: 2019-11-26
Attending: SPECIALIST
Payer: COMMERCIAL

## 2019-11-26 PROCEDURE — 97110 THERAPEUTIC EXERCISES: CPT

## 2019-11-26 PROCEDURE — 97140 MANUAL THERAPY 1/> REGIONS: CPT

## 2019-11-26 NOTE — PROGRESS NOTES
Discharge Summary  Pt has attended 21 visits in Physical Therapy.      Dx: ulnar neuropathy R elbow, cervical spondylosis with radiculopathy, R shoulder pain             Authorized # of Visits:  24 per HMO        Next MD visit: 12/4/19  Fall Risk: standard consistent with frozen shoulder as PROM is significantly greater than AROM without pain until end range, whole arm weakness also does not clinically correlate with frozen shoulder. Discussed findings and plateau.  Advised to continue with HEP and continue t with SB x 15    W/ manual perturbations 3 x 30s Goals reassessment as needed and listed   Standing wt shifts at table with alt UE lift x 8 R/L Y theratube ER, Ifeanyi R hand 2 x 15 (hard) Y theratube ER, Ifeanyi R hand 2 x 15 (hard) Y theratube ER, Ifeanyi R hand 2 min Supine shoulder PROM x 5 min   - 1# ceiling punch x 10 (to fatigue)  - supine supported ER with 1# x 10 (hard) - 1# ceiling punch x 15 (to fatigue)  - supine supported ER with 1# x 10 (hard) - 1# ceiling punch x 15 (to fatigue)  - SL ER, 1# with Ifeanyi f punch, eccentric hold in place, therapist pulling on YTB x 7   Wall slides w/ Ifeanyi, 15 Wall slides w/ Ifeanyi, 15 Wall slides w/ Ifeanyi, 15 Wall slides w/ no assist to Ifeanyi, 2x15 Wall slides w/ no assist to Ifeanyi, 2x15 Wall slides w/ no assist to Ifeanyi, 2x15 - -

## 2019-11-26 NOTE — TELEPHONE ENCOUNTER
Pt presented to office stating his PCP's office is unable to \"pull up Dr. Princess Obrien in Epic to create a referral\". Pt will see Dr. Eula Strange on 12/04. This plan created by Hannah Engle.  Pt requested referral from PCP to see Dr. Princess Obrien after his

## 2019-12-04 ENCOUNTER — OFFICE VISIT (OUTPATIENT)
Dept: SURGERY | Facility: CLINIC | Age: 74
End: 2019-12-04

## 2019-12-04 VITALS — SYSTOLIC BLOOD PRESSURE: 126 MMHG | DIASTOLIC BLOOD PRESSURE: 80 MMHG | HEART RATE: 72 BPM

## 2019-12-04 DIAGNOSIS — R29.898 RIGHT ARM WEAKNESS: Primary | ICD-10-CM

## 2019-12-04 PROCEDURE — 99214 OFFICE O/P EST MOD 30 MIN: CPT | Performed by: NEUROLOGICAL SURGERY

## 2019-12-04 NOTE — PROGRESS NOTES
Pt is here for follow up. Pt is an established Pt of Dr Melissa Howard. Pt is here for a second opinion for the neck. Pt states that he is still the same since LOV. No new symptome's to be reported.

## 2019-12-04 NOTE — PROGRESS NOTES
[facility]  Neurosurgery Clinic Visit      HISTORY OF PRESENT ILLNESS:  Mariely Rose FAITH Evans is a(n) 69year old male who is a patient of Dr. Walt Zayas, here for a 2nd opinion.  His symptoms started approximately 1 year before seeing  • OTHER      kidney stone extraction   • OTHER      ingrown toe nail right big toe   • OTHER      multiple dental surgeries   • OTHER  06/20/2018    RIGHT CARPAL TUNNEL RELEASE AND ULNAR DECOMPRESSION   • OTHER      RIGHT ULNAR NERVE TRANSPOSITION   • UL IMAGING:  MRI 12/18 reviewed. There is mild bilateral foraminal stenosis at C5-6. There is moderate to severe foraminal stenosis at C6-7.     1/4/19 EMG  With a diffuse sensorimotor polyneuropathy.  Likely bilateral median neuropathy at the wrists, R>L

## 2019-12-11 ENCOUNTER — APPOINTMENT (OUTPATIENT)
Dept: PHYSICAL THERAPY | Facility: HOSPITAL | Age: 74
End: 2019-12-11
Attending: PHYSICIAN ASSISTANT
Payer: COMMERCIAL

## 2019-12-18 ENCOUNTER — HOSPITAL ENCOUNTER (OUTPATIENT)
Dept: MRI IMAGING | Facility: HOSPITAL | Age: 74
Discharge: HOME | End: 2019-12-18
Attending: NEUROLOGICAL SURGERY
Payer: COMMERCIAL

## 2019-12-18 DIAGNOSIS — R29.898 RIGHT ARM WEAKNESS: ICD-10-CM

## 2019-12-18 PROCEDURE — 72141 MRI NECK SPINE W/O DYE: CPT | Performed by: NEUROLOGICAL SURGERY

## 2020-01-01 ENCOUNTER — EXTERNAL RECORD (OUTPATIENT)
Dept: HEALTH INFORMATION MANAGEMENT | Facility: OTHER | Age: 75
End: 2020-01-01

## 2020-01-02 ENCOUNTER — TELEPHONE (OUTPATIENT)
Dept: SURGERY | Facility: CLINIC | Age: 75
End: 2020-01-02

## 2020-01-02 DIAGNOSIS — R29.898 RIGHT ARM WEAKNESS: Primary | ICD-10-CM

## 2020-01-02 NOTE — TELEPHONE ENCOUNTER
Called and spoke with patient    Would like to go ahead with left carpal and cubital tunnel release  Order placed    Please call patient and schedule

## 2020-01-02 NOTE — TELEPHONE ENCOUNTER
Patient has to schedule 2 week post op appointment with Dr. Faraz Ugalde when he calls back to have surgical discussion

## 2020-01-03 NOTE — TELEPHONE ENCOUNTER
Is there a reason we are waiting for patient to call back to set up surgical discussion? Not many slots left in Dr. Salma Bray schedule.

## 2020-01-03 NOTE — TELEPHONE ENCOUNTER
When I called him, he was driving and said he would call me back to discuss instructions.   You can definitely reach out to patient for the post-ops, thank you so much

## 2020-01-07 NOTE — PROGRESS NOTES
ASPIRA DRAINAGE CATHETER        -A patient information folder has been provided for you. This includes procedure for draining fluid, dressing instructions and information to monitor for any signs or symptoms of infection.    -There is also a DVD provided and a step by step reference guide for the care of your Aspira drain.    For any questions or concerns call Interventional Radiology @ [phone]                    ASPIRA DRAINAGE CATHETER        -A patient information folder has been provided for you. This includes procedure for draining fluid, dressing instructions and information to monitor for any signs or symptoms of infection.    -There is also a DVD provided and a step by step reference guide for the care of your Aspira drain.    For any questions or concerns call Interventional Radiology @ [phone]   Dx: ulnar neuropathy R elbow, cervical spondylosis with radiculopathy, R shoulder pain             Authorized # of Visits:  24 per HMO        Next MD visit: 11/21/19  Fall Risk: standard         Precautions: n/a          Subjective: Patient apologizes for progress  Patient will demonstrate independence in progressive HEP to prevent future injury within 8 weeks. - progress      Plan: Continue PT with resisted shoulder strengthening as tolerated. Continue 1 additional visit next week.  Likely to hold or D/C at assisting holding UE in place x 10, x 5 Wall push ups, assisting holding UE in place x 10, x 5   Standing table push ups x 15 (to fatigue) - standing table push ups x 15 (to fatigue)  - standing wt shifts with alt UE lift x 10 R/L - standing table push ups (fatigued - thinks was from previous sets other exercises), x 10 additional after rest Supine AROM flexion to end range, 1# x 10, 0# x 10 Supine AROM flexion to end range, 2# x 10 (Ifeanyi to hold due to  weakness), 1# x 10 Supine AROM flexion to end rang

## 2020-01-09 ENCOUNTER — OFFICE VISIT (OUTPATIENT)
Dept: ELECTROPHYSIOLOGY | Facility: HOSPITAL | Age: 75
End: 2020-01-09
Attending: NEUROLOGICAL SURGERY
Payer: COMMERCIAL

## 2020-01-09 ENCOUNTER — PATIENT MESSAGE (OUTPATIENT)
Dept: SURGERY | Facility: CLINIC | Age: 75
End: 2020-01-09

## 2020-01-09 DIAGNOSIS — G56.22 ULNAR NEUROPATHY OF LEFT UPPER EXTREMITY: Primary | ICD-10-CM

## 2020-01-09 DIAGNOSIS — R29.898 RIGHT ARM WEAKNESS: ICD-10-CM

## 2020-01-09 DIAGNOSIS — G56.03 BILATERAL CARPAL TUNNEL SYNDROME: ICD-10-CM

## 2020-01-09 DIAGNOSIS — G62.9 NEUROPATHY: Primary | ICD-10-CM

## 2020-01-09 PROCEDURE — 95910 NRV CNDJ TEST 7-8 STUDIES: CPT | Performed by: OTHER

## 2020-01-09 PROCEDURE — 95886 MUSC TEST DONE W/N TEST COMP: CPT | Performed by: OTHER

## 2020-01-09 NOTE — TELEPHONE ENCOUNTER
Pt presented in office requesting to change sx date from 02/03 to 02/05 if possible, please call back

## 2020-01-09 NOTE — TELEPHONE ENCOUNTER
Spoke with patient who confirmed that he agrees to new surgery date of 2/5/20. Patient stated that he would like [employer] message with new date sent to him. Patient stated that he was grateful for the information and conversation.

## 2020-01-09 NOTE — TELEPHONE ENCOUNTER
LVMTCB    Patient able to move surgery date to 02/05 if he wants.   Please let nursing know of his decision so we can make changes

## 2020-01-09 NOTE — TELEPHONE ENCOUNTER
[employer] message sent to patient with surgical instructions, information, and new surgery date of 2/5/20.

## 2020-01-10 NOTE — PROCEDURES
[address]  [facility], [address] [address]      PATIENT'S NAME: Yevgeniy CUEVAS   REFERRING PHYSICIAN: Sudha Felder.  Damaso Carter MD   PATIENT ACCOUNT #: [de-identified] LOCATION: [facility]   MEDICAL RECORD #: [id number] DATE OF BIRTH: [birth year] predominantly axonal in nature, associated with demyelinating component. There is worsening compared to last study  in  most of nerves tested.   There is also worsening superimposed bilateral cervical radiculopathy given the diffuse neurogenic changes in t

## 2020-01-13 NOTE — TELEPHONE ENCOUNTER
Created [facility] DIONTE Referral for Ulnar Nerve Decompression (51612)   Uploaded Clinicals   Referral #:[id number]  Case Pending

## 2020-01-13 NOTE — TELEPHONE ENCOUNTER
Prior authorization request completed for: Ulnar Nerve Decompression   Authorization #[id number]     Authorization dates: 01/02/20 - 04/01/20  CPT codes approved: 79984  Number of visits/dates of service approved: 1  Physician: Alex Parker     Patient i

## 2020-01-15 ENCOUNTER — LAB ENCOUNTER (OUTPATIENT)
Dept: LAB | Age: 75
End: 2020-01-15
Attending: SPECIALIST
Payer: COMMERCIAL

## 2020-01-15 DIAGNOSIS — D64.9 ANEMIA: ICD-10-CM

## 2020-01-15 DIAGNOSIS — R29.898 RIGHT ARM WEAKNESS: ICD-10-CM

## 2020-01-15 DIAGNOSIS — E78.5 DYSLIPIDEMIA: ICD-10-CM

## 2020-01-15 DIAGNOSIS — E03.9 HYPOTHYROIDISM: ICD-10-CM

## 2020-01-15 DIAGNOSIS — E11.9 DM2 (DIABETES MELLITUS, TYPE 2) (HCC): Primary | ICD-10-CM

## 2020-01-15 LAB
ALBUMIN SERPL-MCNC: 3.3 G/DL (ref 3.4–5)
ALBUMIN/GLOB SERPL: 0.7 {RATIO} (ref 1–2)
ALP LIVER SERPL-CCNC: 70 U/L (ref 45–117)
ALT SERPL-CCNC: 24 U/L (ref 16–61)
ANION GAP SERPL CALC-SCNC: 6 MMOL/L (ref 0–18)
APTT PPP: 30.3 SECONDS (ref 25.4–36.1)
AST SERPL-CCNC: 19 U/L (ref 15–37)
BASOPHILS # BLD AUTO: 0.05 X10(3) UL (ref 0–0.2)
BASOPHILS NFR BLD AUTO: 0.5 %
BILIRUB SERPL-MCNC: 0.7 MG/DL (ref 0.1–2)
BUN BLD-MCNC: 20 MG/DL (ref 7–18)
BUN/CREAT SERPL: 25.3 (ref 10–20)
CALCIUM BLD-MCNC: 9 MG/DL (ref 8.5–10.1)
CHLORIDE SERPL-SCNC: 107 MMOL/L (ref 98–112)
CHOLEST SMN-MCNC: 129 MG/DL (ref ?–200)
CO2 SERPL-SCNC: 27 MMOL/L (ref 21–32)
CREAT BLD-MCNC: 0.79 MG/DL (ref 0.7–1.3)
CREAT UR-SCNC: 101 MG/DL
DEPRECATED RDW RBC AUTO: 45.4 FL (ref 35.1–46.3)
EOSINOPHIL # BLD AUTO: 0.2 X10(3) UL (ref 0–0.7)
EOSINOPHIL NFR BLD AUTO: 2.1 %
ERYTHROCYTE [DISTWIDTH] IN BLOOD BY AUTOMATED COUNT: 13.4 % (ref 11–15)
EST. AVERAGE GLUCOSE BLD GHB EST-MCNC: 143 MG/DL (ref 68–126)
GLOBULIN PLAS-MCNC: 4.6 G/DL (ref 2.8–4.4)
GLUCOSE BLD-MCNC: 82 MG/DL (ref 70–99)
HBA1C MFR BLD HPLC: 6.6 % (ref ?–5.7)
HCT VFR BLD AUTO: 48.3 % (ref 39–53)
HDLC SERPL-MCNC: 45 MG/DL (ref 40–59)
HGB BLD-MCNC: 15.2 G/DL (ref 13–17.5)
IMM GRANULOCYTES # BLD AUTO: 0.05 X10(3) UL (ref 0–1)
IMM GRANULOCYTES NFR BLD: 0.5 %
INR BLD: 0.93 (ref 0.9–1.1)
LDLC SERPL CALC-MCNC: 65 MG/DL (ref ?–100)
LYMPHOCYTES # BLD AUTO: 2.5 X10(3) UL (ref 1–4)
LYMPHOCYTES NFR BLD AUTO: 26.6 %
M PROTEIN MFR SERPL ELPH: 7.9 G/DL (ref 6.4–8.2)
MCH RBC QN AUTO: 29.2 PG (ref 26–34)
MCHC RBC AUTO-ENTMCNC: 31.5 G/DL (ref 31–37)
MCV RBC AUTO: 92.9 FL (ref 80–100)
MICROALBUMIN UR-MCNC: 88.1 MG/DL
MICROALBUMIN/CREAT 24H UR-RTO: 872.3 UG/MG (ref ?–30)
MONOCYTES # BLD AUTO: 0.63 X10(3) UL (ref 0.1–1)
MONOCYTES NFR BLD AUTO: 6.7 %
NEUTROPHILS # BLD AUTO: 5.97 X10 (3) UL (ref 1.5–7.7)
NEUTROPHILS # BLD AUTO: 5.97 X10(3) UL (ref 1.5–7.7)
NEUTROPHILS NFR BLD AUTO: 63.6 %
NONHDLC SERPL-MCNC: 84 MG/DL (ref ?–130)
OSMOLALITY SERPL CALC.SUM OF ELEC: 292 MOSM/KG (ref 275–295)
PATIENT FASTING Y/N/NP: YES
PATIENT FASTING Y/N/NP: YES
PLATELET # BLD AUTO: 257 10(3)UL (ref 150–450)
POTASSIUM SERPL-SCNC: 4.1 MMOL/L (ref 3.5–5.1)
PSA SERPL DL<=0.01 NG/ML-MCNC: 12.8 SECONDS (ref 12.5–14.7)
RBC # BLD AUTO: 5.2 X10(6)UL (ref 3.8–5.8)
SODIUM SERPL-SCNC: 140 MMOL/L (ref 136–145)
T4 FREE SERPL-MCNC: 1.1 NG/DL (ref 0.8–1.7)
TRIGL SERPL-MCNC: 95 MG/DL (ref 30–149)
TSI SER-ACNC: 2.33 MIU/ML (ref 0.36–3.74)
VLDLC SERPL CALC-MCNC: 19 MG/DL (ref 0–30)
WBC # BLD AUTO: 9.4 X10(3) UL (ref 4–11)

## 2020-01-15 PROCEDURE — 85025 COMPLETE CBC W/AUTO DIFF WBC: CPT

## 2020-01-15 PROCEDURE — 82570 ASSAY OF URINE CREATININE: CPT

## 2020-01-15 PROCEDURE — 82043 UR ALBUMIN QUANTITATIVE: CPT

## 2020-01-15 PROCEDURE — 36415 COLL VENOUS BLD VENIPUNCTURE: CPT

## 2020-01-15 PROCEDURE — 83036 HEMOGLOBIN GLYCOSYLATED A1C: CPT

## 2020-01-15 PROCEDURE — 84439 ASSAY OF FREE THYROXINE: CPT

## 2020-01-15 PROCEDURE — 80053 COMPREHEN METABOLIC PANEL: CPT

## 2020-01-15 PROCEDURE — 80061 LIPID PANEL: CPT

## 2020-01-15 PROCEDURE — 84443 ASSAY THYROID STIM HORMONE: CPT

## 2020-01-15 PROCEDURE — 85610 PROTHROMBIN TIME: CPT

## 2020-01-15 PROCEDURE — 85730 THROMBOPLASTIN TIME PARTIAL: CPT

## 2020-01-22 ENCOUNTER — OFFICE VISIT (OUTPATIENT)
Dept: SURGERY | Facility: CLINIC | Age: 75
End: 2020-01-22

## 2020-01-22 VITALS — HEART RATE: 72 BPM | DIASTOLIC BLOOD PRESSURE: 82 MMHG | SYSTOLIC BLOOD PRESSURE: 118 MMHG

## 2020-01-22 DIAGNOSIS — R29.898 RIGHT ARM WEAKNESS: Primary | ICD-10-CM

## 2020-01-22 DIAGNOSIS — G56.21 ULNAR NEUROPATHY AT ELBOW, RIGHT: ICD-10-CM

## 2020-01-22 PROCEDURE — 99213 OFFICE O/P EST LOW 20 MIN: CPT | Performed by: NEUROLOGICAL SURGERY

## 2020-01-22 RX ORDER — TORSEMIDE 10 MG/1
1 TABLET ORAL DAILY PRN
Status: ON HOLD | COMMUNITY
Start: 2020-01-02 | End: 2020-10-19 | Stop reason: CLARIF

## 2020-01-22 NOTE — PROGRESS NOTES
[facility]  Neurosurgery Clinic Visit      HISTORY OF PRESENT ILLNESS:  Corey Burrell is a(n) 69year old male returns to clinic to discuss MRI, EMG. He is overall stable since his last visit.  He feels limited with his right hand for this visit. REVIEW OF SYSTEMS:  A 10-point system was reviewed. Pertinent positives and negatives are noted in HPI. PHYSICAL EXAMINATION:  VITAL SIGNS: /82   Pulse 72   GENERAL:  Patient is a 69year old male in no acute distress.   NE MD  Neurological Surgeon  [facility]  [address], [address], [address] [facility]

## 2020-01-22 NOTE — PROGRESS NOTES
Pt is here for follow up to review imaging     MRI of the cervical: Obtained    EMG: Obtained     Pt has a upcoming procedure with Dr Alfonso Brian:     Renella Bence, POSSIBLE TRANSPOSITION, RIGHT CARPAL TUNNEL RELEASE AND ALL INDICATED Kaylin Hernandez

## 2020-01-30 ENCOUNTER — OFFICE VISIT (OUTPATIENT)
Dept: SURGERY | Facility: CLINIC | Age: 75
End: 2020-01-30

## 2020-01-30 VITALS — DIASTOLIC BLOOD PRESSURE: 76 MMHG | SYSTOLIC BLOOD PRESSURE: 140 MMHG | HEART RATE: 84 BPM

## 2020-01-30 DIAGNOSIS — G56.22 ULNAR NEUROPATHY OF LEFT UPPER EXTREMITY: ICD-10-CM

## 2020-01-30 DIAGNOSIS — G56.21 ULNAR NEUROPATHY AT ELBOW, RIGHT: ICD-10-CM

## 2020-01-30 DIAGNOSIS — R29.898 RIGHT ARM WEAKNESS: Primary | ICD-10-CM

## 2020-01-30 DIAGNOSIS — M47.22 CERVICAL SPONDYLOSIS WITH RADICULOPATHY: ICD-10-CM

## 2020-01-30 DIAGNOSIS — G56.03 CARPAL TUNNEL SYNDROME, BILATERAL UPPER LIMBS: ICD-10-CM

## 2020-01-30 PROCEDURE — 99213 OFFICE O/P EST LOW 20 MIN: CPT | Performed by: PHYSICIAN ASSISTANT

## 2020-01-30 NOTE — H&P
Neurosurgery Clinic Visit  2020    Thais Falcon PCP:  Wilbur Cervantes MD    1945 MRN [id number]     HISTORY OF PRESENT ILLNESS:  Thais Falcon is a(n) 69year old male who is here for follow-up for bilateral hand weakness.   He denie diabetic neuropathy in regards to his recovery. His glucose levels are well controlled. We discussed having neurology control neuropathy symptoms with different medications.   Will proceed with left ulnar nerve decompression and left carpal tunnel release

## 2020-01-30 NOTE — H&P (VIEW-ONLY)
Neurosurgery Clinic Visit  2020    Gauri Holland PCP:  Elena Iyer MD    1945 MRN [id number]     HISTORY OF PRESENT ILLNESS:  Gauri Holland is a(n) 69year old male who is here for follow-up for bilateral hand weakness.   He denie diabetic neuropathy in regards to his recovery. His glucose levels are well controlled. We discussed having neurology control neuropathy symptoms with different medications.   Will proceed with left ulnar nerve decompression and left carpal tunnel release

## 2020-01-30 NOTE — PROGRESS NOTES
Neurosurgery Clinic Visit  2020    Gauri Holland PCP:  Elena Iyer MD    1945 MRN [id number]     HISTORY OF PRESENT ILLNESS:  Gauri Holland is a(n) 69year old male who is here for follow-up for bilateral hand weakness.   He denie diabetic neuropathy in regards to his recovery. His glucose levels are well controlled. We discussed having neurology control neuropathy symptoms with different medications.   Will proceed with left ulnar nerve decompression and left carpal tunnel release

## 2020-02-04 NOTE — TELEPHONE ENCOUNTER
Called patient to inform that due to his illness and being on antibiotic, his surgery is being rescheduled to next Wednesday 02/11. Patient appreciative for the phone call.

## 2020-02-07 ENCOUNTER — HOSPITAL ENCOUNTER (EMERGENCY)
Facility: HOSPITAL | Age: 75
Discharge: HOME | End: 2020-02-07
Attending: EMERGENCY MEDICINE
Payer: COMMERCIAL

## 2020-02-07 VITALS
RESPIRATION RATE: 16 BRPM | SYSTOLIC BLOOD PRESSURE: 152 MMHG | TEMPERATURE: 98 F | DIASTOLIC BLOOD PRESSURE: 76 MMHG | OXYGEN SATURATION: 98 % | HEART RATE: 80 BPM

## 2020-02-07 DIAGNOSIS — L02.91 ABSCESS: Primary | ICD-10-CM

## 2020-02-07 PROCEDURE — 99283 EMERGENCY DEPT VISIT LOW MDM: CPT

## 2020-02-07 PROCEDURE — 10061 I&D ABSCESS COMP/MULTIPLE: CPT

## 2020-02-07 PROCEDURE — 99282 EMERGENCY DEPT VISIT SF MDM: CPT

## 2020-02-07 NOTE — ED INITIAL ASSESSMENT (HPI)
Pt to ED with complaints of worsening abscess to upper back. Pt reports having abscess drained to upper back last Thursday, on oral antibiotics. Pt reports noticing abscess for total of 2 weeks.

## 2020-02-08 NOTE — ED PROVIDER NOTES
Patient Seen in: [facility] Emergency Department      History   Patient presents with:  Abscess    Stated Complaint: abscess    HPI    Patient is a 23-year-old male comes in emergency room for an abscess to his back.   Patient had abscess incised and Device 02/07/20 1534 None (Room air)       Current:/76   Pulse 80   Temp 97.8 °F (36.6 °C) (Oral)   Resp 16   SpO2 98%         Physical Exam    Constitutional: Well-appearing no acute distress  Skin: Patient has an area of slight redness midline post needed        Medications Prescribed:  Discharge Medication List as of 2/7/2020  3:50 PM

## 2020-02-14 ENCOUNTER — HOSPITAL ENCOUNTER (OUTPATIENT)
Facility: HOSPITAL | Age: 75
Setting detail: HOSPITAL OUTPATIENT SURGERY
Discharge: HOME | End: 2020-02-14
Attending: NEUROLOGICAL SURGERY | Admitting: NEUROLOGICAL SURGERY
Payer: COMMERCIAL

## 2020-02-14 ENCOUNTER — ANESTHESIA (OUTPATIENT)
Dept: SURGERY | Facility: HOSPITAL | Age: 75
End: 2020-02-14
Payer: COMMERCIAL

## 2020-02-14 ENCOUNTER — ANESTHESIA EVENT (OUTPATIENT)
Dept: SURGERY | Facility: HOSPITAL | Age: 75
End: 2020-02-14
Payer: COMMERCIAL

## 2020-02-14 VITALS
SYSTOLIC BLOOD PRESSURE: 137 MMHG | DIASTOLIC BLOOD PRESSURE: 65 MMHG | TEMPERATURE: 97 F | RESPIRATION RATE: 16 BRPM | BODY MASS INDEX: 33.57 KG/M2 | HEIGHT: 64 IN | WEIGHT: 196.63 LBS | OXYGEN SATURATION: 98 % | HEART RATE: 67 BPM

## 2020-02-14 DIAGNOSIS — R29.898 RIGHT ARM WEAKNESS: ICD-10-CM

## 2020-02-14 LAB
GLUCOSE BLD-MCNC: 85 MG/DL (ref 70–99)
GLUCOSE BLD-MCNC: 91 MG/DL (ref 70–99)

## 2020-02-14 PROCEDURE — 82962 GLUCOSE BLOOD TEST: CPT

## 2020-02-14 PROCEDURE — 01N50ZZ RELEASE MEDIAN NERVE, OPEN APPROACH: ICD-10-PCS | Performed by: NEUROLOGICAL SURGERY

## 2020-02-14 PROCEDURE — 36410 VNPNXR 3YR/> PHY/QHP DX/THER: CPT | Performed by: NEUROLOGICAL SURGERY

## 2020-02-14 PROCEDURE — 01N40ZZ RELEASE ULNAR NERVE, OPEN APPROACH: ICD-10-PCS | Performed by: NEUROLOGICAL SURGERY

## 2020-02-14 PROCEDURE — 76937 US GUIDE VASCULAR ACCESS: CPT | Performed by: NEUROLOGICAL SURGERY

## 2020-02-14 RX ORDER — CEPHALEXIN 500 MG/1
500 CAPSULE ORAL 4 TIMES DAILY
Qty: 12 CAPSULE | Refills: 0 | Status: SHIPPED | OUTPATIENT
Start: 2020-02-14 | End: 2020-06-25 | Stop reason: ALTCHOICE

## 2020-02-14 RX ORDER — BUPIVACAINE HYDROCHLORIDE AND EPINEPHRINE 2.5; 5 MG/ML; UG/ML
INJECTION, SOLUTION EPIDURAL; INFILTRATION; INTRACAUDAL; PERINEURAL AS NEEDED
Status: DISCONTINUED | OUTPATIENT
Start: 2020-02-14 | End: 2020-02-14 | Stop reason: HOSPADM

## 2020-02-14 RX ORDER — HYDROCODONE BITARTRATE AND ACETAMINOPHEN 5; 325 MG/1; MG/1
2 TABLET ORAL AS NEEDED
Status: DISCONTINUED | OUTPATIENT
Start: 2020-02-14 | End: 2020-02-14

## 2020-02-14 RX ORDER — ACETAMINOPHEN 500 MG
1000 TABLET ORAL ONCE
Status: DISCONTINUED | OUTPATIENT
Start: 2020-02-14 | End: 2020-02-14 | Stop reason: HOSPADM

## 2020-02-14 RX ORDER — HYDROCODONE BITARTRATE AND ACETAMINOPHEN 5; 325 MG/1; MG/1
1-2 TABLET ORAL EVERY 4 HOURS PRN
Qty: 45 TABLET | Refills: 0 | Status: ON HOLD | OUTPATIENT
Start: 2020-02-14 | End: 2020-10-23

## 2020-02-14 RX ORDER — CEFAZOLIN SODIUM/WATER 2 G/20 ML
SYRINGE (ML) INTRAVENOUS
Status: DISCONTINUED
Start: 2020-02-14 | End: 2020-02-14

## 2020-02-14 RX ORDER — SODIUM CHLORIDE, SODIUM LACTATE, POTASSIUM CHLORIDE, CALCIUM CHLORIDE 600; 310; 30; 20 MG/100ML; MG/100ML; MG/100ML; MG/100ML
INJECTION, SOLUTION INTRAVENOUS CONTINUOUS
Status: DISCONTINUED | OUTPATIENT
Start: 2020-02-14 | End: 2020-02-14

## 2020-02-14 RX ORDER — CEFAZOLIN SODIUM/WATER 2 G/20 ML
2 SYRINGE (ML) INTRAVENOUS ONCE
Status: COMPLETED | OUTPATIENT
Start: 2020-02-14 | End: 2020-02-14

## 2020-02-14 RX ORDER — DEXTROSE MONOHYDRATE 25 G/50ML
50 INJECTION, SOLUTION INTRAVENOUS
Status: DISCONTINUED | OUTPATIENT
Start: 2020-02-14 | End: 2020-02-14

## 2020-02-14 RX ORDER — MEPERIDINE HYDROCHLORIDE 25 MG/ML
12.5 INJECTION INTRAMUSCULAR; INTRAVENOUS; SUBCUTANEOUS AS NEEDED
Status: DISCONTINUED | OUTPATIENT
Start: 2020-02-14 | End: 2020-02-14

## 2020-02-14 RX ORDER — BUPIVACAINE HYDROCHLORIDE 2.5 MG/ML
INJECTION, SOLUTION EPIDURAL; INFILTRATION; INTRACAUDAL AS NEEDED
Status: DISCONTINUED | OUTPATIENT
Start: 2020-02-14 | End: 2020-02-14 | Stop reason: HOSPADM

## 2020-02-14 RX ORDER — HYDROCODONE BITARTRATE AND ACETAMINOPHEN 5; 325 MG/1; MG/1
1 TABLET ORAL AS NEEDED
Status: DISCONTINUED | OUTPATIENT
Start: 2020-02-14 | End: 2020-02-14

## 2020-02-14 RX ORDER — NALOXONE HYDROCHLORIDE 0.4 MG/ML
80 INJECTION, SOLUTION INTRAMUSCULAR; INTRAVENOUS; SUBCUTANEOUS AS NEEDED
Status: DISCONTINUED | OUTPATIENT
Start: 2020-02-14 | End: 2020-02-14

## 2020-02-14 RX ORDER — HYDROMORPHONE HYDROCHLORIDE 1 MG/ML
0.4 INJECTION, SOLUTION INTRAMUSCULAR; INTRAVENOUS; SUBCUTANEOUS EVERY 5 MIN PRN
Status: DISCONTINUED | OUTPATIENT
Start: 2020-02-14 | End: 2020-02-14

## 2020-02-14 RX ORDER — ONDANSETRON 2 MG/ML
INJECTION INTRAMUSCULAR; INTRAVENOUS AS NEEDED
Status: DISCONTINUED | OUTPATIENT
Start: 2020-02-14 | End: 2020-02-14 | Stop reason: SURG

## 2020-02-14 RX ORDER — METOCLOPRAMIDE HYDROCHLORIDE 5 MG/ML
INJECTION INTRAMUSCULAR; INTRAVENOUS AS NEEDED
Status: DISCONTINUED | OUTPATIENT
Start: 2020-02-14 | End: 2020-02-14 | Stop reason: SURG

## 2020-02-14 RX ORDER — DEXTROSE MONOHYDRATE 25 G/50ML
50 INJECTION, SOLUTION INTRAVENOUS
Status: DISCONTINUED | OUTPATIENT
Start: 2020-02-14 | End: 2020-02-14 | Stop reason: HOSPADM

## 2020-02-14 RX ORDER — ONDANSETRON 2 MG/ML
4 INJECTION INTRAMUSCULAR; INTRAVENOUS AS NEEDED
Status: DISCONTINUED | OUTPATIENT
Start: 2020-02-14 | End: 2020-02-14

## 2020-02-14 RX ADMIN — METOCLOPRAMIDE HYDROCHLORIDE 10 MG: 5 INJECTION INTRAMUSCULAR; INTRAVENOUS at 09:01:00

## 2020-02-14 RX ADMIN — ONDANSETRON 4 MG: 2 INJECTION INTRAMUSCULAR; INTRAVENOUS at 09:01:00

## 2020-02-14 RX ADMIN — CEFAZOLIN SODIUM/WATER 2 G: 2 G/20 ML SYRINGE (ML) INTRAVENOUS at 07:40:00

## 2020-02-14 RX ADMIN — SODIUM CHLORIDE, SODIUM LACTATE, POTASSIUM CHLORIDE, CALCIUM CHLORIDE: 600; 310; 30; 20 INJECTION, SOLUTION INTRAVENOUS at 07:42:00

## 2020-02-14 RX ADMIN — SODIUM CHLORIDE, SODIUM LACTATE, POTASSIUM CHLORIDE, CALCIUM CHLORIDE: 600; 310; 30; 20 INJECTION, SOLUTION INTRAVENOUS at 09:22:00

## 2020-02-14 NOTE — INTERVAL H&P NOTE
Pre-op Diagnosis: Right arm weakness [R29.898]    The above referenced H&P was reviewed by Carlos Baltazar MD on 2/14/2020, the patient was examined and no significant changes have occurred in the patient's condition since the H&P was performed.   I dis

## 2020-02-14 NOTE — ANESTHESIA POSTPROCEDURE EVALUATION
[facility] Patient Status:  Hospital Outpatient Surgery   Age/Gender 69year old male MRN [id number]   Location [address] [facility] Attending Eri Kuo MD   Hosp Day # 0 PCP Nj Ellis MD

## 2020-02-14 NOTE — ANESTHESIA PREPROCEDURE EVALUATION
PRE-OP EVALUATION    Patient Name: Tyler Art    Pre-op Diagnosis: Right arm weakness [R29.898]    Procedure(s):  Left ulnar nerve decompression with possible transposition and LEFT carpal tunnel release and all indicated procedures        Surgeon( 100 UNIT/ML Subcutaneous Solution, Inject 20 Units into the skin 2 (two) times daily. , Disp: , Rfl:         Allergies: Patient has no known allergies. Anesthesia Evaluation    Patient summary reviewed.     Anesthetic Complications  (-) history of anest 13.4 01/15/2020    .0 01/15/2020     Lab Results   Component Value Date     01/15/2020    K 4.1 01/15/2020     01/15/2020    CO2 27.0 01/15/2020    BUN 20 (H) 01/15/2020    CREATSERUM 0.79 01/15/2020    GLU 82 01/15/2020    CA 9.0 01/15/

## 2020-02-14 NOTE — OPERATIVE REPORT
Operative Note    Patient Name: Soledad Gann    Preoperative Diagnosis: Right arm weakness [R29.898]    Postoperative Diagnosis: same    Primary Surgeon: Kayla Starr    Assistant: none    Procedures: Left ulnar nerve decompression and left car turned our attention to his carpal tunnel. 80 mils a course of Marcaine was given as local anesthetic. Incision was made. Dissection down to the carpal ligament. The carpal ligament was opened and the nerve was seen.   The carpal ligament was then divid

## 2020-02-14 NOTE — ANESTHESIA POSTPROCEDURE EVALUATION
[facility] [facility] Patient Status:  Hospital Outpatient Surgery   Age/Gender 69year old male MRN [id number]   Location [facility] Attending Darryle Gross, MD   Hosp Day # 0 PCP Jhonny Sims MD

## 2020-02-14 NOTE — ANESTHESIA PROCEDURE NOTES
Airway    General Information and Staff    Patient location during procedure: OR  Anesthesiologist: Shawanda Guzman MD  Performed: anesthesiologist     Indications and Patient Condition  Indications for airway management: anesthesia  Sedation level: deep

## 2020-02-28 ENCOUNTER — OFFICE VISIT (OUTPATIENT)
Dept: SURGERY | Facility: CLINIC | Age: 75
End: 2020-02-28

## 2020-02-28 VITALS — DIASTOLIC BLOOD PRESSURE: 66 MMHG | SYSTOLIC BLOOD PRESSURE: 144 MMHG | HEART RATE: 76 BPM

## 2020-02-28 DIAGNOSIS — G56.23 ULNAR NEUROPATHY OF BOTH UPPER EXTREMITIES: Primary | ICD-10-CM

## 2020-02-28 DIAGNOSIS — G56.03 CARPAL TUNNEL SYNDROME, BILATERAL UPPER LIMBS: ICD-10-CM

## 2020-02-28 PROCEDURE — 99024 POSTOP FOLLOW-UP VISIT: CPT | Performed by: PHYSICIAN ASSISTANT

## 2020-02-28 RX ORDER — MOMETASONE FUROATE 1 MG/G
1 CREAM TOPICAL AS NEEDED
COMMUNITY
Start: 2014-03-12 | End: 2021-07-23

## 2020-02-28 RX ORDER — HYDROCORTISONE AND ACETIC ACID 1.1; 2.41 G/100ML; G/100ML
1 SOLUTION AURICULAR (OTIC) AS NEEDED
Status: ON HOLD | COMMUNITY
Start: 2017-11-14 | End: 2020-10-19 | Stop reason: CLARIF

## 2020-02-28 RX ORDER — ERGOCALCIFEROL (VITAMIN D2) 1250 MCG
1 CAPSULE ORAL WEEKLY
Status: ON HOLD | COMMUNITY
End: 2020-10-19 | Stop reason: CLARIF

## 2020-02-28 RX ORDER — MECOBAL/LEVOMEFOLAT CA/B6 PHOS 2-3-35 MG
1 TABLET ORAL 2 TIMES DAILY
COMMUNITY
Start: 2020-01-17 | End: 2021-06-17

## 2020-02-28 RX ORDER — CLOTRIMAZOLE AND BETAMETHASONE DIPROPIONATE 10; .64 MG/G; MG/G
1 CREAM TOPICAL AS NEEDED
COMMUNITY
Start: 2017-11-14

## 2020-02-28 RX ORDER — ASPIRIN 81 MG/1
1 TABLET, CHEWABLE ORAL DAILY
COMMUNITY
End: 2020-11-05

## 2020-02-28 RX ORDER — CLOTRIMAZOLE 1 %
1 CREAM (GRAM) TOPICAL AS NEEDED
Status: ON HOLD | COMMUNITY
Start: 2014-03-08 | End: 2020-10-19 | Stop reason: CLARIF

## 2020-02-28 RX ORDER — NEOMYCIN SULFATE, POLYMYXIN B SULFATE, HYDROCORTISONE 3.5; 10000; 1 MG/ML; [USP'U]/ML; MG/ML
1 SOLUTION/ DROPS AURICULAR (OTIC) AS NEEDED
COMMUNITY
Start: 2019-02-27 | End: 2021-06-30 | Stop reason: ALTCHOICE

## 2020-02-28 RX ORDER — FINASTERIDE 5 MG/1
1 TABLET, FILM COATED ORAL DAILY
COMMUNITY
End: 2020-06-25 | Stop reason: ALTCHOICE

## 2020-02-28 NOTE — PROGRESS NOTES
Pt is here for two week post op. Left ulnar nerve decompression and LEFT carpal tunnel release. ULNAR NERVE TRANSPOSITION. WRIST CARPAL TUNNEL RELEASE. 2/14/2020       Pt states he feels good.

## 2020-02-28 NOTE — PROGRESS NOTES
Neurosurgery Clinic Visit  2020    Alfonso Larkin PCP:  Lian Yates MD    1945 MRN [id number]     HISTORY OF PRESENT ILLNESS:  Alfonso Larkin is a(n) 69year old male who is here 2 weeks s/p left ulnar nerve decompression and carpa

## 2020-03-12 ENCOUNTER — OFFICE VISIT (OUTPATIENT)
Dept: FAMILY MEDICINE CLINIC | Facility: CLINIC | Age: 75
End: 2020-03-12

## 2020-03-12 VITALS
HEIGHT: 63 IN | RESPIRATION RATE: 14 BRPM | WEIGHT: 193.81 LBS | HEART RATE: 84 BPM | TEMPERATURE: 98 F | BODY MASS INDEX: 34.34 KG/M2 | DIASTOLIC BLOOD PRESSURE: 76 MMHG | SYSTOLIC BLOOD PRESSURE: 134 MMHG

## 2020-03-12 DIAGNOSIS — R80.9 TYPE 2 DIABETES MELLITUS WITH MICROALBUMINURIA, WITH LONG-TERM CURRENT USE OF INSULIN (HCC): ICD-10-CM

## 2020-03-12 DIAGNOSIS — E11.29 TYPE 2 DIABETES MELLITUS WITH MICROALBUMINURIA, WITH LONG-TERM CURRENT USE OF INSULIN (HCC): ICD-10-CM

## 2020-03-12 DIAGNOSIS — B35.6 TINEA CRURIS: Primary | ICD-10-CM

## 2020-03-12 DIAGNOSIS — L23.1 CONTACT DERMATITIS DUE TO ADHESIVES, UNSPECIFIED CONTACT DERMATITIS TYPE: ICD-10-CM

## 2020-03-12 DIAGNOSIS — Z79.4 TYPE 2 DIABETES MELLITUS WITH MICROALBUMINURIA, WITH LONG-TERM CURRENT USE OF INSULIN (HCC): ICD-10-CM

## 2020-03-12 DIAGNOSIS — G56.23 ULNAR NEUROPATHY OF BOTH UPPER EXTREMITIES: ICD-10-CM

## 2020-03-12 DIAGNOSIS — M25.561 CHRONIC PAIN OF BOTH KNEES: ICD-10-CM

## 2020-03-12 DIAGNOSIS — M75.111 NONTRAUMATIC INCOMPLETE TEAR OF RIGHT ROTATOR CUFF: ICD-10-CM

## 2020-03-12 DIAGNOSIS — G89.29 CHRONIC PAIN OF BOTH KNEES: ICD-10-CM

## 2020-03-12 DIAGNOSIS — E08.42 DIABETIC POLYNEUROPATHY ASSOCIATED WITH DIABETES MELLITUS DUE TO UNDERLYING CONDITION (HCC): ICD-10-CM

## 2020-03-12 DIAGNOSIS — M25.562 CHRONIC PAIN OF BOTH KNEES: ICD-10-CM

## 2020-03-12 DIAGNOSIS — M62.511 MUSCLE WASTING AND ATROPHY, NOT ELSEWHERE CLASSIFIED, RIGHT SHOULDER: ICD-10-CM

## 2020-03-12 PROCEDURE — 99204 OFFICE O/P NEW MOD 45 MIN: CPT | Performed by: FAMILY MEDICINE

## 2020-03-12 RX ORDER — TERBINAFINE HYDROCHLORIDE 250 MG/1
250 TABLET ORAL DAILY
Qty: 14 TABLET | Refills: 0 | Status: SHIPPED | OUTPATIENT
Start: 2020-03-12 | End: 2020-06-25 | Stop reason: ALTCHOICE

## 2020-03-12 NOTE — PROGRESS NOTES
Patient presents with:  Establish Care: New pt     HPI:   Yoselin Mackey is a 69year old [place] male who presents to the office as a new patient to establish care. Used to see Dr. Vu Oscar. His children come here and see me.      Nerves - bilateral limit him. 4 children. Son, daughter, son (MD at [employer]), son.       Patient Active Problem List:     Ulnar neuropathy     Carpal tunnel syndrome     Diabetic neuropathy (HCC)     Neuropathy     Type 2 diabetes mellitus with microalbuminuria, with long-t mouth daily.   , Disp: , Rfl: 5  ergocalciferol 03941 units Oral Cap, Take 50,000 Units by mouth once a week.  , Disp: , Rfl: 3  TOUJEO SOLOSTAR 300 UNIT/ML Subcutaneous Solution Pen-injector, Inject 60 Units into the skin every morning.  , Disp: , Rfl: 1 arm weakness, R shoulder weakness. Derm - rash in groin, on abdomen.   EXAM:   /76   Pulse 84   Temp 98 °F (36.7 °C)   Resp 14   Ht 63\"   Wt 193 lb 12.8 oz (87.9 kg)   BMI 34.33 kg/m²     General appearance: alert, appears stated age and cooperativ compressive perineural lesions. 4.  Chronic partial-thickness tear, SLAP type IIB lesion, of the glenoid labrum. MRI cervical spine:   CERVICAL DISC LEVELS:  C2-C3:  Posterior disc osteophyte complex is noted. Mild facet hypertrophic changes.   There i (SGPT)      16 - 61 U/L 24   ALKALINE PHOSPHATASE      45 - 117 U/L 70   Total Bilirubin      0.1 - 2.0 mg/dL 0.7   TOTAL PROTEIN      6.4 - 8.2 g/dL 7.9   Albumin      3.4 - 5.0 g/dL 3.3 (L)   Globulin      2.8 - 4.4 g/dL 4.6 (H)   A/G Ratio      1.0 - 2. make any improvement. Surgeon recommended escalating level of care to a more specialized PT facility - referrals will be put in for [facility] for PT. This is affecting shoulder, which also did not improve, so PT will be ordered for this as well.    - P

## 2020-03-17 ENCOUNTER — OFFICE VISIT (OUTPATIENT)
Dept: SURGERY | Facility: CLINIC | Age: 75
End: 2020-03-17

## 2020-03-17 ENCOUNTER — TELEPHONE (OUTPATIENT)
Dept: FAMILY MEDICINE CLINIC | Facility: CLINIC | Age: 75
End: 2020-03-17

## 2020-03-17 VITALS — DIASTOLIC BLOOD PRESSURE: 70 MMHG | HEART RATE: 79 BPM | SYSTOLIC BLOOD PRESSURE: 144 MMHG

## 2020-03-17 DIAGNOSIS — Z98.890 S/P DECOMPRESSION OF ULNAR NERVE: ICD-10-CM

## 2020-03-17 DIAGNOSIS — Z98.890 S/P CARPAL TUNNEL RELEASE: Primary | ICD-10-CM

## 2020-03-17 PROCEDURE — 99024 POSTOP FOLLOW-UP VISIT: CPT | Performed by: NEUROLOGICAL SURGERY

## 2020-03-17 RX ORDER — HYDROCORTISONE AND ACETIC ACID 1.1; 2.41 G/100ML; G/100ML
SOLUTION AURICULAR (OTIC) AS NEEDED
COMMUNITY
Start: 2017-11-14 | End: 2021-07-23

## 2020-03-17 RX ORDER — CARVEDILOL 3.12 MG/1
1 TABLET ORAL DAILY
Status: ON HOLD | COMMUNITY
Start: 2019-05-13 | End: 2020-10-19 | Stop reason: CLARIF

## 2020-03-17 RX ORDER — ERGOCALCIFEROL 1.25 MG/1
1 CAPSULE ORAL WEEKLY
Status: ON HOLD | COMMUNITY
End: 2020-10-19 | Stop reason: CLARIF

## 2020-03-17 RX ORDER — FINASTERIDE 5 MG/1
1 TABLET, FILM COATED ORAL DAILY
COMMUNITY
End: 2020-06-25 | Stop reason: ALTCHOICE

## 2020-03-17 RX ORDER — AMOXICILLIN AND CLAVULANATE POTASSIUM 875; 125 MG/1; MG/1
1 TABLET, FILM COATED ORAL DAILY
COMMUNITY
Start: 2020-01-30 | End: 2020-06-18 | Stop reason: ALTCHOICE

## 2020-03-17 NOTE — TELEPHONE ENCOUNTER
TC to patient to let him know PT is approved. Patient notified. He asked that I send him phone number for [facility].

## 2020-03-17 NOTE — PROGRESS NOTES
Pt is here for 6 week post op. Left ulnar nerve decompression and LEFT carpal tunnel release. ULNAR NERVE TRANSPOSITION. WRIST CARPAL TUNNEL RELEASE. 2/14/2020     Pt states he is doing very well.

## 2020-03-17 NOTE — PROGRESS NOTES
[facility]  Neurosurgery Progress Note    Brent Camara Patient Status:  No patient class for patient encounter    1945 MRN [id number]     Subjective:  Joni Fields is a(n) 69year old male. He is doing well.  He feels his fine motor

## 2020-03-26 ENCOUNTER — TELEPHONE (OUTPATIENT)
Dept: FAMILY MEDICINE CLINIC | Facility: CLINIC | Age: 75
End: 2020-03-26

## 2020-03-26 NOTE — TELEPHONE ENCOUNTER
Patient is calling stating he spoke to [address] [place] [address] regarding his referral for [facility] and they told him there's no referral started.  Patient stated when he was last seen by Dr. Nani Dunn 3/12/2020 he was told a referral for [facility] will

## 2020-03-26 NOTE — TELEPHONE ENCOUNTER
Reviewed Chart, Neurosurgeon placed referral on 3/17/2020 which is still in OPEN status  ALSO on 3/12/2020 Dr Jacy Abraham placed another referral to [facility] which was approve  Previously patient completed visits with Occupational Therapy, not physical therapy

## 2020-03-26 NOTE — TELEPHONE ENCOUNTER
Please call patient and inform her  Referral for PT was placed by Dr Karsten Enrique on 3/12/2020   however it was placed incorrectly.   Will need to start correct process today   This is an out-of-network facility and could take up to 2 weeks for approval or denial

## 2020-03-31 ENCOUNTER — TELEPHONE (OUTPATIENT)
Dept: FAMILY MEDICINE CLINIC | Facility: CLINIC | Age: 75
End: 2020-03-31

## 2020-03-31 DIAGNOSIS — R80.9 TYPE 2 DIABETES MELLITUS WITH MICROALBUMINURIA, WITH LONG-TERM CURRENT USE OF INSULIN (HCC): Primary | ICD-10-CM

## 2020-03-31 DIAGNOSIS — Z79.4 TYPE 2 DIABETES MELLITUS WITH MICROALBUMINURIA, WITH LONG-TERM CURRENT USE OF INSULIN (HCC): Primary | ICD-10-CM

## 2020-03-31 DIAGNOSIS — E11.29 TYPE 2 DIABETES MELLITUS WITH MICROALBUMINURIA, WITH LONG-TERM CURRENT USE OF INSULIN (HCC): Primary | ICD-10-CM

## 2020-03-31 RX ORDER — LOSARTAN POTASSIUM 100 MG/1
100 TABLET ORAL DAILY
Qty: 90 TABLET | Refills: 1 | Status: SHIPPED | OUTPATIENT
Start: 2020-03-31 | End: 2020-07-06

## 2020-03-31 RX ORDER — ATORVASTATIN CALCIUM 10 MG/1
10 TABLET, FILM COATED ORAL DAILY
Qty: 90 TABLET | Refills: 1 | Status: SHIPPED | OUTPATIENT
Start: 2020-03-31 | End: 2020-03-31

## 2020-03-31 RX ORDER — ATORVASTATIN CALCIUM 10 MG/1
10 TABLET, FILM COATED ORAL DAILY
Qty: 90 TABLET | Refills: 1 | Status: SHIPPED | OUTPATIENT
Start: 2020-03-31 | End: 2020-07-06

## 2020-03-31 RX ORDER — LOSARTAN POTASSIUM 100 MG/1
100 TABLET ORAL DAILY
Qty: 90 TABLET | Refills: 1 | Status: SHIPPED | OUTPATIENT
Start: 2020-03-31 | End: 2020-03-31

## 2020-03-31 NOTE — TELEPHONE ENCOUNTER
Patient requesting refills on medications that Dr. Krasten Enrique has not yet filled for patient but is in need of Vitamin D, Atorvastatin, Losartan and Metformin.  Patient would like these sent to [facility]

## 2020-03-31 NOTE — TELEPHONE ENCOUNTER
Medications he is requesting were sent to the mail order. I recommend against regular high dose vit D at this time without checking labs, as levels can get toxic with this dosing. im going to hold off on the high dose Vit D at this time.      Can we call

## 2020-04-01 ENCOUNTER — TELEPHONE (OUTPATIENT)
Dept: SURGERY | Facility: CLINIC | Age: 75
End: 2020-04-01

## 2020-04-01 NOTE — TELEPHONE ENCOUNTER
Called [facility], [phone] in regards to referral #:[id number]. Requested to speak to medical director, Dr. Matilda Segovia. Spoke with him, and he states he will approve this. He will send information later today.

## 2020-04-02 ENCOUNTER — TELEPHONE (OUTPATIENT)
Dept: FAMILY MEDICINE CLINIC | Facility: CLINIC | Age: 75
End: 2020-04-02

## 2020-04-02 DIAGNOSIS — Z79.4 TYPE 2 DIABETES MELLITUS WITH MICROALBUMINURIA, WITH LONG-TERM CURRENT USE OF INSULIN (HCC): Primary | ICD-10-CM

## 2020-04-02 DIAGNOSIS — R80.9 TYPE 2 DIABETES MELLITUS WITH MICROALBUMINURIA, WITH LONG-TERM CURRENT USE OF INSULIN (HCC): Primary | ICD-10-CM

## 2020-04-02 DIAGNOSIS — E11.29 TYPE 2 DIABETES MELLITUS WITH MICROALBUMINURIA, WITH LONG-TERM CURRENT USE OF INSULIN (HCC): Primary | ICD-10-CM

## 2020-04-02 PROCEDURE — 99441 PHONE E/M BY PHYS 5-10 MIN: CPT | Performed by: NURSE PRACTITIONER

## 2020-04-02 NOTE — TELEPHONE ENCOUNTER
Patent called requesting lab work to be done, he would mainly want his A1C checked. Asking if Dr Gibran Mckeon would approve this at this time or hold off?

## 2020-04-02 NOTE — TELEPHONE ENCOUNTER
Virtual Check-In    Kenyatta Ortiz verbally consents to a [employer] on 04/02/20. Patient understands and accepts financial responsibility for any deductible, co-insurance and/or co-pays associated with this service.     Duration of the s

## 2020-04-06 ENCOUNTER — TELEPHONE (OUTPATIENT)
Dept: SURGERY | Facility: CLINIC | Age: 75
End: 2020-04-06

## 2020-04-06 DIAGNOSIS — Z98.890 S/P CARPAL TUNNEL RELEASE: ICD-10-CM

## 2020-04-06 DIAGNOSIS — M47.22 CERVICAL SPONDYLOSIS WITH RADICULOPATHY: ICD-10-CM

## 2020-04-06 DIAGNOSIS — G56.23 ULNAR NEUROPATHY OF BOTH UPPER EXTREMITIES: ICD-10-CM

## 2020-04-06 DIAGNOSIS — Z98.890 S/P DECOMPRESSION OF ULNAR NERVE: Primary | ICD-10-CM

## 2020-04-06 NOTE — TELEPHONE ENCOUNTER
Order placed for PT at [facility]3].

## 2020-04-06 NOTE — TELEPHONE ENCOUNTER
pt calling stating he should also have an order for PT in addition to OT, MD's notes do not mention PT, please call back

## 2020-04-06 NOTE — TELEPHONE ENCOUNTER
Patient underwent surgery on 2/14/20:  Left ulnar nerve decompression and LEFT carpal tunnel release.   Noted in chart \"referrals tab\" that PT has been ordered per PCP, Dr. Jumana Oneal.       Called patient to determine what his request is in regard to, as

## 2020-04-07 NOTE — TELEPHONE ENCOUNTER
Per PT order: \"[facility]  Eval/treat, s/p left ulnar nerve decompression and left carpal tunnel release on 2/14/2020. \"    PT order is currently pending with insurance.

## 2020-04-07 NOTE — TELEPHONE ENCOUNTER
Patient indicating that he talked with Daily Vasquez and was told that PT order was received for wrist.   Patient indicated that was wrong. PT should be for Elbow. OT is for wrist.  Please correct order.

## 2020-04-13 NOTE — TELEPHONE ENCOUNTER
[facility] PA referral updated. PT/OT now authorized with [employer]. Referral auth faxed to [facility] fax #: [phone].

## 2020-04-16 ENCOUNTER — TELEPHONE (OUTPATIENT)
Dept: FAMILY MEDICINE CLINIC | Facility: CLINIC | Age: 75
End: 2020-04-16

## 2020-04-16 DIAGNOSIS — E11.649 HYPOGLYCEMIA UNAWARENESS ASSOCIATED WITH TYPE 2 DIABETES MELLITUS (HCC): ICD-10-CM

## 2020-04-16 DIAGNOSIS — Z86.39 HISTORY OF ELEVATED GLUCOSE: ICD-10-CM

## 2020-04-16 DIAGNOSIS — R73.09 POOR GLYCEMIC CONTROL: ICD-10-CM

## 2020-04-16 DIAGNOSIS — E11.29 TYPE 2 DIABETES MELLITUS WITH MICROALBUMINURIA, WITH LONG-TERM CURRENT USE OF INSULIN (HCC): Primary | ICD-10-CM

## 2020-04-16 DIAGNOSIS — R80.9 TYPE 2 DIABETES MELLITUS WITH MICROALBUMINURIA, WITH LONG-TERM CURRENT USE OF INSULIN (HCC): Primary | ICD-10-CM

## 2020-04-16 DIAGNOSIS — Z79.4 TYPE 2 DIABETES MELLITUS WITH MICROALBUMINURIA, WITH LONG-TERM CURRENT USE OF INSULIN (HCC): Primary | ICD-10-CM

## 2020-04-16 NOTE — TELEPHONE ENCOUNTER
Patient is requesting G6 continuous glucose monitor and supplies from [facility] medical supplies  Phone number [phone]. Patient states we should already have on file everything that he uses and needs. Please contact patient once completed.  Patient stat

## 2020-04-16 NOTE — TELEPHONE ENCOUNTER
Called [facility] and talked to their billing department they need prior auth in order to fill this request for CGM G6.  They are going to fax the request to us as it has been sitting from March of this year

## 2020-04-17 NOTE — TELEPHONE ENCOUNTER
Dr Franco Anlgin, you have seen this patient one time on 3/31/2020  We received a requested for PA on CGM DEXCOM system  Do you want patient to be seen by Diabetic Services?

## 2020-04-17 NOTE — TELEPHONE ENCOUNTER
SENSOR; INVASIVE (E.G., SUBCUTANEOUS), DISPOSABLE, FOR USE WITH INTERSTITIAL CONTINUOUS GLUCOSE MONITORING SYSTEM, ONE UNIT = ONE DAY #365 = 365 DAYS    OR   THERAPEUTIC CONTINUOUS GLUCOSE MONITOR INCLUDES ALL SUPPLIES AND ACCESSORIES,  1 UNIT =

## 2020-04-20 ENCOUNTER — TELEPHONE (OUTPATIENT)
Dept: FAMILY MEDICINE CLINIC | Facility: CLINIC | Age: 75
End: 2020-04-20

## 2020-04-21 NOTE — TELEPHONE ENCOUNTER
Spoke to pt who was very frustrated that this process is taking this long for him to get his medical supplies.   Pt provided me with the referral #[id number] and requested we contact the insurance to have them fax information or see what needs to be done to g

## 2020-04-21 NOTE — TELEPHONE ENCOUNTER
I spoke to [facility] and provided her with the referral #[id number] for pt's DME supplies.   She informed me we need to add the CPT codes and change the status to pending and someone will work on the referral.

## 2020-05-20 ENCOUNTER — TELEPHONE (OUTPATIENT)
Dept: SURGERY | Facility: CLINIC | Age: 75
End: 2020-05-20

## 2020-06-11 ENCOUNTER — TELEPHONE (OUTPATIENT)
Dept: FAMILY MEDICINE CLINIC | Facility: CLINIC | Age: 75
End: 2020-06-11

## 2020-06-11 NOTE — TELEPHONE ENCOUNTER
Suad with [address] is asking for additions Occupational Therapy orders for the patient (10). Progress reports were faxed to Dr. Abundio Caraballo to review. Paperwork in Dr. Derick Brooke in box.

## 2020-06-16 ENCOUNTER — TELEPHONE (OUTPATIENT)
Dept: FAMILY MEDICINE CLINIC | Facility: CLINIC | Age: 75
End: 2020-06-16

## 2020-06-16 DIAGNOSIS — R80.9 TYPE 2 DIABETES MELLITUS WITH MICROALBUMINURIA, WITH LONG-TERM CURRENT USE OF INSULIN (HCC): Primary | ICD-10-CM

## 2020-06-16 DIAGNOSIS — E11.29 TYPE 2 DIABETES MELLITUS WITH MICROALBUMINURIA, WITH LONG-TERM CURRENT USE OF INSULIN (HCC): Primary | ICD-10-CM

## 2020-06-16 DIAGNOSIS — Z79.4 TYPE 2 DIABETES MELLITUS WITH MICROALBUMINURIA, WITH LONG-TERM CURRENT USE OF INSULIN (HCC): Primary | ICD-10-CM

## 2020-06-16 RX ORDER — INSULIN GLARGINE 300 U/ML
60 INJECTION, SOLUTION SUBCUTANEOUS EVERY MORNING
Qty: 18 ML | Refills: 1 | Status: SHIPPED | OUTPATIENT
Start: 2020-06-16 | End: 2020-12-13

## 2020-06-16 RX ORDER — INSULIN ASPART 100 [IU]/ML
20 INJECTION, SOLUTION INTRAVENOUS; SUBCUTANEOUS 2 TIMES DAILY
Qty: 36 ML | Refills: 1 | Status: SHIPPED | OUTPATIENT
Start: 2020-06-16 | End: 2021-10-19

## 2020-06-16 NOTE — TELEPHONE ENCOUNTER
toujeo and novolog pended. Can we confirm the doses in order to get him the 90 days at the mail order pharmacy? I also recommend against taking the 50,000 IU vit D, on a regular basis especially in the summer as levels can get toxic.

## 2020-06-16 NOTE — TELEPHONE ENCOUNTER
Spoke with patient and he takes 50/Toujeo daily, and 2x20/novolog daily. Refills to [facility]. He also agrees to stop the 50 000 units vit d for the time being.     Routed to Dr Richelle Mclain

## 2020-06-18 ENCOUNTER — OFFICE VISIT (OUTPATIENT)
Dept: SURGERY | Facility: CLINIC | Age: 75
End: 2020-06-18

## 2020-06-18 ENCOUNTER — TELEPHONE (OUTPATIENT)
Dept: FAMILY MEDICINE CLINIC | Facility: CLINIC | Age: 75
End: 2020-06-18

## 2020-06-18 VITALS — DIASTOLIC BLOOD PRESSURE: 80 MMHG | HEART RATE: 78 BPM | SYSTOLIC BLOOD PRESSURE: 138 MMHG

## 2020-06-18 DIAGNOSIS — M47.22 CERVICAL SPONDYLOSIS WITH RADICULOPATHY: ICD-10-CM

## 2020-06-18 DIAGNOSIS — Z98.890 S/P DECOMPRESSION OF ULNAR NERVE: Primary | ICD-10-CM

## 2020-06-18 DIAGNOSIS — Z79.4 TYPE 2 DIABETES MELLITUS WITH MICROALBUMINURIA, WITH LONG-TERM CURRENT USE OF INSULIN (HCC): Primary | ICD-10-CM

## 2020-06-18 DIAGNOSIS — G56.23 ULNAR NEUROPATHY OF BOTH UPPER EXTREMITIES: ICD-10-CM

## 2020-06-18 DIAGNOSIS — E11.29 TYPE 2 DIABETES MELLITUS WITH MICROALBUMINURIA, WITH LONG-TERM CURRENT USE OF INSULIN (HCC): Primary | ICD-10-CM

## 2020-06-18 DIAGNOSIS — R80.9 TYPE 2 DIABETES MELLITUS WITH MICROALBUMINURIA, WITH LONG-TERM CURRENT USE OF INSULIN (HCC): Primary | ICD-10-CM

## 2020-06-18 DIAGNOSIS — Z98.890 S/P CARPAL TUNNEL RELEASE: ICD-10-CM

## 2020-06-18 PROCEDURE — 99213 OFFICE O/P EST LOW 20 MIN: CPT | Performed by: PHYSICIAN ASSISTANT

## 2020-06-18 NOTE — PROGRESS NOTES
Neurosurgery Clinic Visit  2020    Sonny Basilio PCP:  Joanie Dobbins MD    1945 MRN [id number]     HISTORY OF PRESENT ILLNESS:  Sonny Basilio is a(n) 69year old male who is here for follow-up after left ulnar nerve decompression [place], [address] [facility]  [phone]  6/18/2020  10:34 AM

## 2020-06-18 NOTE — TELEPHONE ENCOUNTER
Referral request Dr. Genaro Woodruff M.D.,Ophthalmology    Patient seen by Dr. Carla Lr M.D. 3/12/2020  Office notes from Dr. Barbi Roberson M.D. 3/12/2020  DM - using CGM. A1C usally around 6.4. in Jan, A1C was 6.6  130 is typical BS.  +micro.     Ta

## 2020-06-22 ENCOUNTER — TELEPHONE (OUTPATIENT)
Dept: FAMILY MEDICINE CLINIC | Facility: CLINIC | Age: 75
End: 2020-06-22

## 2020-06-22 ENCOUNTER — TELEPHONE (OUTPATIENT)
Dept: SURGERY | Facility: CLINIC | Age: 75
End: 2020-06-22

## 2020-06-22 DIAGNOSIS — Z98.890 S/P DECOMPRESSION OF ULNAR NERVE: Primary | ICD-10-CM

## 2020-06-22 DIAGNOSIS — R80.9 TYPE 2 DIABETES MELLITUS WITH MICROALBUMINURIA, WITH LONG-TERM CURRENT USE OF INSULIN (HCC): Primary | ICD-10-CM

## 2020-06-22 DIAGNOSIS — E11.29 TYPE 2 DIABETES MELLITUS WITH MICROALBUMINURIA, WITH LONG-TERM CURRENT USE OF INSULIN (HCC): Primary | ICD-10-CM

## 2020-06-22 DIAGNOSIS — Z79.4 TYPE 2 DIABETES MELLITUS WITH MICROALBUMINURIA, WITH LONG-TERM CURRENT USE OF INSULIN (HCC): Primary | ICD-10-CM

## 2020-06-22 NOTE — TELEPHONE ENCOUNTER
Anne-Marie calling again from [facility]. Anne-Marie states the order faxed stated \"Inpatient\" when it's \"Outpatient\" and it states \"1 visit\" when therapists would like \"10 more visits. Pls advise.

## 2020-06-22 NOTE — TELEPHONE ENCOUNTER
Talked to patient he did not get the refill on his trulicity pended order for Dr Destiny Nguyen to refill this and sent patient the name and number for the referral to the eye doctor

## 2020-06-22 NOTE — TELEPHONE ENCOUNTER
Spoke to Anne-Marie. She is asking for 10 additional visits for patient to obtain Occupational Therapy under same reference number  .   Will pend order order ad fax to [phone] attention Anne-Marie

## 2020-06-22 NOTE — TELEPHONE ENCOUNTER
Patient is calling to follow up on refill request for trulicity.  Patient stated Sharon Pope told him they have been waiting on our office for at least 10 days and no answer

## 2020-06-22 NOTE — TELEPHONE ENCOUNTER
Order updated to state [facility] outpatient. Order faxed to [facility].

## 2020-06-25 ENCOUNTER — OFFICE VISIT (OUTPATIENT)
Dept: FAMILY MEDICINE CLINIC | Facility: CLINIC | Age: 75
End: 2020-06-25

## 2020-06-25 VITALS
HEART RATE: 70 BPM | DIASTOLIC BLOOD PRESSURE: 70 MMHG | RESPIRATION RATE: 14 BRPM | BODY MASS INDEX: 34.73 KG/M2 | TEMPERATURE: 98 F | HEIGHT: 63 IN | WEIGHT: 196 LBS | SYSTOLIC BLOOD PRESSURE: 140 MMHG

## 2020-06-25 DIAGNOSIS — Z00.00 ANNUAL PHYSICAL EXAM: Primary | ICD-10-CM

## 2020-06-25 DIAGNOSIS — E08.42 DIABETIC POLYNEUROPATHY ASSOCIATED WITH DIABETES MELLITUS DUE TO UNDERLYING CONDITION (HCC): ICD-10-CM

## 2020-06-25 DIAGNOSIS — Z79.4 TYPE 2 DIABETES MELLITUS WITH MICROALBUMINURIA, WITH LONG-TERM CURRENT USE OF INSULIN (HCC): ICD-10-CM

## 2020-06-25 DIAGNOSIS — L23.1 CONTACT DERMATITIS DUE TO ADHESIVES, UNSPECIFIED CONTACT DERMATITIS TYPE: ICD-10-CM

## 2020-06-25 DIAGNOSIS — M75.111 NONTRAUMATIC INCOMPLETE TEAR OF RIGHT ROTATOR CUFF: ICD-10-CM

## 2020-06-25 DIAGNOSIS — E55.9 VITAMIN D DEFICIENCY: ICD-10-CM

## 2020-06-25 DIAGNOSIS — E78.5 HYPERLIPIDEMIA LDL GOAL <100: ICD-10-CM

## 2020-06-25 DIAGNOSIS — R80.9 TYPE 2 DIABETES MELLITUS WITH MICROALBUMINURIA, WITH LONG-TERM CURRENT USE OF INSULIN (HCC): ICD-10-CM

## 2020-06-25 DIAGNOSIS — E11.29 TYPE 2 DIABETES MELLITUS WITH MICROALBUMINURIA, WITH LONG-TERM CURRENT USE OF INSULIN (HCC): ICD-10-CM

## 2020-06-25 PROCEDURE — 99397 PER PM REEVAL EST PAT 65+ YR: CPT | Performed by: FAMILY MEDICINE

## 2020-06-25 PROCEDURE — 83036 HEMOGLOBIN GLYCOSYLATED A1C: CPT | Performed by: FAMILY MEDICINE

## 2020-06-25 NOTE — PROGRESS NOTES
Patient presents with:  Diabetes: Follow Up     HPI:   Mike Hartmann is a 69year old male who presents for a complete physical exam.     Last colonoscopy:  [facility]. Dr. Lore Melo. Last 5 yrs. Done forever. Last PSA:  Good urine flow.   Used to [facility] 10:10 AM          Cholesterol  (most recent labs)   Lab Results   Component Value Date/Time    CHOLEST 129 01/15/2020 10:10 AM    HDL 45 01/15/2020 10:10 AM    LDL 65 01/15/2020 10:10 AM    TRIG 95 01/15/2020 10:10 AM        PSA (ng/mL)   Date Value   06/0 • Neomycin-Polymyxin-HC 1 % Otic Solution Apply 1 Application topically as needed. • HYDROcodone-acetaminophen 5-325 MG Oral Tab Take 1-2 tablets by mouth every 4 (four) hours as needed for Pain.  45 tablet 0   • torsemide 10 MG Oral Tab Take 1 tablet MD at [facility] MAIN OR   • WRIST CARPAL TUNNEL RELEASE Right 6/20/2018    Performed by Camilo Sparrow MD at [facility] MAIN OR      No family history on file.    Social History:  Social History    Tobacco Use      Smoking status: Former Smoker        Packs/day: 1.0 insulin ([address]  Much improved, A1C 5.6  Careful not to go too low  May need to back on of insulin some  Taking Trulicity, [place], [place] and [place] and metformin. Having occ low readings but no symptoms. Also having readings 200+.   Varies greatly    N

## 2020-06-25 NOTE — TELEPHONE ENCOUNTER
IHP PA referral updated with most recent office visit note. IHP PA pending.  Insurance to review request.   Pending referral #: [id number]    Once authorization is received will fax to [facility]

## 2020-06-25 NOTE — TELEPHONE ENCOUNTER
Dipak lamb from [employer] from [employer], [place] states pt's IHP ins is stating the order is being denied due to the order being incomplete. Pls advise.

## 2020-06-29 NOTE — TELEPHONE ENCOUNTER
Additional visits were authorized by [facility].     Updated referral faxed to [facility] fax #: [phone]

## 2020-07-06 ENCOUNTER — TELEPHONE (OUTPATIENT)
Dept: FAMILY MEDICINE CLINIC | Facility: CLINIC | Age: 75
End: 2020-07-06

## 2020-07-06 DIAGNOSIS — R80.9 TYPE 2 DIABETES MELLITUS WITH MICROALBUMINURIA, WITH LONG-TERM CURRENT USE OF INSULIN (HCC): ICD-10-CM

## 2020-07-06 DIAGNOSIS — E11.29 TYPE 2 DIABETES MELLITUS WITH MICROALBUMINURIA, WITH LONG-TERM CURRENT USE OF INSULIN (HCC): ICD-10-CM

## 2020-07-06 DIAGNOSIS — Z79.4 TYPE 2 DIABETES MELLITUS WITH MICROALBUMINURIA, WITH LONG-TERM CURRENT USE OF INSULIN (HCC): ICD-10-CM

## 2020-07-06 RX ORDER — LOSARTAN POTASSIUM 100 MG/1
100 TABLET ORAL DAILY
Qty: 90 TABLET | Refills: 1 | Status: ON HOLD | OUTPATIENT
Start: 2020-07-06 | End: 2020-10-23

## 2020-07-06 RX ORDER — ATORVASTATIN CALCIUM 10 MG/1
10 TABLET, FILM COATED ORAL DAILY
Qty: 90 TABLET | Refills: 1 | Status: SHIPPED | OUTPATIENT
Start: 2020-07-06 | End: 2021-10-04

## 2020-07-06 NOTE — TELEPHONE ENCOUNTER
Medication refilled per protocol. Requested Prescriptions     Signed Prescriptions Disp Refills   • atorvastatin 10 MG Oral Tab 90 tablet 1     Sig: Take 1 tablet (10 mg total) by mouth daily.      Authorizing Provider: Yanelis Reynolds     Ordering Use

## 2020-07-06 NOTE — TELEPHONE ENCOUNTER
Pt requesting his med for atorvastatin 10 MG Oral Tab,metFORMIN HCl 1000 MG and losartan 100 MG be sent to his [place] [facility] for 3 months (he states he has had issues before on these being sent to his local)

## 2020-07-08 PROBLEM — R29.898 RIGHT ARM WEAKNESS: Status: ACTIVE | Noted: 2020-07-08

## 2020-07-13 ENCOUNTER — TELEPHONE (OUTPATIENT)
Dept: SURGERY | Facility: CLINIC | Age: 75
End: 2020-07-13

## 2020-07-13 DIAGNOSIS — Z98.890 S/P DECOMPRESSION OF ULNAR NERVE: Primary | ICD-10-CM

## 2020-07-13 DIAGNOSIS — Z98.890 S/P CARPAL TUNNEL RELEASE: ICD-10-CM

## 2020-07-13 DIAGNOSIS — G56.23 ULNAR NEUROPATHY OF BOTH UPPER EXTREMITIES: ICD-10-CM

## 2020-07-20 ENCOUNTER — TELEPHONE (OUTPATIENT)
Dept: NEUROLOGY | Facility: CLINIC | Age: 75
End: 2020-07-20

## 2020-07-21 ENCOUNTER — TELEPHONE (OUTPATIENT)
Dept: FAMILY MEDICINE CLINIC | Facility: CLINIC | Age: 75
End: 2020-07-21

## 2020-07-21 NOTE — TELEPHONE ENCOUNTER
Patient filled out medical records release to request recent C-Scope from Brise[facility]. office.  Faxed release to [address] [place] [address] ([phone])

## 2020-07-28 NOTE — TELEPHONE ENCOUNTER
Received fax from [address] [place] [address] with patient's last Colonoscopy report, placed in Dr Stevie Ascencio.

## 2020-07-30 ENCOUNTER — APPOINTMENT (OUTPATIENT)
Dept: LAB | Age: 75
End: 2020-07-30
Attending: FAMILY MEDICINE
Payer: COMMERCIAL

## 2020-07-30 DIAGNOSIS — R80.9 TYPE 2 DIABETES MELLITUS WITH MICROALBUMINURIA, WITH LONG-TERM CURRENT USE OF INSULIN (HCC): ICD-10-CM

## 2020-07-30 DIAGNOSIS — E11.29 TYPE 2 DIABETES MELLITUS WITH MICROALBUMINURIA, WITH LONG-TERM CURRENT USE OF INSULIN (HCC): ICD-10-CM

## 2020-07-30 DIAGNOSIS — Z79.4 TYPE 2 DIABETES MELLITUS WITH MICROALBUMINURIA, WITH LONG-TERM CURRENT USE OF INSULIN (HCC): ICD-10-CM

## 2020-07-30 DIAGNOSIS — E55.9 VITAMIN D DEFICIENCY: ICD-10-CM

## 2020-07-30 DIAGNOSIS — E78.5 HYPERLIPIDEMIA LDL GOAL <100: ICD-10-CM

## 2020-07-30 LAB
ALBUMIN SERPL-MCNC: 3.4 G/DL (ref 3.4–5)
ALBUMIN/GLOB SERPL: 0.8 {RATIO} (ref 1–2)
ALP LIVER SERPL-CCNC: 76 U/L (ref 45–117)
ALT SERPL-CCNC: 24 U/L (ref 16–61)
ANION GAP SERPL CALC-SCNC: 0 MMOL/L (ref 0–18)
AST SERPL-CCNC: 24 U/L (ref 15–37)
BILIRUB SERPL-MCNC: 0.6 MG/DL (ref 0.1–2)
BUN BLD-MCNC: 17 MG/DL (ref 7–18)
BUN/CREAT SERPL: 21 (ref 10–20)
CALCIUM BLD-MCNC: 8.7 MG/DL (ref 8.5–10.1)
CHLORIDE SERPL-SCNC: 106 MMOL/L (ref 98–112)
CHOLEST SMN-MCNC: 127 MG/DL (ref ?–200)
CO2 SERPL-SCNC: 30 MMOL/L (ref 21–32)
CREAT BLD-MCNC: 0.81 MG/DL (ref 0.7–1.3)
GLOBULIN PLAS-MCNC: 4.3 G/DL (ref 2.8–4.4)
GLUCOSE BLD-MCNC: 96 MG/DL (ref 70–99)
HDLC SERPL-MCNC: 39 MG/DL (ref 40–59)
LDLC SERPL CALC-MCNC: 66 MG/DL (ref ?–100)
M PROTEIN MFR SERPL ELPH: 7.7 G/DL (ref 6.4–8.2)
NONHDLC SERPL-MCNC: 88 MG/DL (ref ?–130)
OSMOLALITY SERPL CALC.SUM OF ELEC: 283 MOSM/KG (ref 275–295)
PATIENT FASTING Y/N/NP: YES
PATIENT FASTING Y/N/NP: YES
POTASSIUM SERPL-SCNC: 4.3 MMOL/L (ref 3.5–5.1)
SODIUM SERPL-SCNC: 136 MMOL/L (ref 136–145)
TRIGL SERPL-MCNC: 111 MG/DL (ref 30–149)
VIT D+METAB SERPL-MCNC: 44.2 NG/ML (ref 30–100)
VLDLC SERPL CALC-MCNC: 22 MG/DL (ref 0–30)

## 2020-07-30 PROCEDURE — 36415 COLL VENOUS BLD VENIPUNCTURE: CPT

## 2020-07-30 PROCEDURE — 80053 COMPREHEN METABOLIC PANEL: CPT

## 2020-07-30 PROCEDURE — 82306 VITAMIN D 25 HYDROXY: CPT

## 2020-07-30 PROCEDURE — 80061 LIPID PANEL: CPT

## 2020-08-17 ENCOUNTER — TELEPHONE (OUTPATIENT)
Dept: SURGERY | Facility: CLINIC | Age: 75
End: 2020-08-17

## 2020-08-25 ENCOUNTER — TELEPHONE (OUTPATIENT)
Dept: FAMILY MEDICINE CLINIC | Facility: CLINIC | Age: 75
End: 2020-08-25

## 2020-08-25 DIAGNOSIS — R80.9 TYPE 2 DIABETES MELLITUS WITH MICROALBUMINURIA, WITH LONG-TERM CURRENT USE OF INSULIN (HCC): Primary | ICD-10-CM

## 2020-08-25 DIAGNOSIS — E11.29 TYPE 2 DIABETES MELLITUS WITH MICROALBUMINURIA, WITH LONG-TERM CURRENT USE OF INSULIN (HCC): Primary | ICD-10-CM

## 2020-08-25 DIAGNOSIS — Z79.4 TYPE 2 DIABETES MELLITUS WITH MICROALBUMINURIA, WITH LONG-TERM CURRENT USE OF INSULIN (HCC): Primary | ICD-10-CM

## 2020-08-25 NOTE — TELEPHONE ENCOUNTER
Medication refilled per protocol. Requested Prescriptions     Signed Prescriptions Disp Refills   • dapagliflozin Propanediol (FARXIGA) 10 MG Oral Tab 90 tablet 1     Sig: Take 1 tablet (10 mg total) by mouth daily.      Authorizing Provider: João Lozoya

## 2020-08-25 NOTE — TELEPHONE ENCOUNTER
Patient is calling he said that [employer] has sent a request for Farxiga 10 mg. He is out of it and needs it sent right away he is running short may 2-3 days left.

## 2020-09-21 ENCOUNTER — OFFICE VISIT (OUTPATIENT)
Dept: FAMILY MEDICINE CLINIC | Facility: CLINIC | Age: 75
End: 2020-09-21

## 2020-09-21 VITALS
TEMPERATURE: 98 F | SYSTOLIC BLOOD PRESSURE: 132 MMHG | HEART RATE: 80 BPM | WEIGHT: 189.81 LBS | DIASTOLIC BLOOD PRESSURE: 66 MMHG | HEIGHT: 64 IN | BODY MASS INDEX: 32.41 KG/M2 | RESPIRATION RATE: 20 BRPM

## 2020-09-21 DIAGNOSIS — M62.542 ATROPHY OF LEFT HAND MUSCLES: Primary | ICD-10-CM

## 2020-09-21 DIAGNOSIS — M47.812 CERVICAL ARTHRITIS: ICD-10-CM

## 2020-09-21 DIAGNOSIS — R29.898 RIGHT HAND WEAKNESS: ICD-10-CM

## 2020-09-21 DIAGNOSIS — R29.898 RIGHT ARM WEAKNESS: ICD-10-CM

## 2020-09-21 DIAGNOSIS — Z79.4 TYPE 2 DIABETES MELLITUS WITH MICROALBUMINURIA, WITH LONG-TERM CURRENT USE OF INSULIN (HCC): ICD-10-CM

## 2020-09-21 DIAGNOSIS — R80.9 TYPE 2 DIABETES MELLITUS WITH MICROALBUMINURIA, WITH LONG-TERM CURRENT USE OF INSULIN (HCC): ICD-10-CM

## 2020-09-21 DIAGNOSIS — E11.29 TYPE 2 DIABETES MELLITUS WITH MICROALBUMINURIA, WITH LONG-TERM CURRENT USE OF INSULIN (HCC): ICD-10-CM

## 2020-09-21 DIAGNOSIS — M50.90 CERVICAL NECK PAIN WITH EVIDENCE OF DISC DISEASE: ICD-10-CM

## 2020-09-21 PROCEDURE — 3075F SYST BP GE 130 - 139MM HG: CPT | Performed by: FAMILY MEDICINE

## 2020-09-21 PROCEDURE — 99214 OFFICE O/P EST MOD 30 MIN: CPT | Performed by: FAMILY MEDICINE

## 2020-09-21 PROCEDURE — 3078F DIAST BP <80 MM HG: CPT | Performed by: FAMILY MEDICINE

## 2020-09-21 PROCEDURE — 3008F BODY MASS INDEX DOCD: CPT | Performed by: FAMILY MEDICINE

## 2020-09-21 RX ORDER — DULAGLUTIDE 1.5 MG/.5ML
1.5 INJECTION, SOLUTION SUBCUTANEOUS WEEKLY
Qty: 12 PEN | Refills: 3 | Status: SHIPPED | OUTPATIENT
Start: 2020-09-21 | End: 2021-09-16

## 2020-09-21 NOTE — PROGRESS NOTES
Patient presents with:  Neck Pain  Back Pain: Mid back, along spine  Weakness: Right hand     HPI:   Terrell Linares is a 77year old male who presents to the office for persisnet neck pains.   Feels weakness - like he can't keep his head up and it drops no muscle strength. Extremities - muscle wasting. R hand edema. ASSESSMENT AND PLAN:     Levell Donnie was seen in the office today:  had concerns including Neck Pain, Back Pain (Mid back, along spine), and Weakness (Right hand).     1. Atrophy o

## 2020-09-30 RX ORDER — MELOXICAM 7.5 MG/1
7.5 TABLET ORAL
Qty: 90 TABLET | Refills: 0 | Status: ON HOLD | OUTPATIENT
Start: 2020-09-30 | End: 2020-10-23

## 2020-10-12 ENCOUNTER — TELEPHONE (OUTPATIENT)
Dept: FAMILY MEDICINE CLINIC | Facility: CLINIC | Age: 75
End: 2020-10-12

## 2020-10-12 DIAGNOSIS — E11.8 DIABETIC COMPLICATION (HCC): Primary | ICD-10-CM

## 2020-10-14 NOTE — TELEPHONE ENCOUNTER
Referral request KATHLEEN Jacobs  Fax number: [phone]    Patient seen by Dr. Namrata Munson M.D.9/21/2020  Office notes from Dr. Trevor Hay M.D. 9/21/2020  DM - using CGM. The machine estimates his A1C at around 6.2 or so.        x 360 units  [id number]

## 2020-10-18 ENCOUNTER — HOSPITAL ENCOUNTER (INPATIENT)
Facility: HOSPITAL | Age: 75
LOS: 3 days | Discharge: HOME HEALTH | DRG: 871 | End: 2020-10-23
Attending: EMERGENCY MEDICINE | Admitting: HOSPITALIST
Payer: COMMERCIAL

## 2020-10-18 ENCOUNTER — APPOINTMENT (OUTPATIENT)
Dept: GENERAL RADIOLOGY | Facility: HOSPITAL | Age: 75
DRG: 871 | End: 2020-10-18
Attending: EMERGENCY MEDICINE
Payer: COMMERCIAL

## 2020-10-18 DIAGNOSIS — N12 PYELONEPHRITIS: Primary | ICD-10-CM

## 2020-10-18 PROBLEM — D72.829 LEUKOCYTOSIS: Status: ACTIVE | Noted: 2020-10-18

## 2020-10-18 PROBLEM — N39.0 UTI (URINARY TRACT INFECTION): Status: ACTIVE | Noted: 2020-10-18

## 2020-10-18 PROCEDURE — 71045 X-RAY EXAM CHEST 1 VIEW: CPT | Performed by: EMERGENCY MEDICINE

## 2020-10-18 PROCEDURE — 99220 INITIAL OBSERVATION CARE,LEVL III: CPT | Performed by: HOSPITALIST

## 2020-10-18 RX ORDER — SODIUM CHLORIDE 9 MG/ML
INJECTION, SOLUTION INTRAVENOUS CONTINUOUS
Status: DISCONTINUED | OUTPATIENT
Start: 2020-10-18 | End: 2020-10-19

## 2020-10-18 RX ORDER — ACETAMINOPHEN 500 MG
1000 TABLET ORAL ONCE
Status: COMPLETED | OUTPATIENT
Start: 2020-10-18 | End: 2020-10-18

## 2020-10-18 RX ORDER — CARVEDILOL 3.12 MG/1
3.12 TABLET ORAL DAILY
Status: DISCONTINUED | OUTPATIENT
Start: 2020-10-18 | End: 2020-10-19

## 2020-10-18 RX ORDER — POLYETHYLENE GLYCOL 3350 17 G/17G
17 POWDER, FOR SOLUTION ORAL DAILY PRN
Status: DISCONTINUED | OUTPATIENT
Start: 2020-10-18 | End: 2020-10-23

## 2020-10-18 RX ORDER — BISACODYL 10 MG
10 SUPPOSITORY, RECTAL RECTAL
Status: DISCONTINUED | OUTPATIENT
Start: 2020-10-18 | End: 2020-10-23

## 2020-10-18 RX ORDER — METOCLOPRAMIDE HYDROCHLORIDE 5 MG/ML
10 INJECTION INTRAMUSCULAR; INTRAVENOUS EVERY 8 HOURS PRN
Status: DISCONTINUED | OUTPATIENT
Start: 2020-10-18 | End: 2020-10-23

## 2020-10-18 RX ORDER — ACETAMINOPHEN 500 MG
TABLET ORAL
Status: DISPENSED
Start: 2020-10-18 | End: 2020-10-19

## 2020-10-18 RX ORDER — ATORVASTATIN CALCIUM 10 MG/1
10 TABLET, FILM COATED ORAL DAILY
Status: DISCONTINUED | OUTPATIENT
Start: 2020-10-19 | End: 2020-10-23

## 2020-10-18 RX ORDER — HYDROCODONE BITARTRATE AND ACETAMINOPHEN 5; 325 MG/1; MG/1
1-2 TABLET ORAL EVERY 4 HOURS PRN
Status: DISCONTINUED | OUTPATIENT
Start: 2020-10-18 | End: 2020-10-18

## 2020-10-18 RX ORDER — DEXTROSE MONOHYDRATE 25 G/50ML
50 INJECTION, SOLUTION INTRAVENOUS
Status: DISCONTINUED | OUTPATIENT
Start: 2020-10-18 | End: 2020-10-23

## 2020-10-18 RX ORDER — SODIUM CHLORIDE 9 MG/ML
1000 INJECTION, SOLUTION INTRAVENOUS ONCE
Status: COMPLETED | OUTPATIENT
Start: 2020-10-18 | End: 2020-10-18

## 2020-10-18 RX ORDER — HYDROCODONE BITARTRATE AND ACETAMINOPHEN 5; 325 MG/1; MG/1
1 TABLET ORAL EVERY 4 HOURS PRN
Status: DISCONTINUED | OUTPATIENT
Start: 2020-10-18 | End: 2020-10-23

## 2020-10-18 RX ORDER — SODIUM CHLORIDE 9 MG/ML
INJECTION, SOLUTION INTRAVENOUS CONTINUOUS
Status: ACTIVE | OUTPATIENT
Start: 2020-10-18 | End: 2020-10-18

## 2020-10-18 RX ORDER — HYDROCODONE BITARTRATE AND ACETAMINOPHEN 5; 325 MG/1; MG/1
2 TABLET ORAL EVERY 4 HOURS PRN
Status: DISCONTINUED | OUTPATIENT
Start: 2020-10-18 | End: 2020-10-23

## 2020-10-18 RX ORDER — ENOXAPARIN SODIUM 100 MG/ML
40 INJECTION SUBCUTANEOUS DAILY
Status: DISCONTINUED | OUTPATIENT
Start: 2020-10-18 | End: 2020-10-21

## 2020-10-18 RX ORDER — HYDROMORPHONE HYDROCHLORIDE 1 MG/ML
0.5 INJECTION, SOLUTION INTRAMUSCULAR; INTRAVENOUS; SUBCUTANEOUS EVERY 30 MIN PRN
Status: ACTIVE | OUTPATIENT
Start: 2020-10-18 | End: 2020-10-18

## 2020-10-18 RX ORDER — ONDANSETRON 2 MG/ML
4 INJECTION INTRAMUSCULAR; INTRAVENOUS EVERY 4 HOURS PRN
Status: DISCONTINUED | OUTPATIENT
Start: 2020-10-18 | End: 2020-10-18

## 2020-10-18 RX ORDER — ACETAMINOPHEN 325 MG/1
650 TABLET ORAL EVERY 6 HOURS PRN
Status: DISCONTINUED | OUTPATIENT
Start: 2020-10-18 | End: 2020-10-19

## 2020-10-18 RX ORDER — SODIUM PHOSPHATE, DIBASIC AND SODIUM PHOSPHATE, MONOBASIC 7; 19 G/133ML; G/133ML
1 ENEMA RECTAL ONCE AS NEEDED
Status: DISCONTINUED | OUTPATIENT
Start: 2020-10-18 | End: 2020-10-23

## 2020-10-18 RX ORDER — ASPIRIN 81 MG/1
81 TABLET, CHEWABLE ORAL DAILY
Status: DISCONTINUED | OUTPATIENT
Start: 2020-10-18 | End: 2020-10-23

## 2020-10-18 RX ORDER — ONDANSETRON 2 MG/ML
4 INJECTION INTRAMUSCULAR; INTRAVENOUS EVERY 6 HOURS PRN
Status: DISCONTINUED | OUTPATIENT
Start: 2020-10-18 | End: 2020-10-23

## 2020-10-18 NOTE — H&P
[facility]  History and Physical     Tariq Samano Patient Status:  Emergency    1945 MRN [id number]   [facility] Attending Michelle Chávez MD   [facility] Day # 0 PCP Enrique Morgan MD     Chief Compla ULNAR NERVE TRANSPOSITION. WRIST CARPAL TUNNEL RELEASE.     • ULNAR NERVE DECOMPRESSION Left 2/14/2020    Performed by Camilo Sparrow MD at [facility] MAIN OR   • Feroz Gray 92 Right 6/12/2019    Performed by Camilo Sparrow MD at [address] [place] [address] (NOVOLOG) 100 UNIT/ML Subcutaneous Solution, Inject 20 Units into the skin 2 (two) times daily. , Disp: 36 mL, Rfl: 1    •  carvedilol (COREG) 3.125 MG Oral Tab, Take 1 tablet by mouth daily. , Disp: , Rfl:     •  ergocalciferol 1.25 MG (12300 UT) Oral Cap, distress. Alert and oriented x 3. Ill-appearing  HEENT: Normocephalic, atraumatic. EOM-I. Anicteric. Neck: No lymphadenopathy. No JVD. No carotid bruits. Respiratory: Good inspiratory effort. Clear to auscultation bilaterally. No rhonchi.   Doylene Ruts patient    Mayi Francois MD

## 2020-10-18 NOTE — ED INITIAL ASSESSMENT (HPI)
Pt presented to ED via EMS from home c/o urinating every 15 min states can't hold water. Pt also noted to have a fever of 102.1 in field on arrival temp 99.6.

## 2020-10-19 ENCOUNTER — APPOINTMENT (OUTPATIENT)
Dept: CT IMAGING | Facility: HOSPITAL | Age: 75
DRG: 871 | End: 2020-10-19
Attending: INTERNAL MEDICINE
Payer: COMMERCIAL

## 2020-10-19 PROBLEM — E11.9 DIABETES MELLITUS (HCC): Status: ACTIVE | Noted: 2020-03-12

## 2020-10-19 PROCEDURE — 74177 CT ABD & PELVIS W/CONTRAST: CPT | Performed by: INTERNAL MEDICINE

## 2020-10-19 PROCEDURE — 99225 SUBSEQUENT OBSERVATION CARE: CPT | Performed by: INTERNAL MEDICINE

## 2020-10-19 RX ORDER — SODIUM CHLORIDE, SODIUM LACTATE, POTASSIUM CHLORIDE, CALCIUM CHLORIDE 600; 310; 30; 20 MG/100ML; MG/100ML; MG/100ML; MG/100ML
INJECTION, SOLUTION INTRAVENOUS CONTINUOUS
Status: DISCONTINUED | OUTPATIENT
Start: 2020-10-19 | End: 2020-10-20

## 2020-10-19 RX ORDER — PHENAZOPYRIDINE HYDROCHLORIDE 100 MG/1
100 TABLET, FILM COATED ORAL
Status: COMPLETED | OUTPATIENT
Start: 2020-10-19 | End: 2020-10-21

## 2020-10-19 RX ORDER — SENNOSIDES 8.6 MG
8.6 TABLET ORAL 2 TIMES DAILY
Status: DISCONTINUED | OUTPATIENT
Start: 2020-10-19 | End: 2020-10-23

## 2020-10-19 RX ORDER — ACETAMINOPHEN 500 MG
1000 TABLET ORAL EVERY 6 HOURS PRN
Status: DISCONTINUED | OUTPATIENT
Start: 2020-10-19 | End: 2020-10-23

## 2020-10-19 RX ORDER — POTASSIUM CHLORIDE 20 MEQ/1
40 TABLET, EXTENDED RELEASE ORAL EVERY 4 HOURS
Status: COMPLETED | OUTPATIENT
Start: 2020-10-19 | End: 2020-10-19

## 2020-10-19 RX ORDER — POLYETHYLENE GLYCOL 3350 17 G/17G
17 POWDER, FOR SOLUTION ORAL DAILY
Status: DISCONTINUED | OUTPATIENT
Start: 2020-10-19 | End: 2020-10-23

## 2020-10-19 RX ORDER — IBUPROFEN 600 MG/1
600 TABLET ORAL EVERY 6 HOURS PRN
Status: DISCONTINUED | OUTPATIENT
Start: 2020-10-19 | End: 2020-10-23

## 2020-10-19 RX ORDER — MELATONIN
3 NIGHTLY PRN
Status: DISCONTINUED | OUTPATIENT
Start: 2020-10-19 | End: 2020-10-23

## 2020-10-19 NOTE — PROGRESS NOTES
Assumed pt care @ 0730. Fever this AM. 102F, hospitalist made aware. Stat BC x 2 performed. Pt needs assistance with meal, pt unable to move right upper arm. Limited movement to left upper arm.   Pt needs one to two assist for transfer from bed to [place]

## 2020-10-19 NOTE — ED PROVIDER NOTES
Patient Seen in: [facility] Emergency Department      History   Patient presents with:  UTI    Stated Complaint: Dysuria    HPI    52-year-old male presents here to the emergency department complaining of having urinary frequency and urgency.   Woodford Carrel normoactive. No CVA tenderness. Extremities: No cyanosis, no edema or clubbing. Pulses are +2. Full range of motion is noted of the extremities without deformities. No tenderness. Neurologically intact.        ED Course     Labs Reviewed   URINALYSIS Patient presents with dysuria. FINDINGS:  Heart and mediastinum are normal.  Pulmonary hypo inflation. No acute pulmonary infiltrates are identified.   Minimal atelectasis lateral left lung base.                   =====  CONCLUSION:  No acute proce

## 2020-10-19 NOTE — PROGRESS NOTES
[facility]  Progress Note     Flori Camara Patient Status:  Observation    1945 MRN [id number]   [facility] 3NE-A Attending Suzie Rosales, [address] [facility] Day # 0 PCP Chi Hebert MD     Chief Complaint: dysuria    S of 0.85 mg/dL). No results for input(s): PTP, INR in the last 168 hours. No results for input(s): TROP, CK in the last 168 hours. Imaging: Imaging data reviewed in Epic.     Medications:   • aspirin  81 mg Oral Daily   • atorvastatin  10 mg Or

## 2020-10-19 NOTE — PLAN OF CARE
NURSING ADMISSION NOTE      Patient admitted via Cart  Oriented to room. Safety precautions initiated. Bed in low position. Call light in reach. Patient is A&O x4. Forgetful at times. RUE limited movement. VSS. On tele-NSR. On RA.   IV-0.9%

## 2020-10-20 ENCOUNTER — APPOINTMENT (OUTPATIENT)
Dept: GENERAL RADIOLOGY | Facility: HOSPITAL | Age: 75
DRG: 871 | End: 2020-10-20
Attending: INTERNAL MEDICINE
Payer: COMMERCIAL

## 2020-10-20 PROCEDURE — 71045 X-RAY EXAM CHEST 1 VIEW: CPT | Performed by: INTERNAL MEDICINE

## 2020-10-20 PROCEDURE — 99232 SBSQ HOSP IP/OBS MODERATE 35: CPT | Performed by: INTERNAL MEDICINE

## 2020-10-20 RX ORDER — TAMSULOSIN HYDROCHLORIDE 0.4 MG/1
0.4 CAPSULE ORAL DAILY
Status: DISCONTINUED | OUTPATIENT
Start: 2020-10-20 | End: 2020-10-23

## 2020-10-20 NOTE — PLAN OF CARE
Assumed care at [address] [place] [address]. Patient had fevers at the beginning of the shift and sepsis alert flagged due to fevers and respirations, notified Dr. Raheem Lyles with orders for motrin PRN and increased Tylenol dosage.  Frequent rounds and assessments, see sugare

## 2020-10-20 NOTE — PHYSICAL THERAPY NOTE
PHYSICAL THERAPY EVALUATION - INPATIENT     Room Number: 2734/7148-W  Evaluation Date: 10/20/2020  Type of Evaluation: Initial  Physician Order: PT Eval and Treat    Presenting Problem: UTI and pyelonephritis  Reason for Therapy: Mobility Dysfunction TRANSPOSITION. WRIST CARPAL TUNNEL RELEASE.     • ULNAR NERVE DECOMPRESSION Left 2/14/2020    Performed by Alber Landrum MD at [facility] MAIN OR   • Feroz Gray 92 Right 6/12/2019    Performed by Alber Landrum MD at [place] [address] STRENGTH ASSESSMENT  Upper extremity ROM and strength are-see OT eval    Lower extremity ROM is within functional limits    Lower extremity strength is : RLE hip flex 3/5, quad 3+/5, ankle 3+/5; LLE hip flex 4-/5, quad 4/5, ankle 4+/5    BALANCE  Static Si and repositioning at the EOB. Pt attempting to stand up without AD or gait belt. Gait belt donned and pt sit/stand c mod A and RW.  Pt required assist to maintain R hand placement on RW, however able to amb into the bathroom ~6' using RW and mod A c above d functional limitations in independent bed mobility, transfers, and gait. The patient is below baseline and would benefit from skilled inpatient PT to address the above deficits to assist patient in returning to prior to level of function.  At this time, LINH

## 2020-10-20 NOTE — DIETARY NOTE
[address]     Admitting diagnosis:  Pyelonephritis [N12]    Ht:  5'4\"  Wt: 86.6 kg (191 lb). This is 147 % of IBW  Body mass index is 32.79 kg/m².   IBW: 59.1 kg    Labs/Meds reviewed    Diet: Orders Placed

## 2020-10-20 NOTE — PLAN OF CARE
Patient alert and oriented x4. On RA, . NSR on tele. HR 70s. Denies pain. Generalized weakness. Increased RR, febrile. Sepsis BPA firing. MD notified. PRN tylenol, PRN motrin. Ice packs provided. Chest XR ordered. IV fluids dc'd.   IV merrem

## 2020-10-20 NOTE — PLAN OF CARE
Problem: Impaired Activities of Daily Living  Goal: Achieve highest/safest level of independence in self care  Description: Interventions:  - Assess ability and encourage patient to participate in ADLs to maximize function  - Promote sitting position [facility]

## 2020-10-20 NOTE — PROGRESS NOTES
[facility] HOSPITALIST  Progress Note     Kathleen Fitzgerald Hoa Patient Status:  Observation    1945 MRN [id number]   [facility] 3NE-A Attending Aimee Knowles, [address] [facility] Day # 0 PCP Nanda Vann MD     Chief Complaint: dysuria    S CA 8.8 8.6  --    ALB 3.6  --   --     139  --    K 3.9 3.4* 4.1    106  --    CO2 31.0 26.0  --    ALKPHO 78  --   --    AST 26  --   --    ALT 27  --   --    BILT 0.7  --   --    TP 7.9  --   --        Estimated Creatinine Clearance: 59 mL/ discontinue isolation: yes     Will the patient be referred to TCC on discharge?: no  Estimated date of discharge: 1-2 days  Discharge is dependent on: clinical state  At this point Mr. Dominic Crum is expected to be discharge to: home    Plan of care discussed

## 2020-10-20 NOTE — OCCUPATIONAL THERAPY NOTE
OCCUPATIONAL THERAPY EVALUATION - INPATIENT     Room Number: 1329/6468-K  Evaluation Date: 10/20/2020  Type of Evaluation: Initial  Presenting Problem: pyelonephritis    Physician Order: IP Consult to Occupational Therapy  Reason for Therapy: ADL/IADL Dysf MD JOSIAS at [facility] MAIN OR   • ULNAR NERVE DECOMPRESSION Right 6/20/2018    Performed by Rd Burrell MD at [facility] MAIN OR   • ULNAR NERVE TRANSPOSITION N/A 2/14/2020    Performed by Rd Burrell MD at [facility] MAIN OR   • WRIST CARPAL TUNNEL RELEASE Left 2 extremity ROM is within functional limits except for following: RUE impaired shoulder flexion less than 1/2 ROM, WNL for elbow flexion, and 1/2 ROM wrist flexion using radial wrist extensors, no ulnar wrist musculature AROM.  No AROM gross grasp    Upper ex within reach, RN and PCT aware of session and patient position. Patient End of Session: Needs met;Call light within reach;RN aware of session/findings; All patient questions and concerns addressed; In bathroom - nursing staff aware; Other (Comment)(pt mauricio eval/education; Neuromuscluar reeducation; Compensatory technique education;Continued evaluation  Rehab Potential : Good  Frequency (Obs): 3-5x/week  Number of Visits to Meet Established Goals: 3    ADL Goals   Patient will perform upper body dressing:  with

## 2020-10-21 ENCOUNTER — APPOINTMENT (OUTPATIENT)
Dept: CV DIAGNOSTICS | Facility: HOSPITAL | Age: 75
DRG: 871 | End: 2020-10-21
Attending: HOSPITALIST
Payer: COMMERCIAL

## 2020-10-21 ENCOUNTER — APPOINTMENT (OUTPATIENT)
Dept: CT IMAGING | Facility: HOSPITAL | Age: 75
DRG: 871 | End: 2020-10-21
Attending: HOSPITALIST
Payer: COMMERCIAL

## 2020-10-21 ENCOUNTER — APPOINTMENT (OUTPATIENT)
Dept: ULTRASOUND IMAGING | Facility: HOSPITAL | Age: 75
DRG: 871 | End: 2020-10-21
Attending: INTERNAL MEDICINE
Payer: COMMERCIAL

## 2020-10-21 PROCEDURE — 99291 CRITICAL CARE FIRST HOUR: CPT | Performed by: HOSPITALIST

## 2020-10-21 PROCEDURE — 99253 IP/OBS CNSLTJ NEW/EST LOW 45: CPT | Performed by: INTERNAL MEDICINE

## 2020-10-21 PROCEDURE — 93306 TTE W/DOPPLER COMPLETE: CPT | Performed by: HOSPITALIST

## 2020-10-21 PROCEDURE — 93970 EXTREMITY STUDY: CPT | Performed by: INTERNAL MEDICINE

## 2020-10-21 PROCEDURE — 99232 SBSQ HOSP IP/OBS MODERATE 35: CPT | Performed by: INTERNAL MEDICINE

## 2020-10-21 PROCEDURE — 71275 CT ANGIOGRAPHY CHEST: CPT | Performed by: HOSPITALIST

## 2020-10-21 RX ORDER — HEPARIN SODIUM AND DEXTROSE 10000; 5 [USP'U]/100ML; G/100ML
12 INJECTION INTRAVENOUS ONCE
Status: COMPLETED | OUTPATIENT
Start: 2020-10-21 | End: 2020-10-21

## 2020-10-21 RX ORDER — METOPROLOL TARTRATE 5 MG/5ML
5 INJECTION INTRAVENOUS ONCE
Status: COMPLETED | OUTPATIENT
Start: 2020-10-21 | End: 2020-10-21

## 2020-10-21 RX ORDER — HEPARIN SODIUM 5000 [USP'U]/ML
5000 INJECTION INTRAVENOUS; SUBCUTANEOUS ONCE
Status: COMPLETED | OUTPATIENT
Start: 2020-10-21 | End: 2020-10-21

## 2020-10-21 RX ORDER — HEPARIN SODIUM AND DEXTROSE 10000; 5 [USP'U]/100ML; G/100ML
INJECTION INTRAVENOUS CONTINUOUS
Status: DISCONTINUED | OUTPATIENT
Start: 2020-10-21 | End: 2020-10-22

## 2020-10-21 RX ORDER — DILTIAZEM HYDROCHLORIDE 5 MG/ML
10 INJECTION INTRAVENOUS ONCE
Status: COMPLETED | OUTPATIENT
Start: 2020-10-21 | End: 2020-10-21

## 2020-10-21 RX ORDER — DILTIAZEM HYDROCHLORIDE 5 MG/ML
INJECTION INTRAVENOUS
Status: COMPLETED
Start: 2020-10-21 | End: 2020-10-21

## 2020-10-21 RX ORDER — HEPARIN SODIUM AND DEXTROSE 10000; 5 [USP'U]/100ML; G/100ML
INJECTION INTRAVENOUS CONTINUOUS PRN
Status: DISCONTINUED | OUTPATIENT
Start: 2020-10-21 | End: 2020-10-22

## 2020-10-21 RX ORDER — ADENOSINE 3 MG/ML
INJECTION, SOLUTION INTRAVENOUS
Status: COMPLETED
Start: 2020-10-21 | End: 2020-10-21

## 2020-10-21 NOTE — PROGRESS NOTES
[facility] HOSPITALIST  Progress Note     [place] Hoa Patient Status:  Observation    1945 MRN [id number]   [facility] 3NE-A Attending Lisa Walker, [address] [facility] Day # 1 PCP Fidel Sargent MD     Chief Complaint: dysuria    S tenderness  Extremities: No edema.     Diagnostic Data:      Labs:  Recent Labs   Lab 10/18/20  1730 10/19/20  0804 10/20/20  0742 10/21/20  0210   WBC 16.7* 23.5* 15.1* 9.4   HGB 15.5 13.9 13.9 14.7   MCV 91.6 92.1 94.7 91.7   .0 242.0 159.0 142.0* D-dimer. CTA negative for PE. Possibly due to underlying infection  1. Check LE venous dopplers  5. Enlarged prostate  1. PVR to evaluate for obstruction  2. Now on flomax  3. Monitor for retention  6. Hypotension  1.  Improved w/ IVF  2. holding losartan

## 2020-10-21 NOTE — PLAN OF CARE
Assumed patient care @8295  Patient is Aox4  On room air  NSR on tele,   VSS, afebrile  Voids, urinal at bedside  Last BM 10/20 (small)  Up with one, Sb assist  PIV Left AC-SL  PIV right Forearm-SL  Accucheck QID  NOVOLOG (not on sls), and carb coverage

## 2020-10-21 NOTE — PHYSICAL THERAPY NOTE
Pt is unavailable for US to r/o DVT. Will follow up later as schedule allows, otherwise will be seen tomorrow. Nursing is aware.

## 2020-10-21 NOTE — PROGRESS NOTES
Patient complaining of SOB,  Saturations in upper 90s,  Vital stable, HR improved- now 89    Labs reviewed-  Troponin of 0.166 and D-dimer of 2.95    Already on heparin gtt- low dose for A.fib    Check CTA chest to r/o PE    Lauren Wang M.D.   [employer]

## 2020-10-21 NOTE — CONSULTS
[facility]    Report of Consultation    Derrell Camara Patient Status:  Inpatient    1945 MRN [id number]   [facility] 3NE-A Attending Nikki Baeza, [address] [facility] Day # 1 PCP Suzi Garber MD     Date of Admission:  10/18/2020 fibrillation. Patient was started on Cardizem.     Past Medical History        Past Medical History:   Diagnosis Date   • Calculus of kidney    • Cataract    • Diabetes ([address]    • High blood pressure    • High cholesterol    • Visual impairment     glasses , ,     •  dilTIAZem HCl (CARDIZEM) injection 10 mg, 10 mg, Intravenous, Once    •  DILTIAZEM BOLUS FROM BAG 10 mg infusion, 10 mg, Intravenous, Q1H PRN    •  diltiazem 100mg/100ml in NaCl (CARDIZEM) 1 mg/mL premix/add-vantage, 2.5-20 mg/hr, Intravenous, C ondansetron HCl (ZOFRAN) injection 4 mg, 4 mg, Intravenous, Q6H PRN    •  Metoclopramide HCl (REGLAN) injection 10 mg, 10 mg, Intravenous, Q8H PRN    •  Insulin Aspart Pen (NOVOLOG) 100 UNIT/ML flexpen 1-10 Units, 1-10 Units, Subcutaneous, TID AC and HS needed. •  HYDROcodone-acetaminophen 5-325 MG Oral Tab, Take 1-2 tablets by mouth every 4 (four) hours as needed for Pain.     •  Glucose Blood (GRECIA CONTOUR NEXT TEST) In Vitro Strip, TEST TID    •  Hydrocortisone-Acetic Acid 1-2 % Otic Solution, as ne excursions and effort. Abdomen:  Soft, non-tender. No organosplenomegally, mass or rebound, BS-present. Extremities:  Without clubbing, cyanosis, trace edema of the hands peripheral pulses are 2+. Neurologic:  Alert and oriented, normal affect.   Neuropa

## 2020-10-21 NOTE — PLAN OF CARE
Assumed care of pt around 0715  Pt A&O x4   weaned to RA  VSS, afebrile   Denies any pain at this time  Pt observed resting in bed and sitting in chair with family at bedside   Finished course of pyridium, reports sense of burning with urination but bet activity level and precautions during self-care    Outcome: Progressing     Problem: Impaired Functional Mobility  Goal: Achieve highest/safest level of mobility/gait  Description: Interventions:  - Assess patient's functional ability and stability  - Prom

## 2020-10-21 NOTE — PLAN OF CARE
Assumed care at [phone]. Pt is A&O x4 and appears less fatigue in comparison to yesterday night. Vital signs stable and no fevers since this afternoon, rechecking vitals frequently. BP and HR stable.  Patient ambulated to bathroom with no issues and patient st

## 2020-10-21 NOTE — PROGRESS NOTES
[facility]  RAPID RESPONSE NOTE     Yaritza Jones Patient Status:  Inpatient    1945 MRN [id number]   [facility] 3NE-A Attending Sukumar Mercado, [address] [facility] Day # 1 PCP Swati Davey MD     Reason for RRT: SVT    Physical

## 2020-10-21 NOTE — CM/SW NOTE
2:38pm  MSW attempted to see patient to discuss PT rec and post dc needs. Pt is off unit for an Ultrasound. MSW will continue to follow.

## 2020-10-22 ENCOUNTER — APPOINTMENT (OUTPATIENT)
Dept: GENERAL RADIOLOGY | Facility: HOSPITAL | Age: 75
DRG: 871 | End: 2020-10-22
Attending: HOSPITALIST
Payer: COMMERCIAL

## 2020-10-22 PROCEDURE — 99232 SBSQ HOSP IP/OBS MODERATE 35: CPT | Performed by: INTERNAL MEDICINE

## 2020-10-22 PROCEDURE — 05HA33Z INSERTION OF INFUSION DEVICE INTO LEFT BRACHIAL VEIN, PERCUTANEOUS APPROACH: ICD-10-PCS | Performed by: INTERNAL MEDICINE

## 2020-10-22 PROCEDURE — 99253 IP/OBS CNSLTJ NEW/EST LOW 45: CPT | Performed by: INTERNAL MEDICINE

## 2020-10-22 PROCEDURE — 71045 X-RAY EXAM CHEST 1 VIEW: CPT | Performed by: HOSPITALIST

## 2020-10-22 RX ORDER — MAGNESIUM CARB/ALUMINUM HYDROX 105-160MG
296 TABLET,CHEWABLE ORAL ONCE
Status: COMPLETED | OUTPATIENT
Start: 2020-10-22 | End: 2020-10-22

## 2020-10-22 RX ORDER — IPRATROPIUM BROMIDE AND ALBUTEROL SULFATE 2.5; .5 MG/3ML; MG/3ML
3 SOLUTION RESPIRATORY (INHALATION) EVERY 4 HOURS PRN
Status: DISCONTINUED | OUTPATIENT
Start: 2020-10-22 | End: 2020-10-23

## 2020-10-22 RX ORDER — FUROSEMIDE 10 MG/ML
20 INJECTION INTRAMUSCULAR; INTRAVENOUS ONCE
Status: COMPLETED | OUTPATIENT
Start: 2020-10-22 | End: 2020-10-22

## 2020-10-22 NOTE — PROGRESS NOTES
[facility]  Cardiology Progress Note    Subjective:  No chest pain or shortness of breath.     Objective:  /57   Pulse 96   Temp 99 °F (37.2 °C) (Oral)   Resp 20   Wt 191 lb (86.6 kg)   SpO2 94%   BMI 32.79 kg/m²     Telemetry: aflutter    Lab Re

## 2020-10-22 NOTE — PLAN OF CARE
Assumed care of patient at [facility]. Patient A&Ox4. On RA no during the day; . In A Fib/A Flutter on tele, rates from 80's-110's. Beta blocker added by cardiology for this evening. Worked with PTA. Ambulated in hallway. Up to chair for meals.  [place] [facility]  Goal: Achieve highest/safest level of independence in self care  Description: Interventions:  - Assess ability and encourage patient to participate in ADLs to maximize function  - Promote sitting position while performing ADLs such as feeding, groo

## 2020-10-22 NOTE — PLAN OF CARE
Assumed care at [address] [facility]. Pt is A&O x4. On RA, . 02 sats WNL. Lung sounds clear. A flutter and A fib on tele, HR ranging b/w 90s - 115, HR in the 130s -150s when ambulating. Cardizem drip infusing at 10 ml/hr.    No fevers overnight, frequent monitoring Home Suture Removal Text: Patient was provided instructions on removing sutures and will remove their sutures at home.  If they have any questions or difficulties they will call the office.

## 2020-10-22 NOTE — PROGRESS NOTES
[facility]PITAL[facility]  Progress Note     [place] [facility] Hoa Patient Status:  Observation    1945 MRN [id number]   [facility] 3NE-A Attending Suad Cartagena, [address] [facility] Day # 2 PCP Roshan Zamora MD     Chief Complaint: dysuria    S extremities bilaterally  Extremities: No edema.     Diagnostic Data:      Labs:  Recent Labs   Lab 10/18/20  1730 10/19/20  0804 10/20/20  0742 10/21/20  0210 10/21/20  0917   WBC 16.7* 23.5* 15.1* 9.4 6.2   HGB 15.5 13.9 13.9 14.7 13.3   MCV 91.6 92.1 94.7 reviewed  5. Check TSH  3. Elevated troponin likely due to above. Patient denies any chest pain  1. 2D echo reviewed  2. Cards on consult  3. Eventual stress test? Inpatient vs outpatient  4.  Mild SOB with crackles on lung exam possibly from episode of isela

## 2020-10-22 NOTE — CONSULTS
[facility]  Report of Consultation    Ania Camara Patient Status:  Inpatient    1945 MRN [id number]   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   • OTHER      kidney stone extraction   • OTHER      ingrown toe nail right big toe   • OTHER      multiple dental surgeries   • OTHER  06/20/2018    RIGHT heparin (PORCINE) drip 76330jklhg/250mL infusion CONTINUOUS, 200-3,000 Units/hr, Intravenous, Continuous  •  cefTRIAXone Sodium (ROCEPHIN) 1 g in sodium chloride 0.9% 100 mL MBP/ADD-vantage, 1 g, Intravenous, Q24H  •  insulin detemir (LEVEMIR) 100 UNIT/ML HYDROcodone-acetaminophen (NORCO) 5-325 MG per tab 1 tablet, 1 tablet, Oral, Q4H PRN **OR** HYDROcodone-acetaminophen (NORCO) 5-325 MG per tab 2 tablet, 2 tablet, Oral, Q4H PRN    Review of Systems:  All systems were reviewed and are negative except as tameka

## 2020-10-22 NOTE — CM/SW NOTE
3:36pm  DC PLAN  Home with spouse and Residential C. 12:55pm  MSW called pt's spouse, she could not talk long, she states she is in the car. She does not want VALENTE. [facility]  [facility] to [facility] to screen patient.

## 2020-10-22 NOTE — HOME CARE LIAISON
MET WITH PTNT AND OFFERED CHOICE  OF AGENCIES. PTNT AGREEABLE TO [facility]. MET WITH PTNT TO DISCUSS HOME HEALTH SERVICES AND COVERAGE CRITERIA. PTNT AGREEABLE TO [facility]. PTNT GIVEN RESIDENTIAL BROCHURE.  RESIDENTIAL WITH PROVIDE SN/PT ON DI

## 2020-10-22 NOTE — PHYSICAL THERAPY NOTE
PHYSICAL THERAPY TREATMENT NOTE - INPATIENT    Room Number: 1922/5394-C     Session: 1   Number of Visits to Meet Established Goals: 5    Presenting Problem: UTI and pyelonephritis  History related to current admission: Pt was admitted from home on 10/18/ RELEASE.     • ULNAR NERVE DECOMPRESSION Left 2/14/2020    Performed by Eri Kuo MD at [facility] MAIN OR   • Feroz Thomas Right 6/12/2019    Performed by Eri Kuo MD at [facility] MAIN OR   • Feroz Thomas Right 6/20/2018 (G-Code): CK    FUNCTIONAL ABILITY STATUS  Gait Assessment   Gait Assistance: Minimum assistance  Distance (ft): 170  Assistive Device: Rolling walker  Pattern: (L foot; R foot; narrow PRECIOUS,LOB to left and R)  Stoop/Curb Assistance: Not tested       Skilled today per pt's request regarding dc recommendation because wife works full time and pt needs cga for safety during mobility. Wife states she will arrange family members to assist pt while she is at work. Prefers HHPT.        DISCHARGE RECOMMENDATIONS  PT Mirna Proctor

## 2020-10-23 VITALS
WEIGHT: 191 LBS | OXYGEN SATURATION: 98 % | RESPIRATION RATE: 19 BRPM | BODY MASS INDEX: 33 KG/M2 | TEMPERATURE: 99 F | DIASTOLIC BLOOD PRESSURE: 56 MMHG | HEART RATE: 70 BPM | SYSTOLIC BLOOD PRESSURE: 100 MMHG

## 2020-10-23 PROCEDURE — 92960 CARDIOVERSION ELECTRIC EXT: CPT | Performed by: INTERNAL MEDICINE

## 2020-10-23 PROCEDURE — 99232 SBSQ HOSP IP/OBS MODERATE 35: CPT | Performed by: INTERNAL MEDICINE

## 2020-10-23 PROCEDURE — 5A2204Z RESTORATION OF CARDIAC RHYTHM, SINGLE: ICD-10-PCS | Performed by: INTERNAL MEDICINE

## 2020-10-23 PROCEDURE — 99239 HOSP IP/OBS DSCHRG MGMT >30: CPT | Performed by: INTERNAL MEDICINE

## 2020-10-23 RX ORDER — METOPROLOL TARTRATE 50 MG/1
50 TABLET, FILM COATED ORAL
Qty: 60 TABLET | Refills: 1 | Status: SHIPPED | OUTPATIENT
Start: 2020-10-23 | End: 2020-10-23

## 2020-10-23 RX ORDER — CIPROFLOXACIN 500 MG/1
500 TABLET, FILM COATED ORAL 2 TIMES DAILY
Qty: 14 TABLET | Refills: 0 | Status: SHIPPED | OUTPATIENT
Start: 2020-10-23 | End: 2020-10-30

## 2020-10-23 RX ORDER — METOPROLOL TARTRATE 50 MG/1
50 TABLET, FILM COATED ORAL
Qty: 60 TABLET | Refills: 1 | Status: SHIPPED | OUTPATIENT
Start: 2020-10-23 | End: 2021-06-17

## 2020-10-23 RX ORDER — CEFDINIR 300 MG/1
300 CAPSULE ORAL 2 TIMES DAILY
Qty: 16 CAPSULE | Refills: 0 | Status: SHIPPED | OUTPATIENT
Start: 2020-10-24 | End: 2020-10-23

## 2020-10-23 RX ORDER — CIPROFLOXACIN 500 MG/1
500 TABLET, FILM COATED ORAL EVERY 12 HOURS SCHEDULED
Status: DISCONTINUED | OUTPATIENT
Start: 2020-10-23 | End: 2020-10-23

## 2020-10-23 NOTE — PLAN OF CARE
Pt is A&O x4. VSS- Aflutter/afib on tele with HR in 90's-low 100's. Pt tachy with activity in 120's. Cardizem gtt infusing as ordered, unable to wean infusion overnight. Scheduled metoprolol given as ordered. Pt denies pain/nausea/dizziness.  SpO2 remains s Achieve highest/safest level of independence in self care  Description: Interventions:  - Assess ability and encourage patient to participate in ADLs to maximize function  - Promote sitting position while performing ADLs such as feeding, grooming, and bath

## 2020-10-23 NOTE — PROGRESS NOTES
Successful cardioversion at bedside with Dr Nery Milan. Pt converted to NSR confirmed by EKG. Pt tolerated procedure well, see bedside sedation record. Neuro intact. Pt unable to move right hand as previous, otherwise all other extremities moving well.  Pt cale

## 2020-10-23 NOTE — PROCEDURES
Cardioversion Procedure Note:    PROCEDURE:   1) External direct current cardioversion  2) Administration of IV conscious sedation    DATE OF PROCEDURE: 16/97/97    COMPLICATIONS: None     Physician: Jose J Carlinl Donnie is a 77year old

## 2020-10-23 NOTE — PROGRESS NOTES
[facility]    Electrophysiology Follow Up Note      Uriel Aid Hoa Patient Status:  Inpatient    1945 MRN [id number]   [facility] 3NE-A Attending Nicole Mondragon, [address] [facility] Day # 3 PCP Conception MD Hawk Almaraz 10 mg, Oral, Daily  •  glucose (DEX4) oral liquid 15 g, 15 g, Oral, Q15 Min PRN **OR** Glucose-Vitamin C (DEX-4) chewable tab 4 tablet, 4 tablet, Oral, Q15 Min PRN **OR** dextrose 50 % injection 50 mL, 50 mL, Intravenous, Q15 Min PRN **OR** glucose (DEX4) perfused, normal cap refill, no edema  Skin: Warm and dry.        Labs:     Recent Labs   Lab 10/19/20  0804 10/19/20  1739 10/21/20  0210 10/21/20  0917   GLU 98  --  129* 103*   BUN 14  --  17 16   CREATSERUM 0.92  --  0.70 0.67*   GFRAA 94  --  107 109 Neuropathy     Diabetes mellitus (HCC)     Hyperlipidemia LDL goal <100     Right arm weakness     Sepsis ([address]     Urinary tract infection without hematuria     Essential hypertension     Leukocytosis     Pyelonephritis     UTI (urinary tract infection)

## 2020-10-24 NOTE — PROGRESS NOTES
NURSING DISCHARGE NOTE    Discharged Home via Wheelchair. Accompanied by Spouse and Support staff  Belongings Taken by patient/family. IV site x2 discontinued. Midline discontinued. Flu vaccine to right deltoid.  Discharge instructions, follow up, a

## 2020-10-24 NOTE — DISCHARGE SUMMARY
[facility] HOSPITAL[facility]  DISCHARGE SUMMARY     [facility] Patient Status:  Inpatient    1945 MRN [id number]   [facility] 3NE-A Attending Samantha Madison, [address] [facility] Day # 3 PCP Kaylynn Olmos MD     Date of Admission: 10/18/2020  Tony constipation and urinary retention. He was initially placed on Flomax with improvement. It was deemed that his urinary retention may be a result of constipation.   He was given bowel regimen including magnesium citrate and he subsequently had multiple bow Metoprolol Tartrate 50 MG Tabs  Commonly known as: LOPRESSOR      Take 1 tablet (50 mg total) by mouth 2x Daily(Beta Blocker).    Quantity: 60 tablet  Refills: 1     Rivaroxaban 20 MG Tabs  Commonly known as: Franko Luna  Start taking on: October 24, 2020 skin every morning. Quantity: 18 mL  Refills: 1     Trulicity 1.5 KE/0.8UF Sopn  Generic drug: Dulaglutide      Inject 1.5 mg into the skin once a week.    Quantity: 12 pen  Refills: 3        STOP taking these medications    dapagliflozin Propanediol 10 M intact (reports as chronic d/t neuropathy, carpal tunnel syndrome, and rotator cuff injury) No focal neurological deficits. Musculoskeletal: Moves all extremities. No calf tenderness. Extremities: No edema.  No cyanosis  --------------------------------

## 2020-10-26 ENCOUNTER — TELEPHONE (OUTPATIENT)
Dept: FAMILY MEDICINE CLINIC | Facility: CLINIC | Age: 75
End: 2020-10-26

## 2020-10-26 ENCOUNTER — PATIENT OUTREACH (OUTPATIENT)
Dept: CASE MANAGEMENT | Age: 75
End: 2020-10-26

## 2020-10-26 DIAGNOSIS — I48.92 ATRIAL FLUTTER, UNSPECIFIED TYPE (HCC): ICD-10-CM

## 2020-10-26 DIAGNOSIS — Z02.9 ENCOUNTERS FOR UNSPECIFIED ADMINISTRATIVE PURPOSE: ICD-10-CM

## 2020-10-26 DIAGNOSIS — N30.90 CYSTITIS: ICD-10-CM

## 2020-10-26 DIAGNOSIS — N12 PYELONEPHRITIS: ICD-10-CM

## 2020-10-26 DIAGNOSIS — E11.40 TYPE 2 DIABETES MELLITUS WITH DIABETIC NEUROPATHY, UNSPECIFIED WHETHER LONG TERM INSULIN USE (HCC): ICD-10-CM

## 2020-10-26 PROCEDURE — 1111F DSCHRG MED/CURRENT MED MERGE: CPT

## 2020-10-26 NOTE — TELEPHONE ENCOUNTER
Loran Kehr at [facility] called to inform Dr Pau Garduno will be seeing patient 2 times a week for 2 weeks

## 2020-10-26 NOTE — PROGRESS NOTES
Initial Post Discharge Follow Up   Discharge Date: 10/23/20  Contact Date: 10/26/2020    Consent Verification:  Assessment Completed With: Patient  HIPAA Verified?   Yes    Discharge Dx:     Cystitis/pyelonephritis  Atrial flutter with RVR  Type II NSTEM ambulating to the bathroom, etc)?  yes  • (NCM) Was patient given a different diet per AVS? no, patient reports that he is eating his regular diet      Medications:   Current Outpatient Medications   Medication Sig Dispense Refill   • Rivaroxaban 20 MG Oral medication changes noted on AVS?  yes  o If so, were these medication changes discussed with you prior to leaving the hospital? yes  • (NCM) If a new medication was prescribed:    o Was the new medication’s purpose & side effects reviewed?  yes  o Do you ha or his APN in the next 1-2 weeks. Patient states he will call.      Interventions by NCM: NCM reviewed medications, discharge instructions, S&S of infection/blood clots, patient instructed to report any new or worsening symptoms, when to call the doctor and

## 2020-10-28 ENCOUNTER — OFFICE VISIT (OUTPATIENT)
Dept: FAMILY MEDICINE CLINIC | Facility: CLINIC | Age: 75
End: 2020-10-28

## 2020-10-28 VITALS
HEART RATE: 68 BPM | BODY MASS INDEX: 31.58 KG/M2 | HEIGHT: 64 IN | TEMPERATURE: 97 F | OXYGEN SATURATION: 97 % | WEIGHT: 185 LBS | RESPIRATION RATE: 16 BRPM | DIASTOLIC BLOOD PRESSURE: 70 MMHG | SYSTOLIC BLOOD PRESSURE: 130 MMHG

## 2020-10-28 DIAGNOSIS — N12 PYELONEPHRITIS: Primary | ICD-10-CM

## 2020-10-28 DIAGNOSIS — Z01.812 ENCOUNTER FOR PREPROCEDURE SCREENING LABORATORY TESTING FOR COVID-19: ICD-10-CM

## 2020-10-28 DIAGNOSIS — I21.4 NSTEMI, INITIAL EPISODE OF CARE (HCC): ICD-10-CM

## 2020-10-28 DIAGNOSIS — Z20.822 ENCOUNTER FOR PREPROCEDURE SCREENING LABORATORY TESTING FOR COVID-19: ICD-10-CM

## 2020-10-28 DIAGNOSIS — I48.91 ATRIAL FIBRILLATION WITH RVR (HCC): ICD-10-CM

## 2020-10-28 PROCEDURE — 99496 TRANSJ CARE MGMT HIGH F2F 7D: CPT | Performed by: FAMILY MEDICINE

## 2020-10-28 PROCEDURE — 3075F SYST BP GE 130 - 139MM HG: CPT | Performed by: FAMILY MEDICINE

## 2020-10-28 PROCEDURE — 3078F DIAST BP <80 MM HG: CPT | Performed by: FAMILY MEDICINE

## 2020-10-28 PROCEDURE — 3008F BODY MASS INDEX DOCD: CPT | Performed by: FAMILY MEDICINE

## 2020-10-28 NOTE — PROGRESS NOTES
Patient presents with:  Hospital F/U: On 10-     HPI:    Yaritza Jones is a 77year old male here today for hospital follow up.    He was discharged from Inpatient hospital, [facility] to Home   Admission Date: 10/18/20   Discharge Date: 10 is reduced. Also on metformin, dulaglutide, toujeo 20 units (used to be up to 60units). Cyst - recurrent boil on back. Has had this drained several times, but it keeps coming back. Allergies:  He has No Known Allergies.     Current Meds:    •  Johnna HISTORY: reconciled and reviewed with patient  He  has a past medical history of Calculus of kidney, Cataract, Diabetes ([address] High blood pressure, High cholesterol, and Visual impairment.     He  has a past surgical history that includes other (1963); present.    GI: good BS's, no masses, HSM or tenderness  MUSCULOSKELETAL: back is not tender, weakness and muscle wasting in hands and forearms - chronic  EXTREMITIES: no cyanosis, clubbing or edema  NEURO: Oriented times three, cranial nerves are intact, m possibly triggered by the [address]  which is now [facility]. 2. NSTEMI, initial episode of care [facility])  Efrem Bauer on hospital.  Likely triggered by the atrial fibrillation episode  Will pursue outpatient stress test to ensure heart safety  On Xarelto now.   Also tiffany

## 2020-11-05 ENCOUNTER — TELEPHONE (OUTPATIENT)
Dept: FAMILY MEDICINE CLINIC | Facility: CLINIC | Age: 75
End: 2020-11-05

## 2020-11-05 ENCOUNTER — OFFICE VISIT (OUTPATIENT)
Dept: SURGERY | Facility: CLINIC | Age: 75
End: 2020-11-05

## 2020-11-05 VITALS — HEART RATE: 60 BPM | DIASTOLIC BLOOD PRESSURE: 60 MMHG | SYSTOLIC BLOOD PRESSURE: 100 MMHG

## 2020-11-05 DIAGNOSIS — G56.03 CARPAL TUNNEL SYNDROME, BILATERAL UPPER LIMBS: ICD-10-CM

## 2020-11-05 DIAGNOSIS — G56.23 ULNAR NEUROPATHY OF BOTH UPPER EXTREMITIES: ICD-10-CM

## 2020-11-05 DIAGNOSIS — M25.511 RIGHT SHOULDER PAIN, UNSPECIFIED CHRONICITY: ICD-10-CM

## 2020-11-05 DIAGNOSIS — M47.22 CERVICAL SPONDYLOSIS WITH RADICULOPATHY: ICD-10-CM

## 2020-11-05 DIAGNOSIS — Z98.890 S/P DECOMPRESSION OF ULNAR NERVE: Primary | ICD-10-CM

## 2020-11-05 DIAGNOSIS — Z98.890 S/P CARPAL TUNNEL RELEASE: ICD-10-CM

## 2020-11-05 PROCEDURE — 99213 OFFICE O/P EST LOW 20 MIN: CPT | Performed by: PHYSICIAN ASSISTANT

## 2020-11-05 PROCEDURE — 3074F SYST BP LT 130 MM HG: CPT | Performed by: PHYSICIAN ASSISTANT

## 2020-11-05 PROCEDURE — 3078F DIAST BP <80 MM HG: CPT | Performed by: PHYSICIAN ASSISTANT

## 2020-11-05 NOTE — TELEPHONE ENCOUNTER
Marcella Desai from [facility] called requesting a verbal order to add one more visit for PT. Pt continues to fall.  Please contact Marcella Desai

## 2020-11-05 NOTE — PROGRESS NOTES
Patient here for a follow up on neck,bilateral shoulder pain and weakness left hand. Home health asked for a couple more visits for PT. States recent hospitalization for Afib,UTI had cardioversion. Feels weak.

## 2020-11-05 NOTE — PROGRESS NOTES
Neurosurgery Clinic Visit  2020    Tyler Art PCP:  Roshan Zamora MD    1945 MRN [id number]     HISTORY OF PRESENT ILLNESS:  Tyler Art is a(n) 77year old male who is here for follow-up after left ulnar nerve decompression edema to indicate active denervation.   3.  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

## 2020-11-13 ENCOUNTER — TELEPHONE (OUTPATIENT)
Dept: FAMILY MEDICINE CLINIC | Facility: CLINIC | Age: 75
End: 2020-11-13

## 2020-11-13 ENCOUNTER — TELEPHONE (OUTPATIENT)
Dept: CARDIOLOGY | Age: 75
End: 2020-11-13

## 2020-11-13 DIAGNOSIS — I48.91 ATRIAL FIBRILLATION, UNSPECIFIED TYPE (HCC): Primary | ICD-10-CM

## 2020-11-13 NOTE — TELEPHONE ENCOUNTER
Referral request Dr. Lina Nascimento M.D.,Urology    Office notes from Dr. Tru Rodarte M.D.10/28/2020  HPI: Pt is a 67yo male with PMH ulnar nerve issues, DM who presents after hospitalization.   Was having dysuria and found to have a UTI (klebsiella which was re

## 2020-11-25 ENCOUNTER — TELEPHONE (OUTPATIENT)
Dept: FAMILY MEDICINE CLINIC | Facility: CLINIC | Age: 75
End: 2020-11-25

## 2020-11-25 DIAGNOSIS — Z79.4 TYPE 2 DIABETES MELLITUS WITH MICROALBUMINURIA, WITH LONG-TERM CURRENT USE OF INSULIN (HCC): Primary | ICD-10-CM

## 2020-11-25 DIAGNOSIS — R80.9 TYPE 2 DIABETES MELLITUS WITH MICROALBUMINURIA, WITH LONG-TERM CURRENT USE OF INSULIN (HCC): Primary | ICD-10-CM

## 2020-11-25 DIAGNOSIS — E55.9 VITAMIN D DEFICIENCY: ICD-10-CM

## 2020-11-25 DIAGNOSIS — E11.29 TYPE 2 DIABETES MELLITUS WITH MICROALBUMINURIA, WITH LONG-TERM CURRENT USE OF INSULIN (HCC): Primary | ICD-10-CM

## 2020-11-27 ENCOUNTER — TELEPHONE (OUTPATIENT)
Dept: FAMILY MEDICINE CLINIC | Facility: CLINIC | Age: 75
End: 2020-11-27

## 2020-11-27 DIAGNOSIS — M75.111 NONTRAUMATIC INCOMPLETE TEAR OF RIGHT ROTATOR CUFF: Primary | ICD-10-CM

## 2020-11-27 NOTE — TELEPHONE ENCOUNTER
Dr. Murtaza Lanza patient was seen by you 6/25/2020 for right shoulder pain complaining he could not lift his arm. You referred patient to Dr. Luna Malagon M.D. He saw Dr. Luna Malagon M.D. in July. Patient would like second opinion.   Who do you recommend for ze

## 2020-11-30 RX ORDER — METOPROLOL TARTRATE 50 MG/1
TABLET, FILM COATED ORAL
COMMUNITY
Start: 2020-10-23

## 2020-11-30 RX ORDER — TAMSULOSIN HYDROCHLORIDE 0.4 MG/1
CAPSULE ORAL
COMMUNITY
Start: 2020-11-11

## 2020-11-30 RX ORDER — RIVAROXABAN 20 MG/1
TABLET, FILM COATED ORAL
COMMUNITY
Start: 2020-10-23 | End: 2021-02-04 | Stop reason: SDUPTHER

## 2020-11-30 RX ORDER — ATORVASTATIN CALCIUM 10 MG/1
TABLET, FILM COATED ORAL
COMMUNITY

## 2020-11-30 RX ORDER — ASPIRIN 81 MG/1
TABLET, CHEWABLE ORAL EVERY 24 HOURS
COMMUNITY
End: 2020-12-01 | Stop reason: ALTCHOICE

## 2020-11-30 RX ORDER — MELOXICAM 7.5 MG/1
TABLET ORAL
COMMUNITY
Start: 2020-09-30 | End: 2020-12-01 | Stop reason: ALTCHOICE

## 2020-11-30 RX ORDER — INSULIN GLARGINE 300 U/ML
INJECTION, SOLUTION SUBCUTANEOUS
COMMUNITY

## 2020-11-30 RX ORDER — TORSEMIDE 10 MG/1
TABLET ORAL EVERY 24 HOURS
COMMUNITY
Start: 2019-06-10 | End: 2020-12-01 | Stop reason: ALTCHOICE

## 2020-11-30 RX ORDER — CARVEDILOL 3.12 MG/1
TABLET ORAL
COMMUNITY
Start: 2019-05-13 | End: 2020-12-01 | Stop reason: ALTCHOICE

## 2020-11-30 RX ORDER — MECOBAL/LEVOMEFOLAT CA/B6 PHOS 2-3-35 MG
TABLET ORAL EVERY 12 HOURS
COMMUNITY
Start: 2020-01-17

## 2020-11-30 RX ORDER — DAPAGLIFLOZIN 10 MG/1
TABLET, FILM COATED ORAL
COMMUNITY
Start: 2020-08-26 | End: 2020-12-01 | Stop reason: ALTCHOICE

## 2020-11-30 RX ORDER — DULAGLUTIDE 1.5 MG/.5ML
INJECTION, SOLUTION SUBCUTANEOUS
COMMUNITY

## 2020-11-30 RX ORDER — ERGOCALCIFEROL 1.25 MG/1
CAPSULE ORAL
COMMUNITY
Start: 2015-09-24 | End: 2020-12-01 | Stop reason: ALTCHOICE

## 2020-11-30 RX ORDER — SULFAMETHOXAZOLE AND TRIMETHOPRIM 800; 160 MG/1; MG/1
TABLET ORAL
COMMUNITY
Start: 2020-11-11 | End: 2020-12-01 | Stop reason: ALTCHOICE

## 2020-12-01 ENCOUNTER — OFFICE VISIT (OUTPATIENT)
Dept: CARDIOLOGY | Age: 75
End: 2020-12-01

## 2020-12-01 VITALS
DIASTOLIC BLOOD PRESSURE: 72 MMHG | HEART RATE: 72 BPM | SYSTOLIC BLOOD PRESSURE: 142 MMHG | HEIGHT: 64 IN | WEIGHT: 186 LBS | BODY MASS INDEX: 31.76 KG/M2

## 2020-12-01 DIAGNOSIS — I48.92 ATRIAL FLUTTER, UNSPECIFIED TYPE (CMD): Primary | ICD-10-CM

## 2020-12-01 DIAGNOSIS — I10 ESSENTIAL HYPERTENSION: ICD-10-CM

## 2020-12-01 DIAGNOSIS — R79.89 ELEVATED TROPONIN LEVEL: ICD-10-CM

## 2020-12-01 DIAGNOSIS — E78.00 HYPERCHOLESTEREMIA: ICD-10-CM

## 2020-12-01 PROCEDURE — 3078F DIAST BP <80 MM HG: CPT | Performed by: CLINICAL NURSE SPECIALIST

## 2020-12-01 PROCEDURE — 99204 OFFICE O/P NEW MOD 45 MIN: CPT | Performed by: CLINICAL NURSE SPECIALIST

## 2020-12-01 PROCEDURE — 3077F SYST BP >= 140 MM HG: CPT | Performed by: CLINICAL NURSE SPECIALIST

## 2020-12-01 PROCEDURE — 93000 ELECTROCARDIOGRAM COMPLETE: CPT | Performed by: CLINICAL NURSE SPECIALIST

## 2020-12-01 SDOH — HEALTH STABILITY: MENTAL HEALTH: HOW OFTEN DO YOU HAVE A DRINK CONTAINING ALCOHOL?: NEVER

## 2020-12-01 SDOH — HEALTH STABILITY: PHYSICAL HEALTH: ON AVERAGE, HOW MANY MINUTES DO YOU ENGAGE IN EXERCISE AT THIS LEVEL?: 30 MIN

## 2020-12-01 SDOH — HEALTH STABILITY: PHYSICAL HEALTH: ON AVERAGE, HOW MANY DAYS PER WEEK DO YOU ENGAGE IN MODERATE TO STRENUOUS EXERCISE (LIKE A BRISK WALK)?: 3 DAYS

## 2020-12-01 ASSESSMENT — ENCOUNTER SYMPTOMS
COUGH: 0
FEVER: 0
CHILLS: 0
WEIGHT LOSS: 0
HEMOPTYSIS: 0
ALLERGIC/IMMUNOLOGIC COMMENTS: NO NEW FOOD ALLERGIES
BRUISES/BLEEDS EASILY: 0
SUSPICIOUS LESIONS: 0
WEIGHT GAIN: 0
HEMATOCHEZIA: 0

## 2020-12-01 ASSESSMENT — PATIENT HEALTH QUESTIONNAIRE - PHQ9
CLINICAL INTERPRETATION OF PHQ2 SCORE: NO FURTHER SCREENING NEEDED
SUM OF ALL RESPONSES TO PHQ9 QUESTIONS 1 AND 2: 0
1. LITTLE INTEREST OR PLEASURE IN DOING THINGS: NOT AT ALL
2. FEELING DOWN, DEPRESSED OR HOPELESS: NOT AT ALL
SUM OF ALL RESPONSES TO PHQ9 QUESTIONS 1 AND 2: 0
CLINICAL INTERPRETATION OF PHQ9 SCORE: NO FURTHER SCREENING NEEDED

## 2020-12-10 ENCOUNTER — OFFICE VISIT (OUTPATIENT)
Dept: ORTHOPEDICS CLINIC | Facility: CLINIC | Age: 75
End: 2020-12-10

## 2020-12-10 DIAGNOSIS — M75.41 IMPINGEMENT SYNDROME OF RIGHT SHOULDER: ICD-10-CM

## 2020-12-10 DIAGNOSIS — M47.12 CERVICAL SPONDYLOSIS WITH MYELOPATHY: ICD-10-CM

## 2020-12-10 DIAGNOSIS — M75.111 NONTRAUMATIC INCOMPLETE TEAR OF RIGHT ROTATOR CUFF: ICD-10-CM

## 2020-12-10 DIAGNOSIS — E08.42 DIABETIC POLYNEUROPATHY ASSOCIATED WITH DIABETES MELLITUS DUE TO UNDERLYING CONDITION (HCC): Primary | ICD-10-CM

## 2020-12-10 PROCEDURE — 99243 OFF/OP CNSLTJ NEW/EST LOW 30: CPT | Performed by: ORTHOPAEDIC SURGERY

## 2020-12-10 RX ORDER — MECOBAL/LEVOMEFOLAT CA/B6 PHOS 2-3-35 MG
TABLET ORAL EVERY 12 HOURS
COMMUNITY
Start: 2020-01-17

## 2020-12-10 RX ORDER — TAMSULOSIN HYDROCHLORIDE 0.4 MG/1
CAPSULE ORAL
COMMUNITY
Start: 2020-11-11 | End: 2021-01-28

## 2020-12-10 RX ORDER — METOPROLOL TARTRATE 50 MG/1
TABLET, FILM COATED ORAL
COMMUNITY
Start: 2020-10-23 | End: 2021-06-17

## 2020-12-10 RX ORDER — SULFAMETHOXAZOLE AND TRIMETHOPRIM 800; 160 MG/1; MG/1
1 TABLET ORAL 2 TIMES DAILY
COMMUNITY
Start: 2020-11-11 | End: 2021-06-30 | Stop reason: ALTCHOICE

## 2020-12-10 RX ORDER — ATORVASTATIN CALCIUM 10 MG/1
TABLET, FILM COATED ORAL
COMMUNITY
End: 2021-06-17

## 2020-12-11 ENCOUNTER — LAB ENCOUNTER (OUTPATIENT)
Dept: LAB | Age: 75
End: 2020-12-11
Attending: FAMILY MEDICINE
Payer: COMMERCIAL

## 2020-12-11 DIAGNOSIS — R80.9 TYPE 2 DIABETES MELLITUS WITH MICROALBUMINURIA, WITH LONG-TERM CURRENT USE OF INSULIN (HCC): ICD-10-CM

## 2020-12-11 DIAGNOSIS — E11.29 TYPE 2 DIABETES MELLITUS WITH MICROALBUMINURIA, WITH LONG-TERM CURRENT USE OF INSULIN (HCC): ICD-10-CM

## 2020-12-11 DIAGNOSIS — E55.9 VITAMIN D DEFICIENCY: ICD-10-CM

## 2020-12-11 DIAGNOSIS — Z79.4 TYPE 2 DIABETES MELLITUS WITH MICROALBUMINURIA, WITH LONG-TERM CURRENT USE OF INSULIN (HCC): ICD-10-CM

## 2020-12-11 PROCEDURE — 83036 HEMOGLOBIN GLYCOSYLATED A1C: CPT

## 2020-12-11 PROCEDURE — 36415 COLL VENOUS BLD VENIPUNCTURE: CPT

## 2020-12-11 PROCEDURE — 80053 COMPREHEN METABOLIC PANEL: CPT

## 2020-12-11 PROCEDURE — 82306 VITAMIN D 25 HYDROXY: CPT

## 2020-12-11 NOTE — PROGRESS NOTES
[facility] New Patient Note    CC: Patient presents with:  Shoulder Pain: Patient is here for bilateral shoulder pain. Neck Pain: Patient says that his neck is drooping and hurts to keep it up right.        HPI: The patient is a 77year old ma ULNAR NERVE TRANSPOSITION. WRIST CARPAL TUNNEL RELEASE.     • ULNAR NERVE DECOMPRESSION Left 2/14/2020    Performed by Liss Begum MD at [facility] MAIN OR   • [address] Right 6/12/2019    Performed by Liss Begum MD at [address] [place] [address] aspart (NOVOLOG) 100 UNIT/ML Subcutaneous Solution Inject 20 Units into the skin 2 (two) times daily. 36 mL 1   • Hydrocortisone-Acetic Acid 1-2 % Otic Solution as needed.      • clotrimazole-betamethasone (LOTRISONE) 1-0.05 % External Cream Apply 1 Applica noted about the distal aspect of the right upper extremity with significant atrophy and at the first interossei of the left hand. The upper extremity function is much better than the right actively. No distal edema is noted.     Imaging: X-ray and MRI rig

## 2020-12-14 ENCOUNTER — TELEPHONE (OUTPATIENT)
Dept: CARDIOLOGY | Age: 75
End: 2020-12-14

## 2020-12-14 DIAGNOSIS — R07.9 CHEST PAIN, UNSPECIFIED TYPE: Primary | ICD-10-CM

## 2020-12-14 DIAGNOSIS — R06.09 DYSPNEA ON EXERTION: ICD-10-CM

## 2020-12-18 ENCOUNTER — TELEPHONE (OUTPATIENT)
Dept: FAMILY MEDICINE CLINIC | Facility: CLINIC | Age: 75
End: 2020-12-18

## 2020-12-18 NOTE — TELEPHONE ENCOUNTER
Patient would like to know which neurosurgeon Dr. Nani Dunn would recommend he go to for his neck problem

## 2021-01-01 NOTE — TELEPHONE ENCOUNTER
Patient called requesting refill for:    Vitamin D   Novalog  Toujeo     If can [facility] [Consultation] : a consultation visit [Mother] : mother [FreeTextEntry1] : DDH

## 2021-01-05 ENCOUNTER — TELEPHONE (OUTPATIENT)
Dept: SURGERY | Facility: CLINIC | Age: 76
End: 2021-01-05

## 2021-01-05 NOTE — TELEPHONE ENCOUNTER
Pt to be offered and appointment to address his Cervical Spine.      Per Dr. Shon De La Cruz offer 1/12/2021 at [address] [place] [address]

## 2021-01-12 ENCOUNTER — TELEPHONE (OUTPATIENT)
Dept: PAIN CLINIC | Facility: CLINIC | Age: 76
End: 2021-01-12

## 2021-01-12 ENCOUNTER — OFFICE VISIT (OUTPATIENT)
Dept: SURGERY | Facility: CLINIC | Age: 76
End: 2021-01-12

## 2021-01-12 VITALS
WEIGHT: 185 LBS | SYSTOLIC BLOOD PRESSURE: 110 MMHG | DIASTOLIC BLOOD PRESSURE: 68 MMHG | HEIGHT: 64 IN | BODY MASS INDEX: 31.58 KG/M2 | HEART RATE: 72 BPM

## 2021-01-12 DIAGNOSIS — M47.22 CERVICAL SPONDYLOSIS WITH RADICULOPATHY: Primary | ICD-10-CM

## 2021-01-12 DIAGNOSIS — M25.511 CHRONIC PAIN OF BOTH SHOULDERS: ICD-10-CM

## 2021-01-12 DIAGNOSIS — G56.03 BILATERAL CARPAL TUNNEL SYNDROME: ICD-10-CM

## 2021-01-12 DIAGNOSIS — M25.512 CHRONIC PAIN OF BOTH SHOULDERS: ICD-10-CM

## 2021-01-12 DIAGNOSIS — G56.23 ULNAR NEUROPATHY OF BOTH UPPER EXTREMITIES: ICD-10-CM

## 2021-01-12 DIAGNOSIS — R29.898 SHOULDER WEAKNESS: ICD-10-CM

## 2021-01-12 DIAGNOSIS — G89.29 CHRONIC PAIN OF BOTH SHOULDERS: ICD-10-CM

## 2021-01-12 PROCEDURE — 3008F BODY MASS INDEX DOCD: CPT | Performed by: PHYSICIAN ASSISTANT

## 2021-01-12 PROCEDURE — 3078F DIAST BP <80 MM HG: CPT | Performed by: PHYSICIAN ASSISTANT

## 2021-01-12 PROCEDURE — 99213 OFFICE O/P EST LOW 20 MIN: CPT | Performed by: PHYSICIAN ASSISTANT

## 2021-01-12 PROCEDURE — 3074F SYST BP LT 130 MM HG: CPT | Performed by: PHYSICIAN ASSISTANT

## 2021-01-12 RX ORDER — RIVAROXABAN 20 MG/1
TABLET, FILM COATED ORAL
COMMUNITY
Start: 2020-12-27 | End: 2021-06-30

## 2021-01-12 NOTE — PROGRESS NOTES
Neurosurgery Clinic Visit  2021    Fransisca Wagner PCP:  Maritza Maza MD    1945 MRN [id number]     HISTORY OF PRESENT ILLNESS:  Fransisca Wagner is a(n) 77year old male who is here for follow-up for multiple issues.   His main compla Posterior cervical fusion to help him pull his head up straighter would not be recommended at this time. Will also obtain full set of cervical xrays and xrays of bilateral shoulders. [place] collar was ordered. He cannot drive with this.   He should

## 2021-01-12 NOTE — TELEPHONE ENCOUNTER
Dr. La Nena Galeano office is calling asking if Dr. Malissa Almonte, or someone from his team can please place a referral for this pt to see Dr. Hina Price before his appt with neurology on 1/14.

## 2021-01-12 NOTE — PATIENT INSTRUCTIONS
Refill policies:    • Allow 2-3 business days for refills; controlled substances may take longer.   • Contact your pharmacy at least 5 days prior to running out of medication and have them send an electronic request or submit request through the “request re Depending on your insurance carrier, approval may take 3-10 days. It is highly recommended patients contact their insurance carrier directly to determine coverage.   If test is done without insurance authorization, patient may be responsible for the entire hours:  Monday through Friday                             8:30am-5pm

## 2021-01-13 ENCOUNTER — TELEPHONE (OUTPATIENT)
Dept: SURGERY | Facility: CLINIC | Age: 76
End: 2021-01-13

## 2021-01-13 ENCOUNTER — TELEPHONE (OUTPATIENT)
Dept: FAMILY MEDICINE CLINIC | Facility: CLINIC | Age: 76
End: 2021-01-13

## 2021-01-13 DIAGNOSIS — M47.22 CERVICAL SPONDYLOSIS WITH RADICULOPATHY: Primary | ICD-10-CM

## 2021-01-13 NOTE — TELEPHONE ENCOUNTER
Received order for [employer] collar. Faxed to [address] with office notes, face sheet and insurance cards. Confirmation received.

## 2021-01-13 NOTE — TELEPHONE ENCOUNTER
Referral request [facility]/ Cyrus [facility] [address] [place] [address]  Fax number: [phone]    [address] [place] [address] Pkwy    Patient seen by Dr. Niles Friend M.D. yesterday. Office notes from Dr. Luisa Guerra 1/12/2021  Reviewed imaging with the patient.   Imaging shows a

## 2021-01-13 NOTE — TELEPHONE ENCOUNTER
Patient notified I have submitted referral for collar at [facility]. I will let him know once approved.

## 2021-01-13 NOTE — TELEPHONE ENCOUNTER
Pt is calling he needs a referral [employer] neck collar ref  # [id number]. He needs it for the [facility] phone [phone]. He is requesting it for today.

## 2021-01-14 ENCOUNTER — LAB ENCOUNTER (OUTPATIENT)
Dept: LAB | Age: 76
End: 2021-01-14
Attending: Other
Payer: COMMERCIAL

## 2021-01-14 ENCOUNTER — OFFICE VISIT (OUTPATIENT)
Dept: NEUROLOGY | Facility: CLINIC | Age: 76
End: 2021-01-14

## 2021-01-14 VITALS
WEIGHT: 184 LBS | RESPIRATION RATE: 14 BRPM | HEART RATE: 68 BPM | DIASTOLIC BLOOD PRESSURE: 68 MMHG | BODY MASS INDEX: 32 KG/M2 | SYSTOLIC BLOOD PRESSURE: 124 MMHG

## 2021-01-14 DIAGNOSIS — M62.81 MUSCLE WEAKNESS (GENERALIZED): ICD-10-CM

## 2021-01-14 DIAGNOSIS — E08.42 DIABETIC POLYNEUROPATHY ASSOCIATED WITH DIABETES MELLITUS DUE TO UNDERLYING CONDITION (HCC): Primary | ICD-10-CM

## 2021-01-14 LAB — CK SERPL-CCNC: 222 U/L

## 2021-01-14 PROCEDURE — 86255 FLUORESCENT ANTIBODY SCREEN: CPT

## 2021-01-14 PROCEDURE — 3074F SYST BP LT 130 MM HG: CPT | Performed by: OTHER

## 2021-01-14 PROCEDURE — 83516 IMMUNOASSAY NONANTIBODY: CPT

## 2021-01-14 PROCEDURE — 83519 RIA NONANTIBODY: CPT

## 2021-01-14 PROCEDURE — 36415 COLL VENOUS BLD VENIPUNCTURE: CPT

## 2021-01-14 PROCEDURE — 82085 ASSAY OF ALDOLASE: CPT

## 2021-01-14 PROCEDURE — 84238 ASSAY NONENDOCRINE RECEPTOR: CPT

## 2021-01-14 PROCEDURE — 99214 OFFICE O/P EST MOD 30 MIN: CPT | Performed by: OTHER

## 2021-01-14 PROCEDURE — 3078F DIAST BP <80 MM HG: CPT | Performed by: OTHER

## 2021-01-14 PROCEDURE — 82550 ASSAY OF CK (CPK): CPT

## 2021-01-14 NOTE — H&P
Neurology H&P    Tariq Reyes Patient Status:  No patient class for patient encounter    1945 MRN [id number]   Location [address] [place] [address], [address] [place] [address] Drive, [address] [place] [address] Attending No att. providers found   [facility] Day # 0 PCP Maritza Maza MD • Insulin Glargine, 1 Unit Dial, 300 UNIT/ML Subcutaneous Solution Pen-injector INJECT 60 UNITS UNDER THE SKIN DAILY     • Insulin Aspart Pen 100 UNIT/ML Subcutaneous Solution Pen-injector 20 units three  a day     • atorvastatin 10 MG Oral Tab TAKE 1 TA Neuropathy     Diabetes mellitus (HCC)     Hyperlipidemia LDL goal <100     Right arm weakness     Sepsis ([address]     Urinary tract infection without hematuria     Essential hypertension     Leukocytosis     Pyelonephritis     UTI (urinary tract infection) 20.00        Pack years: 21        Quit date: 1981        Years since quittin.6      Smokeless tobacco: Never Used    Alcohol use: No    Drug use: No      Family History:  No family history on file.     No FH of neuromuscular disease    ROS:  10 po no shuffling, stopped, dystonic posturing ion the R shoulder cervical muscles        Labs:       Imaging:  MRI right Shoulder 1/23/20  IMPRESSION:  1.   Chronic-appearing low-grade partial-thickness bursal-sided supraspinatus tear of the far  anterior edge diseases. MRI of the R shoulder was reviewed and a year ago already showed significant fatty infiltrations of multiple muscles in the RUE      Plan:  1. Severe Diabetic polyneuropathy  - Continue current managment    2.  BUE proximal muscle weakness and cer

## 2021-01-15 NOTE — TELEPHONE ENCOUNTER
Patient notified that collar is approved for [facility].    I will fax over approval.

## 2021-01-16 ENCOUNTER — HOSPITAL ENCOUNTER (OUTPATIENT)
Dept: GENERAL RADIOLOGY | Facility: HOSPITAL | Age: 76
Discharge: HOME | End: 2021-01-16
Attending: PHYSICIAN ASSISTANT
Payer: COMMERCIAL

## 2021-01-16 DIAGNOSIS — M47.22 CERVICAL SPONDYLOSIS WITH RADICULOPATHY: ICD-10-CM

## 2021-01-16 DIAGNOSIS — R29.898 SHOULDER WEAKNESS: ICD-10-CM

## 2021-01-16 DIAGNOSIS — M25.511 CHRONIC PAIN OF BOTH SHOULDERS: ICD-10-CM

## 2021-01-16 DIAGNOSIS — G89.29 CHRONIC PAIN OF BOTH SHOULDERS: ICD-10-CM

## 2021-01-16 DIAGNOSIS — M25.512 CHRONIC PAIN OF BOTH SHOULDERS: ICD-10-CM

## 2021-01-16 LAB — ALDOLASE, SERUM: 8.2 U/L

## 2021-01-16 PROCEDURE — 73030 X-RAY EXAM OF SHOULDER: CPT | Performed by: PHYSICIAN ASSISTANT

## 2021-01-16 PROCEDURE — 72052 X-RAY EXAM NECK SPINE 6/>VWS: CPT | Performed by: PHYSICIAN ASSISTANT

## 2021-01-18 ENCOUNTER — HOSPITAL ENCOUNTER (OUTPATIENT)
Dept: MRI IMAGING | Facility: HOSPITAL | Age: 76
Discharge: HOME | End: 2021-01-18
Attending: PHYSICIAN ASSISTANT
Payer: COMMERCIAL

## 2021-01-18 DIAGNOSIS — M47.22 CERVICAL SPONDYLOSIS WITH RADICULOPATHY: ICD-10-CM

## 2021-01-18 DIAGNOSIS — M25.512 CHRONIC PAIN OF BOTH SHOULDERS: ICD-10-CM

## 2021-01-18 DIAGNOSIS — R29.898 SHOULDER WEAKNESS: ICD-10-CM

## 2021-01-18 DIAGNOSIS — G89.29 CHRONIC PAIN OF BOTH SHOULDERS: ICD-10-CM

## 2021-01-18 DIAGNOSIS — M25.511 CHRONIC PAIN OF BOTH SHOULDERS: ICD-10-CM

## 2021-01-18 LAB
ACETYLCHOLINE BINDING AB: 0 NMOL/L
ACETYLCHOLINE BLOCKING AB: 0 %
TITIN ANTIBODY: 0.09 IV

## 2021-01-18 PROCEDURE — 72141 MRI NECK SPINE W/O DYE: CPT | Performed by: PHYSICIAN ASSISTANT

## 2021-01-26 ENCOUNTER — TELEPHONE (OUTPATIENT)
Dept: SURGERY | Facility: CLINIC | Age: 76
End: 2021-01-26

## 2021-01-26 NOTE — TELEPHONE ENCOUNTER
pt wants to notify Anastasiia Laura, that he will be having an EMG with Dr Larry Saenz on 2/11/21, and states he will schedule a f/u once he gets results for EMG

## 2021-01-28 DIAGNOSIS — N12 PYELONEPHRITIS: Primary | ICD-10-CM

## 2021-01-28 RX ORDER — RIVAROXABAN 20 MG/1
TABLET, FILM COATED ORAL
Refills: 0 | Status: CANCELLED | OUTPATIENT
Start: 2021-01-28

## 2021-01-28 NOTE — TELEPHONE ENCOUNTER
Pt requesting these medications be sent to his [employer] mail-in order for a 3 month supply:    - XARELTO 20 MG Oral Tab  - tamsulosin (FLOMAX) cap    Please call the pt if you have any questions.

## 2021-01-29 RX ORDER — TAMSULOSIN HYDROCHLORIDE 0.4 MG/1
0.4 CAPSULE ORAL EVERY MORNING
Qty: 90 CAPSULE | Refills: 3 | Status: SHIPPED | OUTPATIENT
Start: 2021-01-29 | End: 2021-02-03

## 2021-02-03 RX ORDER — TAMSULOSIN HYDROCHLORIDE 0.4 MG/1
0.4 CAPSULE ORAL EVERY MORNING
Qty: 90 CAPSULE | Refills: 3 | Status: SHIPPED | OUTPATIENT
Start: 2021-02-03 | End: 2021-10-04

## 2021-02-04 RX ORDER — RIVAROXABAN 20 MG/1
20 TABLET, FILM COATED ORAL DAILY
Qty: 30 TABLET | Refills: 3 | Status: CANCELLED | OUTPATIENT
Start: 2021-02-04

## 2021-02-04 RX ORDER — RIVAROXABAN 20 MG/1
20 TABLET, FILM COATED ORAL DAILY
Qty: 30 TABLET | Refills: 3 | Status: SHIPPED | OUTPATIENT
Start: 2021-02-04 | End: 2021-02-16 | Stop reason: SDUPTHER

## 2021-02-04 RX ORDER — RIVAROXABAN 20 MG/1
20 TABLET, FILM COATED ORAL
Qty: 30 TABLET | Refills: 3 | Status: CANCELLED | OUTPATIENT
Start: 2021-02-04 | End: 2021-03-06

## 2021-02-06 DIAGNOSIS — Z23 NEED FOR VACCINATION: ICD-10-CM

## 2021-02-11 ENCOUNTER — PROCEDURE VISIT (OUTPATIENT)
Dept: NEUROLOGY | Facility: CLINIC | Age: 76
End: 2021-02-11

## 2021-02-11 DIAGNOSIS — M62.81 MUSCLE WEAKNESS: Primary | ICD-10-CM

## 2021-02-11 PROCEDURE — 95886 MUSC TEST DONE W/N TEST COMP: CPT | Performed by: OTHER

## 2021-02-11 PROCEDURE — 95908 NRV CNDJ TST 3-4 STUDIES: CPT | Performed by: OTHER

## 2021-02-14 ENCOUNTER — IMMUNIZATION (OUTPATIENT)
Dept: LAB | Age: 76
End: 2021-02-14
Attending: HOSPITALIST
Payer: COMMERCIAL

## 2021-02-14 DIAGNOSIS — Z23 NEED FOR VACCINATION: Primary | ICD-10-CM

## 2021-02-14 PROCEDURE — 0001A SARSCOV2 VAC 30MCG/0.3ML IM: CPT

## 2021-02-16 ENCOUNTER — TELEPHONE (OUTPATIENT)
Dept: SCHEDULING | Age: 76
End: 2021-02-16

## 2021-02-16 RX ORDER — RIVAROXABAN 20 MG/1
20 TABLET, FILM COATED ORAL DAILY
Qty: 90 TABLET | Refills: 0 | Status: SHIPPED | OUTPATIENT
Start: 2021-02-16

## 2021-02-18 NOTE — PROCEDURES
[facility]  [address] [facility]  Phone- [phone]  Nerve Conduction & Electromyography Report            Patient: Eliza Kahn  Patient ID: [id number]  Sex:  Male  YOB: 1945  Age: 77 Y Summary:  The R median SNAP was prolonged in distal latency, normal in amplitude and sclowed in conduction velocity. The R ulnar SNAP was prolonged in distal latency, low in amplitude and slowed in conduction velocity.     The R median and R ulnar m

## 2021-03-05 ENCOUNTER — APPOINTMENT (OUTPATIENT)
Dept: CARDIOLOGY | Age: 76
End: 2021-03-05

## 2021-03-07 ENCOUNTER — IMMUNIZATION (OUTPATIENT)
Dept: LAB | Age: 76
End: 2021-03-07
Attending: HOSPITALIST
Payer: COMMERCIAL

## 2021-03-07 DIAGNOSIS — Z23 NEED FOR VACCINATION: Primary | ICD-10-CM

## 2021-03-07 PROCEDURE — 0002A SARSCOV2 VAC 30MCG/0.3ML IM: CPT

## 2021-03-12 ENCOUNTER — OFFICE VISIT (OUTPATIENT)
Dept: CARDIOLOGY | Age: 76
End: 2021-03-12

## 2021-03-12 VITALS
SYSTOLIC BLOOD PRESSURE: 122 MMHG | WEIGHT: 185 LBS | HEART RATE: 66 BPM | BODY MASS INDEX: 31.76 KG/M2 | DIASTOLIC BLOOD PRESSURE: 60 MMHG

## 2021-03-12 DIAGNOSIS — R79.89 TROPONIN LEVEL ELEVATED: ICD-10-CM

## 2021-03-12 DIAGNOSIS — I48.3 TYPICAL ATRIAL FLUTTER (CMD): Primary | ICD-10-CM

## 2021-03-12 PROCEDURE — 3078F DIAST BP <80 MM HG: CPT | Performed by: INTERNAL MEDICINE

## 2021-03-12 PROCEDURE — 99215 OFFICE O/P EST HI 40 MIN: CPT | Performed by: INTERNAL MEDICINE

## 2021-03-12 PROCEDURE — 3074F SYST BP LT 130 MM HG: CPT | Performed by: INTERNAL MEDICINE

## 2021-03-12 RX ORDER — LOSARTAN POTASSIUM 100 MG/1
100 TABLET ORAL DAILY
COMMUNITY
Start: 2021-01-22

## 2021-03-12 ASSESSMENT — ENCOUNTER SYMPTOMS
WEIGHT GAIN: 0
HEMOPTYSIS: 0
SUSPICIOUS LESIONS: 0
CHILLS: 0
COUGH: 0
ALLERGIC/IMMUNOLOGIC COMMENTS: NO NEW FOOD ALLERGIES
HEMATOCHEZIA: 0
BRUISES/BLEEDS EASILY: 0
FEVER: 0
WEIGHT LOSS: 0

## 2021-03-12 ASSESSMENT — PATIENT HEALTH QUESTIONNAIRE - PHQ9
SUM OF ALL RESPONSES TO PHQ9 QUESTIONS 1 AND 2: 0
SUM OF ALL RESPONSES TO PHQ9 QUESTIONS 1 AND 2: 0
1. LITTLE INTEREST OR PLEASURE IN DOING THINGS: NOT AT ALL
CLINICAL INTERPRETATION OF PHQ9 SCORE: NO FURTHER SCREENING NEEDED
CLINICAL INTERPRETATION OF PHQ2 SCORE: NO FURTHER SCREENING NEEDED
2. FEELING DOWN, DEPRESSED OR HOPELESS: NOT AT ALL

## 2021-04-13 ENCOUNTER — TELEPHONE (OUTPATIENT)
Dept: FAMILY MEDICINE CLINIC | Facility: CLINIC | Age: 76
End: 2021-04-13

## 2021-04-13 DIAGNOSIS — E11.29 TYPE 2 DIABETES MELLITUS WITH MICROALBUMINURIA, WITH LONG-TERM CURRENT USE OF INSULIN (HCC): ICD-10-CM

## 2021-04-13 DIAGNOSIS — Z79.4 TYPE 2 DIABETES MELLITUS WITH MICROALBUMINURIA, WITH LONG-TERM CURRENT USE OF INSULIN (HCC): ICD-10-CM

## 2021-04-13 DIAGNOSIS — R80.9 TYPE 2 DIABETES MELLITUS WITH MICROALBUMINURIA, WITH LONG-TERM CURRENT USE OF INSULIN (HCC): ICD-10-CM

## 2021-04-13 RX ORDER — LOSARTAN POTASSIUM 100 MG/1
100 TABLET ORAL DAILY
Qty: 90 TABLET | Refills: 0 | Status: SHIPPED | OUTPATIENT
Start: 2021-04-13 | End: 2021-06-17

## 2021-04-13 NOTE — TELEPHONE ENCOUNTER
Medication refilled per protocol. Requested Prescriptions     Signed Prescriptions Disp Refills   • losartan 100 MG Oral Tab 90 tablet 0     Sig: Take 1 tablet (100 mg total) by mouth daily.      Authorizing Provider: Lizet Manley     Ordering User:

## 2021-04-16 ENCOUNTER — TELEPHONE (OUTPATIENT)
Dept: NEUROLOGY | Facility: CLINIC | Age: 76
End: 2021-04-16

## 2021-04-16 DIAGNOSIS — E08.42 DIABETIC POLYNEUROPATHY ASSOCIATED WITH DIABETES MELLITUS DUE TO UNDERLYING CONDITION (HCC): ICD-10-CM

## 2021-04-16 DIAGNOSIS — M62.81 MUSCLE WEAKNESS: Primary | ICD-10-CM

## 2021-04-16 DIAGNOSIS — M62.81 MUSCLE WEAKNESS (GENERALIZED): ICD-10-CM

## 2021-04-16 DIAGNOSIS — G56.23 ULNAR NEUROPATHY OF BOTH UPPER EXTREMITIES: ICD-10-CM

## 2021-04-16 NOTE — TELEPHONE ENCOUNTER
When pt last saw Dr. Soren Moncada a referral to see a Dr. Tan Bundy at [facility] was to be done and faxed.   Call pt to advise

## 2021-04-16 NOTE — TELEPHONE ENCOUNTER
Per PSR: When pt last saw Dr. Julien Sánchez a referral to see a Dr. Ronald Anthony at [facility] was to be done and faxed. Call pt to advise    Per chart review, pt has Kendra, [address] [place] [address], [address] [place] [address], but not [facility].   Will

## 2021-04-19 ENCOUNTER — TELEPHONE (OUTPATIENT)
Dept: NEUROLOGY | Facility: CLINIC | Age: 76
End: 2021-04-19

## 2021-04-19 NOTE — TELEPHONE ENCOUNTER
Called patient back and he stated he thought referral had already been made to . Informed patient that due to insurance he will need to see a provider at either [facility], [address] [facility] or [facility]. Referral has been placed for Dr. Claudia Rossi with [address] [facility].

## 2021-04-19 NOTE — TELEPHONE ENCOUNTER
Pt is calling for an update on the referral. I read to him the message about his insurance and switching the referral to another doctor.  He would like a call within 2 hours (before he has to leave)

## 2021-04-19 NOTE — TELEPHONE ENCOUNTER
Per alternate TE dated 4/19/2021.     SC    4/19/21 11:33 AM  Aleida Ace [facility]    4/19/21 11:33 AM  Note     Pt is calling for an update on the referral. I read to him the mayela

## 2021-04-19 NOTE — TELEPHONE ENCOUNTER
Pended referral to Dr. Connie Sims. Changed status to pending and added office visit notes and EM[facility] notes.

## 2021-04-21 NOTE — TELEPHONE ENCOUNTER
Per referral note from [facility]:    Per new policy effective 14/02/45, specialist can not refer to another specialist without patient first being seen by PCP.     This request has been canceled.     Sent clarification question to [facility] to determine if once patient

## 2021-04-21 NOTE — TELEPHONE ENCOUNTER
Received response back from [facility]. PCP will need to submit the referral if they agree with specialist.    Sent patient a [employer] message and routed to Dr. Praneeth Blackburn as well.

## 2021-04-21 NOTE — TELEPHONE ENCOUNTER
We have seen each other for this on several occasions in the past.  I do not need repeat office visit to approve. Will place referral at this time. From Dr. Nabeel Vila: [facility].  Dr. Jennifer Beltran or Dr. Leilani Vaughan referral placed for special

## 2021-04-22 NOTE — TELEPHONE ENCOUNTER
Left message for patient to return my call. Referral placed for Dr. Tiffany Sandoval M.D. but we are awaiting approval from the insurance company. Asked patient to return my call.

## 2021-04-23 ENCOUNTER — TELEPHONE (OUTPATIENT)
Dept: FAMILY MEDICINE CLINIC | Facility: CLINIC | Age: 76
End: 2021-04-23

## 2021-04-23 DIAGNOSIS — E11.8 DIABETIC COMPLICATION (HCC): Primary | ICD-10-CM

## 2021-04-23 NOTE — TELEPHONE ENCOUNTER
Referral request [place] [address]  Fax number:  [phone]     x 360 units  [phone] x 4 units    Office notes from Dr. Estella Inman M.D. 9/21/2020  6.  Type 2 diabetes mellitus with microalbuminuria, with long-term current use of insulin (HCC)  Per machine,

## 2021-04-26 ENCOUNTER — TELEMEDICINE (OUTPATIENT)
Dept: FAMILY MEDICINE CLINIC | Facility: CLINIC | Age: 76
End: 2021-04-26

## 2021-04-26 DIAGNOSIS — M62.542 ATROPHY OF LEFT HAND MUSCLES: ICD-10-CM

## 2021-04-26 DIAGNOSIS — M47.22 CERVICAL SPONDYLOSIS WITH RADICULOPATHY: ICD-10-CM

## 2021-04-26 DIAGNOSIS — M53.82 NECK MUSCLE WEAKNESS: Primary | ICD-10-CM

## 2021-04-26 DIAGNOSIS — R29.898 RIGHT ARM WEAKNESS: ICD-10-CM

## 2021-04-26 DIAGNOSIS — G56.23 ULNAR NEUROPATHY OF BOTH UPPER EXTREMITIES: ICD-10-CM

## 2021-04-26 DIAGNOSIS — B35.6 TINEA CRURIS: ICD-10-CM

## 2021-04-26 PROCEDURE — 99213 OFFICE O/P EST LOW 20 MIN: CPT | Performed by: FAMILY MEDICINE

## 2021-04-26 RX ORDER — CLOTRIMAZOLE AND BETAMETHASONE DIPROPIONATE 10; .64 MG/G; MG/G
1 CREAM TOPICAL 2 TIMES DAILY PRN
Qty: 45 G | Refills: 3 | Status: SHIPPED | OUTPATIENT
Start: 2021-04-26 | End: 2021-06-30

## 2021-04-26 NOTE — PROGRESS NOTES
Patient presents with:  Referral: To Dr. Danielito Leija is a 77year old male who presents for had concerns including Referral (To Dr. Luis Daniel Car).    Encounter via video visit    Objective:   HPI:   Patient with known ulnar neuropathy bilateral clotrimazole-betamethasone 1-0.05 % External Cream; Apply 1 Application topically 2 (two) times daily as needed. Reinforced healthy diet, lifestyle, and exercise. as tolerated. No follow-ups on file. Supplementary Documentation:    This visit i

## 2021-04-27 ENCOUNTER — TELEPHONE (OUTPATIENT)
Dept: FAMILY MEDICINE CLINIC | Facility: CLINIC | Age: 76
End: 2021-04-27

## 2021-06-15 ENCOUNTER — TELEPHONE (OUTPATIENT)
Dept: FAMILY MEDICINE CLINIC | Facility: CLINIC | Age: 76
End: 2021-06-15

## 2021-06-15 DIAGNOSIS — E11.40 TYPE 2 DIABETES MELLITUS WITH DIABETIC NEUROPATHY, UNSPECIFIED WHETHER LONG TERM INSULIN USE (HCC): Primary | ICD-10-CM

## 2021-06-15 NOTE — TELEPHONE ENCOUNTER
w are technically due for our annual around the end of the month if he would want to schedule this, we could check all labs, not just A1C

## 2021-06-15 NOTE — TELEPHONE ENCOUNTER
Pt states he got a [employer] message stating to schedule for his A1C. Pt is requesting an order be placed.

## 2021-06-16 ENCOUNTER — TELEPHONE (OUTPATIENT)
Dept: FAMILY MEDICINE CLINIC | Facility: CLINIC | Age: 76
End: 2021-06-16

## 2021-06-16 DIAGNOSIS — E11.40 TYPE 2 DIABETES MELLITUS WITH DIABETIC NEUROPATHY, UNSPECIFIED WHETHER LONG TERM INSULIN USE (HCC): Primary | ICD-10-CM

## 2021-06-16 DIAGNOSIS — D72.829 LEUKOCYTOSIS, UNSPECIFIED TYPE: ICD-10-CM

## 2021-06-16 DIAGNOSIS — Z12.5 SCREENING FOR MALIGNANT NEOPLASM OF PROSTATE: ICD-10-CM

## 2021-06-16 DIAGNOSIS — E78.5 HYPERLIPIDEMIA LDL GOAL <100: ICD-10-CM

## 2021-06-16 NOTE — TELEPHONE ENCOUNTER
Called patient and scheduled physical for 06/30/21, advised patient full fasting labs would be placed for him

## 2021-06-16 NOTE — TELEPHONE ENCOUNTER
Please enter lab orders for the patient's upcoming physical appointment. Physical scheduled:    Your appointments     Date & Time Appointment Department [facility])    Jun 30, 2021  2:00 PM CDT Physical - Established with Idell Klinefelter, MD [address] [place] [address]

## 2021-06-17 ENCOUNTER — TELEPHONE (OUTPATIENT)
Dept: FAMILY MEDICINE CLINIC | Facility: CLINIC | Age: 76
End: 2021-06-17

## 2021-06-17 DIAGNOSIS — Z79.4 TYPE 2 DIABETES MELLITUS WITH MICROALBUMINURIA, WITH LONG-TERM CURRENT USE OF INSULIN (HCC): ICD-10-CM

## 2021-06-17 DIAGNOSIS — E11.29 TYPE 2 DIABETES MELLITUS WITH MICROALBUMINURIA, WITH LONG-TERM CURRENT USE OF INSULIN (HCC): ICD-10-CM

## 2021-06-17 DIAGNOSIS — R80.9 TYPE 2 DIABETES MELLITUS WITH MICROALBUMINURIA, WITH LONG-TERM CURRENT USE OF INSULIN (HCC): ICD-10-CM

## 2021-06-17 RX ORDER — ATORVASTATIN CALCIUM 10 MG/1
10 TABLET, FILM COATED ORAL DAILY
Qty: 30 TABLET | Refills: 0 | Status: SHIPPED | OUTPATIENT
Start: 2021-06-17 | End: 2021-06-17

## 2021-06-17 RX ORDER — ATORVASTATIN CALCIUM 10 MG/1
TABLET, FILM COATED ORAL
Qty: 90 TABLET | Refills: 0 | OUTPATIENT
Start: 2021-06-17

## 2021-06-17 RX ORDER — LOSARTAN POTASSIUM 100 MG/1
100 TABLET ORAL DAILY
Qty: 90 TABLET | Refills: 1 | Status: SHIPPED | OUTPATIENT
Start: 2021-06-17 | End: 2022-01-17

## 2021-06-17 RX ORDER — LOSARTAN POTASSIUM 100 MG/1
100 TABLET ORAL DAILY
Qty: 30 TABLET | Refills: 0 | Status: SHIPPED | OUTPATIENT
Start: 2021-06-17 | End: 2021-06-17

## 2021-06-17 RX ORDER — ATORVASTATIN CALCIUM 10 MG/1
10 TABLET, FILM COATED ORAL DAILY
Qty: 90 TABLET | Refills: 1 | Status: SHIPPED | OUTPATIENT
Start: 2021-06-17 | End: 2021-06-30

## 2021-06-17 NOTE — TELEPHONE ENCOUNTER
Patient needs his Losartan and Atorvastatin he is completely out of both of the medications. Pat Koehler is who is goes to for 90 day supply    Does he need an emergency dosage sent to his local [facility].

## 2021-06-21 ENCOUNTER — TELEPHONE (OUTPATIENT)
Dept: FAMILY MEDICINE CLINIC | Facility: CLINIC | Age: 76
End: 2021-06-21

## 2021-06-21 NOTE — TELEPHONE ENCOUNTER
TC from patient Dr. Edwar Jj M.D. did not receive referral.   Referral was faxed 6/17/21. Faxed again today to Dr. Edwar Jj M.D. office.  Also patient is mailed a copy of authorized referral.

## 2021-06-30 ENCOUNTER — OFFICE VISIT (OUTPATIENT)
Dept: FAMILY MEDICINE CLINIC | Facility: CLINIC | Age: 76
End: 2021-06-30

## 2021-06-30 VITALS
BODY MASS INDEX: 31.86 KG/M2 | TEMPERATURE: 98 F | HEIGHT: 63 IN | HEART RATE: 72 BPM | WEIGHT: 179.81 LBS | SYSTOLIC BLOOD PRESSURE: 128 MMHG | DIASTOLIC BLOOD PRESSURE: 76 MMHG

## 2021-06-30 DIAGNOSIS — R80.9 TYPE 2 DIABETES MELLITUS WITH MICROALBUMINURIA, WITH LONG-TERM CURRENT USE OF INSULIN (HCC): ICD-10-CM

## 2021-06-30 DIAGNOSIS — M47.22 CERVICAL SPONDYLOSIS WITH RADICULOPATHY: ICD-10-CM

## 2021-06-30 DIAGNOSIS — Z12.11 COLON CANCER SCREENING: ICD-10-CM

## 2021-06-30 DIAGNOSIS — M53.82 NECK MUSCLE WEAKNESS: ICD-10-CM

## 2021-06-30 DIAGNOSIS — R29.898 RIGHT ARM WEAKNESS: ICD-10-CM

## 2021-06-30 DIAGNOSIS — Z79.4 TYPE 2 DIABETES MELLITUS WITH MICROALBUMINURIA, WITH LONG-TERM CURRENT USE OF INSULIN (HCC): ICD-10-CM

## 2021-06-30 DIAGNOSIS — E11.29 TYPE 2 DIABETES MELLITUS WITH MICROALBUMINURIA, WITH LONG-TERM CURRENT USE OF INSULIN (HCC): ICD-10-CM

## 2021-06-30 DIAGNOSIS — G56.23 ULNAR NEUROPATHY OF BOTH UPPER EXTREMITIES: ICD-10-CM

## 2021-06-30 DIAGNOSIS — M62.542 ATROPHY OF LEFT HAND MUSCLES: ICD-10-CM

## 2021-06-30 DIAGNOSIS — Z00.00 ANNUAL PHYSICAL EXAM: Primary | ICD-10-CM

## 2021-06-30 PROCEDURE — 3078F DIAST BP <80 MM HG: CPT | Performed by: FAMILY MEDICINE

## 2021-06-30 PROCEDURE — 3008F BODY MASS INDEX DOCD: CPT | Performed by: FAMILY MEDICINE

## 2021-06-30 PROCEDURE — 3074F SYST BP LT 130 MM HG: CPT | Performed by: FAMILY MEDICINE

## 2021-06-30 PROCEDURE — 99397 PER PM REEVAL EST PAT 65+ YR: CPT | Performed by: FAMILY MEDICINE

## 2021-06-30 RX ORDER — ASPIRIN 81 MG/1
81 TABLET ORAL DAILY
COMMUNITY
End: 2021-11-18

## 2021-06-30 NOTE — PROGRESS NOTES
Patient presents with:  Physical: Annual      HPI:   Ami Murrell is a 77year old male who presents for a complete physical exam. Feeling well overall. Last colonoscopy:  Last 2015. Was on a q3yr schedule. Last PSA:  Using Flomax.   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  Pulsation pedal pulse exam of both lower legs/feet is normal as well. Pulses: 2+ and symmetric  Neurologic: muscle atrophy as above. Neck weakness.      ASSESSMENT AND PLAN:     Mike Hartmann was seen in the office today:  had

## 2021-07-06 ENCOUNTER — LAB ENCOUNTER (OUTPATIENT)
Dept: LAB | Age: 76
End: 2021-07-06
Attending: FAMILY MEDICINE
Payer: COMMERCIAL

## 2021-07-06 DIAGNOSIS — E11.40 TYPE 2 DIABETES MELLITUS WITH DIABETIC NEUROPATHY, UNSPECIFIED WHETHER LONG TERM INSULIN USE (HCC): ICD-10-CM

## 2021-07-06 DIAGNOSIS — E78.5 HYPERLIPIDEMIA LDL GOAL <100: ICD-10-CM

## 2021-07-06 DIAGNOSIS — Z12.5 SCREENING FOR MALIGNANT NEOPLASM OF PROSTATE: ICD-10-CM

## 2021-07-06 DIAGNOSIS — D72.829 LEUKOCYTOSIS, UNSPECIFIED TYPE: ICD-10-CM

## 2021-07-06 LAB
ALBUMIN SERPL-MCNC: 3.1 G/DL (ref 3.4–5)
ALBUMIN/GLOB SERPL: 0.7 {RATIO} (ref 1–2)
ALP LIVER SERPL-CCNC: 71 U/L
ALT SERPL-CCNC: 19 U/L
ANION GAP SERPL CALC-SCNC: 4 MMOL/L (ref 0–18)
AST SERPL-CCNC: 19 U/L (ref 15–37)
BASOPHILS # BLD AUTO: 0.06 X10(3) UL (ref 0–0.2)
BASOPHILS NFR BLD AUTO: 0.6 %
BILIRUB SERPL-MCNC: 0.4 MG/DL (ref 0.1–2)
BUN BLD-MCNC: 15 MG/DL (ref 7–18)
BUN/CREAT SERPL: 23.8 (ref 10–20)
CALCIUM BLD-MCNC: 8.5 MG/DL (ref 8.5–10.1)
CHLORIDE SERPL-SCNC: 109 MMOL/L (ref 98–112)
CHOLEST SMN-MCNC: 119 MG/DL (ref ?–200)
CO2 SERPL-SCNC: 26 MMOL/L (ref 21–32)
COMPLEXED PSA SERPL-MCNC: 6.71 NG/ML (ref ?–4)
CREAT BLD-MCNC: 0.63 MG/DL
CREAT UR-SCNC: 64.8 MG/DL
DEPRECATED RDW RBC AUTO: 43.6 FL (ref 35.1–46.3)
EOSINOPHIL # BLD AUTO: 0.35 X10(3) UL (ref 0–0.7)
EOSINOPHIL NFR BLD AUTO: 3.6 %
ERYTHROCYTE [DISTWIDTH] IN BLOOD BY AUTOMATED COUNT: 12.7 % (ref 11–15)
EST. AVERAGE GLUCOSE BLD GHB EST-MCNC: 140 MG/DL (ref 68–126)
GLOBULIN PLAS-MCNC: 4.2 G/DL (ref 2.8–4.4)
GLUCOSE BLD-MCNC: 146 MG/DL (ref 70–99)
HBA1C MFR BLD HPLC: 6.5 % (ref ?–5.7)
HCT VFR BLD AUTO: 43.8 %
HDLC SERPL-MCNC: 43 MG/DL (ref 40–59)
HGB BLD-MCNC: 14.1 G/DL
IMM GRANULOCYTES # BLD AUTO: 0.02 X10(3) UL (ref 0–1)
IMM GRANULOCYTES NFR BLD: 0.2 %
LDLC SERPL CALC-MCNC: 57 MG/DL (ref ?–100)
LYMPHOCYTES # BLD AUTO: 2.55 X10(3) UL (ref 1–4)
LYMPHOCYTES NFR BLD AUTO: 26.4 %
M PROTEIN MFR SERPL ELPH: 7.3 G/DL (ref 6.4–8.2)
MCH RBC QN AUTO: 30.2 PG (ref 26–34)
MCHC RBC AUTO-ENTMCNC: 32.2 G/DL (ref 31–37)
MCV RBC AUTO: 93.8 FL
MICROALBUMIN UR-MCNC: 57.9 MG/DL
MICROALBUMIN/CREAT 24H UR-RTO: 893.5 UG/MG (ref ?–30)
MONOCYTES # BLD AUTO: 0.69 X10(3) UL (ref 0.1–1)
MONOCYTES NFR BLD AUTO: 7.1 %
NEUTROPHILS # BLD AUTO: 6 X10 (3) UL (ref 1.5–7.7)
NEUTROPHILS # BLD AUTO: 6 X10(3) UL (ref 1.5–7.7)
NEUTROPHILS NFR BLD AUTO: 62.1 %
NONHDLC SERPL-MCNC: 76 MG/DL (ref ?–130)
OSMOLALITY SERPL CALC.SUM OF ELEC: 291 MOSM/KG (ref 275–295)
PATIENT FASTING Y/N/NP: YES
PATIENT FASTING Y/N/NP: YES
PLATELET # BLD AUTO: 287 10(3)UL (ref 150–450)
POTASSIUM SERPL-SCNC: 4.1 MMOL/L (ref 3.5–5.1)
RBC # BLD AUTO: 4.67 X10(6)UL
SODIUM SERPL-SCNC: 139 MMOL/L (ref 136–145)
TRIGL SERPL-MCNC: 102 MG/DL (ref 30–149)
VLDLC SERPL CALC-MCNC: 15 MG/DL (ref 0–30)
WBC # BLD AUTO: 9.7 X10(3) UL (ref 4–11)

## 2021-07-06 PROCEDURE — 80061 LIPID PANEL: CPT

## 2021-07-06 PROCEDURE — 82043 UR ALBUMIN QUANTITATIVE: CPT

## 2021-07-06 PROCEDURE — 3060F POS MICROALBUMINURIA REV: CPT | Performed by: PHYSICIAN ASSISTANT

## 2021-07-06 PROCEDURE — 3044F HG A1C LEVEL LT 7.0%: CPT | Performed by: PHYSICIAN ASSISTANT

## 2021-07-06 PROCEDURE — 85025 COMPLETE CBC W/AUTO DIFF WBC: CPT

## 2021-07-06 PROCEDURE — 80053 COMPREHEN METABOLIC PANEL: CPT

## 2021-07-06 PROCEDURE — 36415 COLL VENOUS BLD VENIPUNCTURE: CPT

## 2021-07-06 PROCEDURE — 83036 HEMOGLOBIN GLYCOSYLATED A1C: CPT

## 2021-07-06 PROCEDURE — 82570 ASSAY OF URINE CREATININE: CPT

## 2021-07-12 ENCOUNTER — TELEPHONE (OUTPATIENT)
Dept: FAMILY MEDICINE CLINIC | Facility: CLINIC | Age: 76
End: 2021-07-12

## 2021-07-12 DIAGNOSIS — R97.20 ELEVATED PSA: ICD-10-CM

## 2021-07-12 DIAGNOSIS — M53.82 WEAKNESS OF NECK: Primary | ICD-10-CM

## 2021-07-12 DIAGNOSIS — G56.23 ULNAR NEUROPATHY OF BOTH UPPER EXTREMITIES: ICD-10-CM

## 2021-07-13 NOTE — TELEPHONE ENCOUNTER
Referral request [facility] [address]   [facility] therapy collar    Phone number: [phone]  Fax number: [phone]    Office notes from Dr. Jacy Abraham M.D.6/30/21    Neck and hands - ulnar neuropathy.   Seeing spine (barbara) and ne

## 2021-07-13 NOTE — TELEPHONE ENCOUNTER
Referral request Dr. Yvette Morales M.D., Urology    Elevated PSA    Lab results from 7/6/2021  Prostate Specific Antigen Screen   <=4.00 ng/mL 6. 71High

## 2021-07-15 ENCOUNTER — TELEPHONE (OUTPATIENT)
Dept: CASE MANAGEMENT | Age: 76
End: 2021-07-15

## 2021-07-15 DIAGNOSIS — E11.40 TYPE 2 DIABETES MELLITUS WITH DIABETIC NEUROPATHY, UNSPECIFIED WHETHER LONG TERM INSULIN USE (HCC): Primary | ICD-10-CM

## 2021-07-16 ENCOUNTER — PATIENT MESSAGE (OUTPATIENT)
Dept: FAMILY MEDICINE CLINIC | Facility: CLINIC | Age: 76
End: 2021-07-16

## 2021-07-16 DIAGNOSIS — M62.81 MUSCLE WEAKNESS (GENERALIZED): ICD-10-CM

## 2021-07-16 DIAGNOSIS — M53.82 WEAKNESS OF NECK: Primary | ICD-10-CM

## 2021-07-16 DIAGNOSIS — M47.22 CERVICAL SPONDYLOSIS WITH RADICULOPATHY: ICD-10-CM

## 2021-07-16 DIAGNOSIS — G56.23 ULNAR NEUROPATHY OF BOTH UPPER EXTREMITIES: ICD-10-CM

## 2021-07-16 NOTE — TELEPHONE ENCOUNTER
Can we place referral  Longstanding neuro issues, seen our neurosurgery team  Not sure if Dr. Linda Norman is in network or not.  Looks like he might have ties with [facility]

## 2021-07-16 NOTE — TELEPHONE ENCOUNTER
From: Thee Haile  To: Kourtney Samano MD  Sent: 7/16/2021 1:45 PM CDT  Subject: Referral Request    A friend got treated for 4 pinched nerves in his neck by Dr. Ranjeet Castillo , Armando Murray [phone]. I would like to consult with this doctor, a neuro surgeon.

## 2021-07-19 NOTE — TELEPHONE ENCOUNTER
Patient returned call and requests Dr Audie Arevalo contact information be sent to him via My Chart. Msg sent.

## 2021-07-19 NOTE — TELEPHONE ENCOUNTER
Patient is due for DM eye exam.   Referral approved for Dr Connor Herndon. Left message to call back [phone].

## 2021-07-20 NOTE — TELEPHONE ENCOUNTER
Referral request Dr. Arsh Jo M.D.     Longstanding neuro issues, seen our neurosurgery team    Patient asking for second opinion

## 2021-07-21 ENCOUNTER — TELEPHONE (OUTPATIENT)
Dept: FAMILY MEDICINE CLINIC | Facility: CLINIC | Age: 76
End: 2021-07-21

## 2021-07-21 DIAGNOSIS — E11.40 CONTROLLED TYPE 2 DIABETES WITH NEUROPATHY (HCC): Primary | ICD-10-CM

## 2021-07-21 NOTE — TELEPHONE ENCOUNTER
TC from patient he would like to go back to original ophthalmologist he saw Dr. Terry Villatoro M.D.

## 2021-07-21 NOTE — TELEPHONE ENCOUNTER
Referral request Dr. Denver Lacer, M.D. Office notes from Dr. Maren Shields M.D. 6/30/21  DM - traditionally controlled. Good numbers at home. CGM. 146 at visit today. Machine estimates A1C at 6.8.     Taking trulicity, metformin, novolog 10 i

## 2021-07-23 ENCOUNTER — TELEPHONE (OUTPATIENT)
Dept: FAMILY MEDICINE CLINIC | Facility: CLINIC | Age: 76
End: 2021-07-23

## 2021-07-23 DIAGNOSIS — E11.40 TYPE 2 DIABETES MELLITUS WITH DIABETIC NEUROPATHY, UNSPECIFIED WHETHER LONG TERM INSULIN USE (HCC): Primary | ICD-10-CM

## 2021-07-23 NOTE — TELEPHONE ENCOUNTER
Medication refilled per protocol.      Requested Prescriptions     Signed Prescriptions Disp Refills   • METFORMIN HCL 1000 MG Oral Tab 180 tablet 0     Sig: TAKE 1 TABLET(1000 MG) BY MOUTH TWICE DAILY WITH MEALS     Authorizing Provider: Alan Chan

## 2021-07-23 NOTE — TELEPHONE ENCOUNTER
Pt requesting refill on Metformin. Pt is completely out of medication. Asking to refill for 30 days while ins approves 90 day supply. Please notify pt once refill has been submitted.

## 2021-07-26 ENCOUNTER — TELEPHONE (OUTPATIENT)
Dept: FAMILY MEDICINE CLINIC | Facility: CLINIC | Age: 76
End: 2021-07-26

## 2021-07-26 DIAGNOSIS — Z12.11 ENCOUNTER FOR SCREENING COLONOSCOPY: Primary | ICD-10-CM

## 2021-08-05 ENCOUNTER — TELEPHONE (OUTPATIENT)
Dept: SURGERY | Facility: CLINIC | Age: 76
End: 2021-08-05

## 2021-08-05 ENCOUNTER — OFFICE VISIT (OUTPATIENT)
Dept: SURGERY | Facility: CLINIC | Age: 76
End: 2021-08-05

## 2021-08-05 DIAGNOSIS — R97.20 ELEVATED PSA: ICD-10-CM

## 2021-08-05 DIAGNOSIS — R97.20 ELEVATED PSA: Primary | ICD-10-CM

## 2021-08-05 DIAGNOSIS — N40.0 ENLARGED PROSTATE: Primary | ICD-10-CM

## 2021-08-05 PROCEDURE — 99203 OFFICE O/P NEW LOW 30 MIN: CPT | Performed by: UROLOGY

## 2021-08-05 NOTE — PROGRESS NOTES
Rooming Clinician:     Sheryle Shows is a 77year old male.   Patient presents with:  Consult  elevated psa: 6.71 on 7/6    Elevated PSA:  Current PSA: 6.71 on 7/6  PSA History: None  Free PSA: None  Previous Biopsy: No    Stream: Weak at AM  Nocturia Application topically as needed. • Insulin Pen Needle (BD PEN NEEDLE HERMILA U/F) 32G X 4 MM Does not apply Misc 1 lancet as needed.      • Glucose Blood (GRECIA CONTOUR NEXT TEST) In Vitro Strip TEST TID  3     No Known Allergies   Past Medical History: motion without distress  CARDIO: normal peripheral perfusion  ABDOMEN: no masses,  tenderness, or hernia  : The penis is uncircumcised. Urethral meatus is patent without discharge. The testicles are descended bilaterally and are normal shape and size. biopsy tissue. Patient does have significant physical limitation due to his neuropathy. Since he is relatively asymptomatic I might consider monitoring PSA over the course of time pending results of the repeat.   We talked about the risks and complication

## 2021-08-06 ENCOUNTER — LAB ENCOUNTER (OUTPATIENT)
Dept: LAB | Age: 76
End: 2021-08-06
Attending: UROLOGY
Payer: COMMERCIAL

## 2021-08-06 DIAGNOSIS — N40.0 ENLARGED PROSTATE: ICD-10-CM

## 2021-08-06 LAB
BILIRUB UR QL STRIP.AUTO: NEGATIVE
CLARITY UR REFRACT.AUTO: CLEAR
COLOR UR AUTO: YELLOW
GLUCOSE UR STRIP.AUTO-MCNC: NEGATIVE MG/DL
KETONES UR STRIP.AUTO-MCNC: NEGATIVE MG/DL
NITRITE UR QL STRIP.AUTO: NEGATIVE
PH UR STRIP.AUTO: 6 [PH] (ref 5–8)
PROT UR STRIP.AUTO-MCNC: 100 MG/DL
RBC UR QL AUTO: NEGATIVE
SP GR UR STRIP.AUTO: 1.01 (ref 1–1.03)
UROBILINOGEN UR STRIP.AUTO-MCNC: <2 MG/DL

## 2021-08-06 PROCEDURE — 81001 URINALYSIS AUTO W/SCOPE: CPT

## 2021-08-20 ENCOUNTER — LAB ENCOUNTER (OUTPATIENT)
Dept: LAB | Facility: HOSPITAL | Age: 76
End: 2021-08-20
Attending: INTERNAL MEDICINE
Payer: COMMERCIAL

## 2021-08-20 ENCOUNTER — HOSPITAL ENCOUNTER (OUTPATIENT)
Dept: CT IMAGING | Facility: HOSPITAL | Age: 76
Discharge: HOME | End: 2021-08-20
Attending: NEUROLOGICAL SURGERY
Payer: COMMERCIAL

## 2021-08-20 DIAGNOSIS — Z01.818 PRE-OP TESTING: ICD-10-CM

## 2021-08-20 DIAGNOSIS — M47.892 OTHER SPONDYLOSIS, CERVICAL REGION: ICD-10-CM

## 2021-08-20 PROCEDURE — 72125 CT NECK SPINE W/O DYE: CPT | Performed by: NEUROLOGICAL SURGERY

## 2021-08-21 LAB — SARS-COV-2 RNA RESP QL NAA+PROBE: NOT DETECTED

## 2021-08-23 ENCOUNTER — HOSPITAL ENCOUNTER (OUTPATIENT)
Facility: HOSPITAL | Age: 76
Setting detail: HOSPITAL OUTPATIENT SURGERY
Discharge: HOME | End: 2021-08-23
Attending: INTERNAL MEDICINE | Admitting: INTERNAL MEDICINE
Payer: COMMERCIAL

## 2021-08-23 ENCOUNTER — ANESTHESIA EVENT (OUTPATIENT)
Dept: ENDOSCOPY | Facility: HOSPITAL | Age: 76
End: 2021-08-23
Payer: COMMERCIAL

## 2021-08-23 ENCOUNTER — ANESTHESIA (OUTPATIENT)
Dept: ENDOSCOPY | Facility: HOSPITAL | Age: 76
End: 2021-08-23
Payer: COMMERCIAL

## 2021-08-23 VITALS
HEART RATE: 61 BPM | RESPIRATION RATE: 18 BRPM | DIASTOLIC BLOOD PRESSURE: 59 MMHG | OXYGEN SATURATION: 100 % | SYSTOLIC BLOOD PRESSURE: 166 MMHG | TEMPERATURE: 98 F

## 2021-08-23 DIAGNOSIS — Z86.010 HISTORY OF COLON POLYPS: ICD-10-CM

## 2021-08-23 LAB
GLUCOSE BLD-MCNC: 95 MG/DL (ref 70–99)
GLUCOSE BLD-MCNC: 96 MG/DL (ref 70–99)

## 2021-08-23 PROCEDURE — 0DBK8ZX EXCISION OF ASCENDING COLON, VIA NATURAL OR ARTIFICIAL OPENING ENDOSCOPIC, DIAGNOSTIC: ICD-10-PCS | Performed by: INTERNAL MEDICINE

## 2021-08-23 PROCEDURE — 0DBM8ZX EXCISION OF DESCENDING COLON, VIA NATURAL OR ARTIFICIAL OPENING ENDOSCOPIC, DIAGNOSTIC: ICD-10-PCS | Performed by: INTERNAL MEDICINE

## 2021-08-23 PROCEDURE — 3E0H8GC INTRODUCTION OF OTHER THERAPEUTIC SUBSTANCE INTO LOWER GI, VIA NATURAL OR ARTIFICIAL OPENING ENDOSCOPIC: ICD-10-PCS | Performed by: INTERNAL MEDICINE

## 2021-08-23 PROCEDURE — 82962 GLUCOSE BLOOD TEST: CPT

## 2021-08-23 PROCEDURE — 0DBN8ZX EXCISION OF SIGMOID COLON, VIA NATURAL OR ARTIFICIAL OPENING ENDOSCOPIC, DIAGNOSTIC: ICD-10-PCS | Performed by: INTERNAL MEDICINE

## 2021-08-23 PROCEDURE — 88305 TISSUE EXAM BY PATHOLOGIST: CPT | Performed by: INTERNAL MEDICINE

## 2021-08-23 RX ORDER — SODIUM CHLORIDE, SODIUM LACTATE, POTASSIUM CHLORIDE, CALCIUM CHLORIDE 600; 310; 30; 20 MG/100ML; MG/100ML; MG/100ML; MG/100ML
INJECTION, SOLUTION INTRAVENOUS CONTINUOUS
Status: DISCONTINUED | OUTPATIENT
Start: 2021-08-23 | End: 2021-08-23

## 2021-08-23 RX ORDER — LIDOCAINE HYDROCHLORIDE 10 MG/ML
INJECTION, SOLUTION EPIDURAL; INFILTRATION; INTRACAUDAL; PERINEURAL AS NEEDED
Status: DISCONTINUED | OUTPATIENT
Start: 2021-08-23 | End: 2021-08-23 | Stop reason: SURG

## 2021-08-23 RX ADMIN — LIDOCAINE HYDROCHLORIDE 25 MG: 10 INJECTION, SOLUTION EPIDURAL; INFILTRATION; INTRACAUDAL; PERINEURAL at 14:34:00

## 2021-08-23 RX ADMIN — SODIUM CHLORIDE, SODIUM LACTATE, POTASSIUM CHLORIDE, CALCIUM CHLORIDE: 600; 310; 30; 20 INJECTION, SOLUTION INTRAVENOUS at 14:23:00

## 2021-08-23 NOTE — OPERATIVE REPORT
Operative Report-Colonoscopy    PREOPERATIVE DIAGNOSIS/INDICATION: Surveillance     POSTOPERTATIVE DIAGNOSIS: Colon polyps, hemorrhoids, diverticulosis     PROCEDURE PERFORMED: COLONOSCOPY    INFORMED CONSEN biopsies.    - Repeat colonoscopy in 3 years.     Gisela Rodriguez MD  8/23/2021  3:14 PM

## 2021-08-23 NOTE — ANESTHESIA POSTPROCEDURE EVALUATION
[address] [facility] Patient Status:  Hospital Outpatient Surgery   Age/Gender 77year old male MRN [id number]   Location [id number] [place] [address] Attending Pedro Gunn, [address] [facility] [address] Day # 0 PCP MD Dara Pace

## 2021-08-23 NOTE — H&P
Sukhjinder  Patient Status:  Hospital Outpatient Surgery    1945 MRN [id number]   Location [address] [place] [address] Attending Jayla Wesley MD   [facility] Day # 0 PCP Niles Lorenzo MD years ago. He smoked 0.00 packs per day for 40.00 years. He has never used smokeless tobacco. He reports that he does not drink alcohol and does not use drugs.   No Known Allergies    lactated ringers infusion, , Intravenous, Continuous      No current outp moles, rash, flushing, change in hair or nails. Bone/joint: Denies arthritis, back pain, joint pain, osteoporosis. Heme/Lymphatic: Denies easy bruising, anemia, excessive bleeding, enlarging or painful lymph nodes.   Allergy: Denies medication allergy, la

## 2021-08-23 NOTE — ANESTHESIA PREPROCEDURE EVALUATION
PRE-OP EVALUATION    Patient Name: Wendy Marin    Admit Diagnosis: History of colon polyps [Z86.010]    Pre-op Diagnosis: History of colon polyps [Z86.010]    COLONOSCOPY    Anesthesia Procedure: COLONOSCOPY (N/A )    Surgeon(s) and Role:     * Pand Years since quittin.2      Smokeless tobacco: Never Used    Alcohol use: No      Drug use: Unknown     Available pre-op labs reviewed.   Lab Results   Component Value Date    WBC 9.7 2021    RBC 4.67 2021    HGB 14.1 2021    HCT 4

## 2021-09-01 ENCOUNTER — LAB ENCOUNTER (OUTPATIENT)
Dept: LAB | Age: 76
End: 2021-09-01
Attending: UROLOGY
Payer: COMMERCIAL

## 2021-09-01 ENCOUNTER — TELEPHONE (OUTPATIENT)
Dept: SURGERY | Facility: CLINIC | Age: 76
End: 2021-09-01

## 2021-09-01 DIAGNOSIS — R97.20 ELEVATED PSA: ICD-10-CM

## 2021-09-01 LAB
PSA FREE MFR SERPL: 22 %
PSA FREE SERPL-MCNC: 1.43 NG/ML
PSA SERPL-MCNC: 6.51 NG/ML (ref ?–4)

## 2021-09-01 PROCEDURE — 84154 ASSAY OF PSA FREE: CPT

## 2021-09-01 PROCEDURE — 36415 COLL VENOUS BLD VENIPUNCTURE: CPT

## 2021-09-01 PROCEDURE — 84153 ASSAY OF PSA TOTAL: CPT

## 2021-09-01 NOTE — TELEPHONE ENCOUNTER
Pt called stating pt has an appointment on 9-3-21. Pt has question. Will pt be examined? Pt wants to speak to the nurse.    Please call

## 2021-09-01 NOTE — TELEPHONE ENCOUNTER
This RN called patient in response to his call:     \"Pt called stating pt has an appointment on 9-3-21. Pt has question. Will pt be examined? Pt wants to speak to the nurse. Please call \"    Note, this patient was last seen by Dr Ana Paula Gusman on 8/5/21.

## 2021-09-03 ENCOUNTER — OFFICE VISIT (OUTPATIENT)
Dept: SURGERY | Facility: CLINIC | Age: 76
End: 2021-09-03

## 2021-09-03 DIAGNOSIS — N40.0 ENLARGED PROSTATE: ICD-10-CM

## 2021-09-03 DIAGNOSIS — R97.20 ELEVATED PSA: ICD-10-CM

## 2021-09-03 DIAGNOSIS — R82.90 URINE FINDING: Primary | ICD-10-CM

## 2021-09-03 PROCEDURE — 51798 US URINE CAPACITY MEASURE: CPT | Performed by: UROLOGY

## 2021-09-03 PROCEDURE — 99213 OFFICE O/P EST LOW 20 MIN: CPT | Performed by: UROLOGY

## 2021-09-03 NOTE — PROGRESS NOTES
Rooming Clinician:     Francoise Berger is a 77year old male. Patient presents with:   Follow - Up: follow up with PSA result        HPI:     Patient returns with his spouse for follow-up examination because of an elevated PSA initially up to this point Arthritis 2020   • Back pain 2020   • Calculus of kidney    • Cataract    • Diabetes ([facility] [address]    • Diabetes mellitus ([facility] [address] 1989   • Disorder of prostate 2019   • Eye disease cataract 2015    both eyes   • High blood pressure    • High cholesterol    • Leaking for this visit.   GENERAL: well developed, well nourished,in no apparent distress  SKIN: no rashes,no suspicious lesions  HEENT: atraumatic, normocephalic,ears and throat are clear  NECK: supple  LUNGS: normal respiratory motion without distress  CARDIO: no

## 2021-09-17 ENCOUNTER — TELEPHONE (OUTPATIENT)
Dept: FAMILY MEDICINE CLINIC | Facility: CLINIC | Age: 76
End: 2021-09-17

## 2021-09-17 NOTE — TELEPHONE ENCOUNTER
Patient informed is due for DM eye exam, states has an appointment with Dr. Alison Collier on 9/21/21.

## 2021-10-04 ENCOUNTER — TELEPHONE (OUTPATIENT)
Dept: FAMILY MEDICINE CLINIC | Facility: CLINIC | Age: 76
End: 2021-10-04

## 2021-10-04 DIAGNOSIS — R80.9 TYPE 2 DIABETES MELLITUS WITH MICROALBUMINURIA, WITH LONG-TERM CURRENT USE OF INSULIN (HCC): ICD-10-CM

## 2021-10-04 DIAGNOSIS — Z79.4 TYPE 2 DIABETES MELLITUS WITH MICROALBUMINURIA, WITH LONG-TERM CURRENT USE OF INSULIN (HCC): ICD-10-CM

## 2021-10-04 DIAGNOSIS — E11.29 TYPE 2 DIABETES MELLITUS WITH MICROALBUMINURIA, WITH LONG-TERM CURRENT USE OF INSULIN (HCC): ICD-10-CM

## 2021-10-04 DIAGNOSIS — N12 PYELONEPHRITIS: ICD-10-CM

## 2021-10-04 RX ORDER — ATORVASTATIN CALCIUM 10 MG/1
10 TABLET, FILM COATED ORAL DAILY
Qty: 90 TABLET | Refills: 1 | Status: SHIPPED | OUTPATIENT
Start: 2021-10-04 | End: 2021-11-18

## 2021-10-04 RX ORDER — TAMSULOSIN HYDROCHLORIDE 0.4 MG/1
0.4 CAPSULE ORAL EVERY MORNING
Qty: 90 CAPSULE | Refills: 1 | Status: SHIPPED | OUTPATIENT
Start: 2021-10-04 | End: 2021-12-17

## 2021-10-04 NOTE — TELEPHONE ENCOUNTER
Called Dr Arelis Rutledge office to please fax DM eye exam from 9/21/2021 to the office at [phone].

## 2021-10-04 NOTE — TELEPHONE ENCOUNTER
Patient called and wants this sent to mail order [address] [facility] [place]

## 2021-10-18 DIAGNOSIS — Z79.4 TYPE 2 DIABETES MELLITUS WITH MICROALBUMINURIA, WITH LONG-TERM CURRENT USE OF INSULIN (HCC): ICD-10-CM

## 2021-10-18 DIAGNOSIS — E11.29 TYPE 2 DIABETES MELLITUS WITH MICROALBUMINURIA, WITH LONG-TERM CURRENT USE OF INSULIN (HCC): ICD-10-CM

## 2021-10-18 DIAGNOSIS — R80.9 TYPE 2 DIABETES MELLITUS WITH MICROALBUMINURIA, WITH LONG-TERM CURRENT USE OF INSULIN (HCC): ICD-10-CM

## 2021-10-18 RX ORDER — DULAGLUTIDE 1.5 MG/.5ML
1.5 INJECTION, SOLUTION SUBCUTANEOUS WEEKLY
Qty: 6 ML | Refills: 3 | Status: SHIPPED | OUTPATIENT
Start: 2021-10-18 | End: 2022-01-17

## 2021-10-18 NOTE — TELEPHONE ENCOUNTER
Please send the Trulicty and the Insulin Glargine to [facility]. Pt is completely out he needs 90 days sent.

## 2021-10-19 ENCOUNTER — TELEPHONE (OUTPATIENT)
Dept: FAMILY MEDICINE CLINIC | Facility: CLINIC | Age: 76
End: 2021-10-19

## 2021-10-19 DIAGNOSIS — E11.40 TYPE 2 DIABETES MELLITUS WITH DIABETIC NEUROPATHY, UNSPECIFIED WHETHER LONG TERM INSULIN USE (HCC): ICD-10-CM

## 2021-10-19 RX ORDER — INSULIN ASPART 100 [IU]/ML
20 INJECTION, SOLUTION INTRAVENOUS; SUBCUTANEOUS 2 TIMES DAILY
Qty: 36 ML | Refills: 1 | Status: SHIPPED | OUTPATIENT
Start: 2021-10-19 | End: 2021-10-25 | Stop reason: SINTOL

## 2021-10-19 NOTE — TELEPHONE ENCOUNTER
Requested Prescriptions     Pending Prescriptions Disp Refills   • insulin aspart (NOVOLOG) 100 UNIT/ML Subcutaneous Solution 36 mL 1     Sig: Inject 20 Units into the skin 2 (two) times daily.    • metFORMIN HCl 1000 MG Oral Tab 180 tablet 0     Sig: Take

## 2021-10-19 NOTE — TELEPHONE ENCOUNTER
Pt needs his METFORMIN HCL 1000 MG Oral Tab and     insulin aspart (NOVOLOG) 100 UNIT/ML Subcutaneous Solution  He will be out of medication in a couple days. This needs to go to his mail order.

## 2021-10-25 ENCOUNTER — TELEPHONE (OUTPATIENT)
Dept: FAMILY MEDICINE CLINIC | Facility: CLINIC | Age: 76
End: 2021-10-25

## 2021-10-25 RX ORDER — KETOCONAZOLE 20 MG/ML
SHAMPOO TOPICAL
COMMUNITY
Start: 2021-10-23

## 2021-10-25 NOTE — TELEPHONE ENCOUNTER
[facility] mail order calling about the Rx insulin aspart (NOVOLOG) 100 UNIT/ML Subcutaneous Solution  Pt called pharmacy and told them that she needed the Novolog flex pen. They will need a new Rx for that.

## 2021-10-25 NOTE — TELEPHONE ENCOUNTER
Mail order is stating patient only uses brand Novolog Flexpen. Cannot tolerate generic Novolog.     Routed to Dr. Micah Pope [facility] pended for review

## 2021-10-26 RX ORDER — INSULIN ASPART 100 [IU]/ML
20 INJECTION, SOLUTION INTRAVENOUS; SUBCUTANEOUS 2 TIMES DAILY
Qty: 36 ML | Refills: 1 | Status: SHIPPED | OUTPATIENT
Start: 2021-10-26 | End: 2022-01-17

## 2021-10-27 ENCOUNTER — TELEPHONE (OUTPATIENT)
Dept: FAMILY MEDICINE CLINIC | Facility: CLINIC | Age: 76
End: 2021-10-27

## 2021-10-27 DIAGNOSIS — E11.8 DIABETIC COMPLICATION (HCC): Primary | ICD-10-CM

## 2021-10-27 NOTE — TELEPHONE ENCOUNTER
Referral request DME    [facility]  Fax number: [phone] quantity 3 boxes   quantity 1 box    Office notes from Dr. Gopal Quinones M.D. 6/30/21  DM - traditionally controlled. Good numbers at home. CGM. 146 at visit today.   Machine estimates A1

## 2021-10-27 NOTE — TELEPHONE ENCOUNTER
Patient is going to call us back to schedule he needs a med check appointment he has to check with his .

## 2021-10-29 ENCOUNTER — TELEPHONE (OUTPATIENT)
Dept: FAMILY MEDICINE CLINIC | Facility: CLINIC | Age: 76
End: 2021-10-29

## 2021-10-29 NOTE — TELEPHONE ENCOUNTER
Spoke to pt's spouse, Raza Gilman. She would like to speak to a nurse regarding her  received incorrect medication from [facility] -  He received in error 3 bottles of Novolog and should have received pens.   Pt was charged $60.44 and they have never exp

## 2021-10-29 NOTE — TELEPHONE ENCOUNTER
Called and talked to wife and they received vials not flexpen's, and they are being charged for this despite her calling to correct this.  I called [facility] and talke dto the pharmacist and she shows they sent out pens and I assured her that they receive

## 2021-11-02 ENCOUNTER — TELEPHONE (OUTPATIENT)
Dept: SCHEDULING | Age: 76
End: 2021-11-02

## 2021-11-02 NOTE — TELEPHONE ENCOUNTER
Called Dr Sweetie Workman office to please fax DM eye exam from 9/21/2021 to Dr David Heck at [phone].

## 2021-11-08 NOTE — TELEPHONE ENCOUNTER
I spoke with Patient he said thank you  for the reminder he will call us back he still needs time to arrange his ride. He needs a medication check appointment.

## 2021-11-10 ENCOUNTER — TELEPHONE (OUTPATIENT)
Dept: FAMILY MEDICINE CLINIC | Facility: CLINIC | Age: 76
End: 2021-11-10

## 2021-11-10 DIAGNOSIS — E11.40 TYPE 2 DIABETES MELLITUS WITH DIABETIC NEUROPATHY, UNSPECIFIED WHETHER LONG TERM INSULIN USE (HCC): Primary | ICD-10-CM

## 2021-11-10 RX ORDER — BLOOD SUGAR DIAGNOSTIC
STRIP MISCELLANEOUS
Qty: 200 STRIP | Refills: 3 | Status: SHIPPED | OUTPATIENT
Start: 2021-11-10 | End: 2021-11-18

## 2021-11-10 NOTE — TELEPHONE ENCOUNTER
They were given a Contour next ez monitor from our office  and it only came with 10 strips they are asking for more strips he checks his blood sugar twice a day.  Wife is asking to keep this cost effective as possible, she's worrying about money

## 2021-11-10 NOTE — TELEPHONE ENCOUNTER
Pt waiting for insurance approval for Dexcom and has no testing supplies.   Advised Pt he can  a glucose monitor- at  and use while waiting for approval. Advised Pt to f/u with Carl Humphrey and his HMO insurance

## 2021-11-18 ENCOUNTER — OFFICE VISIT (OUTPATIENT)
Dept: FAMILY MEDICINE CLINIC | Facility: CLINIC | Age: 76
End: 2021-11-18

## 2021-11-18 VITALS
BODY MASS INDEX: 31.83 KG/M2 | DIASTOLIC BLOOD PRESSURE: 72 MMHG | HEIGHT: 62 IN | TEMPERATURE: 98 F | WEIGHT: 173 LBS | RESPIRATION RATE: 16 BRPM | HEART RATE: 70 BPM | SYSTOLIC BLOOD PRESSURE: 150 MMHG

## 2021-11-18 DIAGNOSIS — M47.22 CERVICAL SPONDYLOSIS WITH RADICULOPATHY: ICD-10-CM

## 2021-11-18 DIAGNOSIS — G12.21 ALS (AMYOTROPHIC LATERAL SCLEROSIS) (HCC): Primary | ICD-10-CM

## 2021-11-18 DIAGNOSIS — M62.81 MUSCLE WEAKNESS (GENERALIZED): ICD-10-CM

## 2021-11-18 DIAGNOSIS — G62.89 MOTOR POLYNEUROPATHY: ICD-10-CM

## 2021-11-18 PROBLEM — M47.812 CERVICAL SPONDYLOSIS: Status: ACTIVE | Noted: 2021-08-11

## 2021-11-18 PROCEDURE — 90662 IIV NO PRSV INCREASED AG IM: CPT | Performed by: PHYSICIAN ASSISTANT

## 2021-11-18 PROCEDURE — 3078F DIAST BP <80 MM HG: CPT | Performed by: PHYSICIAN ASSISTANT

## 2021-11-18 PROCEDURE — 99214 OFFICE O/P EST MOD 30 MIN: CPT | Performed by: PHYSICIAN ASSISTANT

## 2021-11-18 PROCEDURE — 90471 IMMUNIZATION ADMIN: CPT | Performed by: PHYSICIAN ASSISTANT

## 2021-11-18 PROCEDURE — 3077F SYST BP >= 140 MM HG: CPT | Performed by: PHYSICIAN ASSISTANT

## 2021-11-18 PROCEDURE — 3008F BODY MASS INDEX DOCD: CPT | Performed by: PHYSICIAN ASSISTANT

## 2021-11-19 NOTE — PROGRESS NOTES
Patient presents with:  Medical Question       HISTORY OF PRESENT ILLNESS  Tariq Elena is a 77year old male who presents for follow up. Patient has been working on conclusive diagnosis for his ongoing weakness.  He has been diagnosed by neurologist tablet, Rfl: 0  •  Insulin Glargine, 1 Unit Dial, 300 UNIT/ML Subcutaneous Solution Pen-injector, Inject 60 Units/day into the skin daily. , Disp: 18 mL, Rfl: 3  •  tamsulosin (FLOMAX) cap, Take 1 capsule (0.4 mg total) by mouth every morning., Disp: 90 cap RIGHT ULNAR NERVE TRANSPOSITION   • Other  01/2020    drainage of abscess on back   • Other  02/14/2020    Left ulnar nerve decompression and LEFT carpal tunnel release. ULNAR NERVE TRANSPOSITION. WRIST CARPAL TUNNEL RELEASE.         Social History    Tobac rests, anti tip wheels, and seat/back cushions. Diabetes has been under great control. Will wait another 3 months to get labs for check. Monitor at home for hypoglycemia. Encouraged him to keep his brain active with puzzles etc and things that he enjoys.

## 2021-11-22 ENCOUNTER — TELEPHONE (OUTPATIENT)
Dept: FAMILY MEDICINE CLINIC | Facility: CLINIC | Age: 76
End: 2021-11-22

## 2021-11-22 DIAGNOSIS — M47.22 CERVICAL SPONDYLOSIS WITH RADICULOPATHY: Primary | ICD-10-CM

## 2021-11-22 DIAGNOSIS — G12.21 AMYOTROPHIC LATERAL SCLEROSIS (HCC): ICD-10-CM

## 2021-11-22 DIAGNOSIS — M62.81 MUSCLE WEAKNESS (GENERALIZED): ICD-10-CM

## 2021-11-22 DIAGNOSIS — G62.89 DISEASE RELATED PERIPHERAL NEUROPATHY: ICD-10-CM

## 2021-11-22 NOTE — TELEPHONE ENCOUNTER
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

## 2021-11-23 ENCOUNTER — TELEPHONE (OUTPATIENT)
Dept: FAMILY MEDICINE CLINIC | Facility: CLINIC | Age: 76
End: 2021-11-23

## 2021-11-23 NOTE — TELEPHONE ENCOUNTER
Ashley Champion could you update your notes from 11/18/21 for Mr. Yee Alonso to include the following information required by insurance for his wheelchair. Thank you    1. What is the patient's mobility limitation        2.   Can the patient's mobility limitations be s

## 2021-11-28 NOTE — TELEPHONE ENCOUNTER
I think I answered the questions- please let me know if you need anything else.  All located in \"plan\" section

## 2021-11-29 ENCOUNTER — TELEPHONE (OUTPATIENT)
Dept: FAMILY MEDICINE CLINIC | Facility: CLINIC | Age: 76
End: 2021-11-29

## 2021-11-29 NOTE — TELEPHONE ENCOUNTER
teresa YANG from [facility] has itchiness to right arm pit also on groin and the belt area just itchy skin dry no open areas. Want an ointment that is not a steroid to deal with the itchiness.    They also asked for a wheelchair with neck support,a shower chair, non

## 2021-11-29 NOTE — TELEPHONE ENCOUNTER
There are no ointments I can prescribe without steroids  Would have him try aveeno and other thick moisturizing creams.       For DME, might need to forward this to [facility]

## 2021-11-30 NOTE — TELEPHONE ENCOUNTER
Spoke with [facility] today to verify they are taking care of Durable Medical Equipment order for the patient's needs.

## 2021-11-30 NOTE — TELEPHONE ENCOUNTER
Spoke with Jessica Barba. They have been out to evaluate the patient both nurse and PT. They are taking over ordering the needed supplies for the patient.  Some items the wife of the patient has asked for are not covered by their insurance but

## 2021-12-01 ENCOUNTER — TELEPHONE (OUTPATIENT)
Dept: FAMILY MEDICINE CLINIC | Facility: CLINIC | Age: 76
End: 2021-12-01

## 2021-12-01 NOTE — TELEPHONE ENCOUNTER
Referral request Dr. Tanmay Brooks M.D. Pulmonary  [facility]    Diagnosis: G12.21    Office notes from Dr. Shona Gaxiola M.D. 9/21/21  Addendum: 11/17/2021     I personally saw and physically examined the patient on 9/21/21 with high resulting from suspected ALS level of risk. I agree with the housestaff's assessment and plan of care. Patient presents with asymmetrical onset of weakness without sensory loss or pain, exam shows severe atrophy in the RUE>LUE, head drop, tongue fasciculation, hyperreflexia present in the LEs, an extensive imaging studies unrevealing. The presentation including progressive course are highly concerning for ALS. This was discussed with patient and his wife. 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 The patient needs power wheelchair for ADL and he is in danger of falls. His case was discussed with the patient, wife and son who is an internist. The patient is DNR and currently is not considering PEG tube. I started him on riluzole and referred to Pulmonary.

## 2021-12-07 ENCOUNTER — TELEPHONE (OUTPATIENT)
Dept: FAMILY MEDICINE CLINIC | Facility: CLINIC | Age: 76
End: 2021-12-07

## 2021-12-07 DIAGNOSIS — G12.21 ALS (AMYOTROPHIC LATERAL SCLEROSIS) (HCC): Primary | ICD-10-CM

## 2021-12-07 NOTE — TELEPHONE ENCOUNTER
Andrew Bueno could you please add to your dictation from 11/18/21    1.  Please describe patient's mobility limitations and why this diagnosis can not be solved with a cane or walker    Is the patient willing and capable to use the manual wheelchair or does he ha

## 2021-12-08 ENCOUNTER — TELEPHONE (OUTPATIENT)
Dept: FAMILY MEDICINE CLINIC | Facility: CLINIC | Age: 76
End: 2021-12-08

## 2021-12-08 DIAGNOSIS — I43 DILATED CARDIOMYOPATHY SECONDARY TO NEUROMUSCULAR DISORDERS (HCC): ICD-10-CM

## 2021-12-08 DIAGNOSIS — G12.21 AMYOTROPHIC LATERAL SCLEROSIS (HCC): Primary | ICD-10-CM

## 2021-12-08 DIAGNOSIS — G70.9 DILATED CARDIOMYOPATHY SECONDARY TO NEUROMUSCULAR DISORDERS (HCC): ICD-10-CM

## 2021-12-08 DIAGNOSIS — J99: ICD-10-CM

## 2021-12-08 DIAGNOSIS — J96.10 CHRONIC RESPIRATORY FAILURE, UNSPECIFIED WHETHER WITH HYPOXIA OR HYPERCAPNIA (HCC): ICD-10-CM

## 2021-12-08 NOTE — TELEPHONE ENCOUNTER
Referral request [facility]    Integrated home care services  Fax number: [phone]    Patient has ALS needs CPAP supplies for this reason    [phone]    [facility]

## 2021-12-09 ENCOUNTER — TELEPHONE (OUTPATIENT)
Dept: FAMILY MEDICINE CLINIC | Facility: CLINIC | Age: 76
End: 2021-12-09

## 2021-12-13 ENCOUNTER — TELEPHONE (OUTPATIENT)
Dept: FAMILY MEDICINE CLINIC | Facility: CLINIC | Age: 76
End: 2021-12-13

## 2021-12-13 DIAGNOSIS — N12 PYELONEPHRITIS: ICD-10-CM

## 2021-12-13 NOTE — TELEPHONE ENCOUNTER
Wife calling to request refill on Tamsulosin. Pt is running low.  Please send refill to [facility]

## 2021-12-13 NOTE — TELEPHONE ENCOUNTER
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

## 2021-12-17 RX ORDER — RILUZOLE 50 MG/1
50 TABLET, FILM COATED ORAL EVERY 12 HOURS
COMMUNITY
Start: 2021-11-17

## 2021-12-17 RX ORDER — TAMSULOSIN HYDROCHLORIDE 0.4 MG/1
0.4 CAPSULE ORAL EVERY MORNING
Qty: 90 CAPSULE | Refills: 1 | Status: SHIPPED | OUTPATIENT
Start: 2021-12-17

## 2022-01-11 ENCOUNTER — TELEPHONE (OUTPATIENT)
Dept: FAMILY MEDICINE CLINIC | Facility: CLINIC | Age: 77
End: 2022-01-11

## 2022-01-17 ENCOUNTER — TELEPHONE (OUTPATIENT)
Dept: FAMILY MEDICINE CLINIC | Facility: CLINIC | Age: 77
End: 2022-01-17

## 2022-01-17 ENCOUNTER — TELEMEDICINE (OUTPATIENT)
Dept: FAMILY MEDICINE CLINIC | Facility: CLINIC | Age: 77
End: 2022-01-17
Payer: COMMERCIAL

## 2022-01-17 DIAGNOSIS — L29.9 PRURITIC DERMATITIS: ICD-10-CM

## 2022-01-17 DIAGNOSIS — R13.10 DYSPHAGIA, UNSPECIFIED TYPE: ICD-10-CM

## 2022-01-17 DIAGNOSIS — I10 ESSENTIAL HYPERTENSION: ICD-10-CM

## 2022-01-17 DIAGNOSIS — E11.29 TYPE 2 DIABETES MELLITUS WITH MICROALBUMINURIA, WITH LONG-TERM CURRENT USE OF INSULIN (HCC): ICD-10-CM

## 2022-01-17 DIAGNOSIS — G12.21 ALS (AMYOTROPHIC LATERAL SCLEROSIS) (HCC): Primary | ICD-10-CM

## 2022-01-17 DIAGNOSIS — Z79.4 TYPE 2 DIABETES MELLITUS WITH MICROALBUMINURIA, WITH LONG-TERM CURRENT USE OF INSULIN (HCC): ICD-10-CM

## 2022-01-17 DIAGNOSIS — R80.9 TYPE 2 DIABETES MELLITUS WITH MICROALBUMINURIA, WITH LONG-TERM CURRENT USE OF INSULIN (HCC): ICD-10-CM

## 2022-01-17 DIAGNOSIS — H93.8X3 SENSATION OF FULLNESS IN BOTH EARS: ICD-10-CM

## 2022-01-17 PROCEDURE — 99214 OFFICE O/P EST MOD 30 MIN: CPT | Performed by: FAMILY MEDICINE

## 2022-01-17 RX ORDER — LOSARTAN POTASSIUM 100 MG/1
100 TABLET ORAL DAILY
Qty: 90 TABLET | Refills: 1 | Status: SHIPPED | OUTPATIENT
Start: 2022-01-17

## 2022-01-17 RX ORDER — NYSTATIN 100000 [USP'U]/G
1 POWDER TOPICAL 4 TIMES DAILY
Qty: 60 G | Refills: 1 | Status: SHIPPED | OUTPATIENT
Start: 2022-01-17

## 2022-01-17 RX ORDER — DULAGLUTIDE 1.5 MG/.5ML
1.5 INJECTION, SOLUTION SUBCUTANEOUS WEEKLY
Qty: 6 ML | Refills: 3 | Status: SHIPPED | OUTPATIENT
Start: 2022-01-17 | End: 2022-02-16

## 2022-01-17 RX ORDER — INSULIN ASPART 100 [IU]/ML
20 INJECTION, SOLUTION INTRAVENOUS; SUBCUTANEOUS 2 TIMES DAILY
Qty: 36 ML | Refills: 1 | Status: SHIPPED | OUTPATIENT
Start: 2022-01-17

## 2022-01-17 NOTE — PROGRESS NOTES
Patient presents with:  Neurologic Problem: ALS  Other: need supplies due to decreased physical condition     Domenic Sierra is a 77year old male who presents via video encounter. HPI:   New diagnosis of ALS.  Seeing the pulmonology team.  Weakness blades, neck, and back of head. Interested in getting a powder to help the itching. Pharmacy - no longer seeing [facility]. [facility]. Needs Rx for his injections. Novolog, glargine and trulicity. Also needs his capsule.       REVIEW O upper arm neuropathies which are worsening with the neurologic condition. Arms not effective - cannot scratch his back.   Needs help from family for most things  -For safety at home, he will need an electric chair to help him push up to stand (sit to stand

## 2022-01-17 NOTE — TELEPHONE ENCOUNTER
Gilbert Espinoza from [address] us that pt will be discharged form Home health today with goals met

## 2022-01-19 ENCOUNTER — TELEPHONE (OUTPATIENT)
Dept: FAMILY MEDICINE CLINIC | Facility: CLINIC | Age: 77
End: 2022-01-19

## 2022-01-31 ENCOUNTER — TELEPHONE (OUTPATIENT)
Dept: FAMILY MEDICINE CLINIC | Facility: CLINIC | Age: 77
End: 2022-01-31

## 2022-01-31 NOTE — TELEPHONE ENCOUNTER
Patient notified must stay in network for pulmonologist.  Referral to Dr. Elihue Curling, M.D. was denied.

## 2022-02-11 ENCOUNTER — TELEPHONE (OUTPATIENT)
Dept: FAMILY MEDICINE CLINIC | Facility: CLINIC | Age: 77
End: 2022-02-11

## 2022-02-11 NOTE — TELEPHONE ENCOUNTER
Denise called for referral status  Per Sunnie Landau was completed and faxed on 2/10/22 Denise was informed

## 2022-02-16 ENCOUNTER — TELEPHONE (OUTPATIENT)
Dept: FAMILY MEDICINE CLINIC | Facility: CLINIC | Age: 77
End: 2022-02-16

## 2022-02-16 NOTE — TELEPHONE ENCOUNTER
Patient's wife called requesting name of pulmonologist in network. Patient given contact information for Dr. Srinath Li M.D. Patient's wife also asked about self rising chair due to patient's MS. This is not a covered durable medical equipment item but given name of [facility] and also [employer] in [place].

## 2022-02-19 ENCOUNTER — TELEPHONE (OUTPATIENT)
Dept: FAMILY MEDICINE CLINIC | Facility: CLINIC | Age: 77
End: 2022-02-19

## 2022-02-19 RX ORDER — CALCIPOTRIENE 50 UG/G
1 CREAM TOPICAL DAILY
Qty: 120 G | Refills: 1 | Status: SHIPPED | OUTPATIENT
Start: 2022-02-19

## 2022-02-19 NOTE — TELEPHONE ENCOUNTER
Has had itching for a while but getting worse on chest under arms inguinal area he has not used the nystatin but will start to use this in his groin and axilla. He is also using Waldryl for the itching it helps and does not make him sleepy. He is asking for a refill of calcipotriene 0.005 % External Cream. He said this worked well for this itching when he had it 3 years ago.

## 2022-02-22 ENCOUNTER — TELEPHONE (OUTPATIENT)
Dept: FAMILY MEDICINE CLINIC | Facility: CLINIC | Age: 77
End: 2022-02-22

## 2022-02-22 NOTE — TELEPHONE ENCOUNTER
I do not do the swallow studies, and we discussed on the video visit we got him in touch with the ENT Dr. Saravanan Bull to talk about swallowing and the ear issues he was having.

## 2022-02-22 NOTE — TELEPHONE ENCOUNTER
Patient called today, he asked about a swallow study you ordered. Did you order this? I do not see a specific order for this.

## 2022-02-22 NOTE — TELEPHONE ENCOUNTER
Patient notified that Dr. Layton Andino does not order the swallow study. He should see Dr. Chucho M.D. first and Dr. Chucho M.D. will order what is needed for the patient. Patient voiced understanding.

## 2022-03-17 PROBLEM — L29.9 ITCHING OF EAR: Status: ACTIVE | Noted: 2022-03-17

## 2022-03-28 ENCOUNTER — TELEPHONE (OUTPATIENT)
Dept: FAMILY MEDICINE CLINIC | Facility: CLINIC | Age: 77
End: 2022-03-28

## 2022-03-28 NOTE — TELEPHONE ENCOUNTER
Patient's wife came into office and was inquiring about toilet seat and wheelchair, would like to know what the status is, when can they expect delivery of equipment.  Please call wife

## 2022-04-01 ENCOUNTER — TELEPHONE (OUTPATIENT)
Dept: FAMILY MEDICINE CLINIC | Facility: CLINIC | Age: 77
End: 2022-04-01

## 2022-04-01 NOTE — TELEPHONE ENCOUNTER
Patient's wife notified I refaxed order to [employer]. I called them but they state did not receive order. I refaxed order and will check on it again next week.

## 2022-04-01 NOTE — TELEPHONE ENCOUNTER
Referral request Dr. Chiki Leong MD    Diagnosis: G12.21  Neurology    Date of visit 4/14/22    Office notes from Dr. Aydin Deras M.D. 1/13/22    Summary:    Followup for ALS. He feels weaker in the LEs since the last visit, walks 25 feet to bathroom, no falls, motor wheelchair in order. He occasionally chokes, currently does not want a feeding tube. ALS on NIV, DNR/DNI.  Does not want Peg tube at this time

## 2022-04-13 ENCOUNTER — TELEPHONE (OUTPATIENT)
Dept: FAMILY MEDICINE CLINIC | Facility: CLINIC | Age: 77
End: 2022-04-13

## 2022-04-13 NOTE — TELEPHONE ENCOUNTER
TC to patient's wife to let her know I had an approval for patient's raised toilet seat from [employer]. I sent order to [employer] but they are no longer contracted with [facility] their insurance. I am sending in a new request by using [facility] instead. Advised to call if any questions.

## 2022-04-14 ENCOUNTER — TELEPHONE (OUTPATIENT)
Dept: FAMILY MEDICINE CLINIC | Facility: CLINIC | Age: 77
End: 2022-04-14

## 2022-04-14 NOTE — TELEPHONE ENCOUNTER
Pt's wife dropped off handicapped placard form to be filled out. Pt would like a permanent placard due to dx ALS. Pt's wife will  once completed. Forms have been placed in triage for completion.

## 2022-04-19 RX ORDER — CALCIPOTRIENE 50 UG/G
1 CREAM TOPICAL DAILY
Qty: 120 G | Refills: 1 | Status: SHIPPED | OUTPATIENT
Start: 2022-04-19

## 2022-04-19 NOTE — TELEPHONE ENCOUNTER
LOV telemed visit with Natalie Claire.  Requesting Calcipotrien Cream to [facility]  Medication pended  Routed to Patterson Barges

## 2022-04-21 ENCOUNTER — TELEPHONE (OUTPATIENT)
Dept: FAMILY MEDICINE CLINIC | Facility: CLINIC | Age: 77
End: 2022-04-21

## 2022-04-21 NOTE — TELEPHONE ENCOUNTER
TC to patient's wife that we received an order from [employer] for a lift however their insurance does not work with [employer]. I will have to find another DME company that takes their insurance.

## 2022-05-03 ENCOUNTER — TELEPHONE (OUTPATIENT)
Dept: FAMILY MEDICINE CLINIC | Facility: CLINIC | Age: 77
End: 2022-05-03

## 2022-05-11 ENCOUNTER — TELEPHONE (OUTPATIENT)
Dept: FAMILY MEDICINE CLINIC | Facility: CLINIC | Age: 77
End: 2022-05-11

## 2022-05-11 DIAGNOSIS — M47.22 CERVICAL SPONDYLOSIS WITH RADICULOPATHY: ICD-10-CM

## 2022-05-11 DIAGNOSIS — M62.81 MUSCLE WEAKNESS (GENERALIZED): ICD-10-CM

## 2022-05-11 DIAGNOSIS — I48.0 PAROXYSMAL ATRIAL FIBRILLATION (HCC): ICD-10-CM

## 2022-05-11 DIAGNOSIS — G12.21 AMYOTROPHIC LATERAL SCLEROSIS (HCC): ICD-10-CM

## 2022-05-11 DIAGNOSIS — R26.2 CANNOT WALK: ICD-10-CM

## 2022-05-11 DIAGNOSIS — G62.89 DISEASE RELATED PERIPHERAL NEUROPATHY: ICD-10-CM

## 2022-05-11 DIAGNOSIS — E11.40 PAINFUL DIABETIC NEUROPATHY (HCC): Primary | ICD-10-CM

## 2022-05-12 NOTE — TELEPHONE ENCOUNTER
Referral request KATHLEEN    Mahi Briceño  Fax number: [phone]  Diagnosis: E11.40, G62.89, M47.22, M62.81, G12.21, I48.0, R26.2    Light weight wheelchair, transport chair. Patient's weight: 158 lbs, Height: 5/2\"    Office notes from Dr. César Domínguez M.D. 1/17/22  Wheelchair - unable to use motorized wheelchair as he is losing control of his hands. [employer] association sent a wheelchair but it is too heavy for his wife to lift. Hoping to get a lighter one his wife can lift.

## 2022-06-01 ENCOUNTER — TELEPHONE (OUTPATIENT)
Dept: FAMILY MEDICINE CLINIC | Facility: CLINIC | Age: 77
End: 2022-06-01

## 2022-06-01 DIAGNOSIS — E11.8 DIABETIC COMPLICATION (HCC): Primary | ICD-10-CM

## 2022-06-01 DIAGNOSIS — M62.81 MUSCLE WEAKNESS (GENERALIZED): ICD-10-CM

## 2022-06-01 DIAGNOSIS — R29.898 DEFICIENCIES OF LIMBS: ICD-10-CM

## 2022-06-01 NOTE — TELEPHONE ENCOUNTER
Referral request KATHLEEN Quintero  Fax number: [phone]  Diagnosis: E11.40, G62.89, M47.22, M62.81, G12.21, I48.0, R26.2    Light weight wheelchair, transport chair.     Patient's weight: 158 lbs, Height: 5/2\"    Office notes from Dr. Adam Rodriguez M.D. 1/17/22  Wheelchair - unable to use motorized wheelchair as he is losing control of his hands. ALS association sent a wheelchair but it is too heavy for his wife to lift. Hoping to get a lighter one his wife can lift.

## 2022-06-14 ENCOUNTER — TELEPHONE (OUTPATIENT)
Dept: FAMILY MEDICINE CLINIC | Facility: CLINIC | Age: 77
End: 2022-06-14

## 2022-06-14 DIAGNOSIS — R26.2 CANNOT WALK: ICD-10-CM

## 2022-06-14 DIAGNOSIS — I48.0 PAROXYSMAL ATRIAL FIBRILLATION (HCC): ICD-10-CM

## 2022-06-14 DIAGNOSIS — E11.8 DIABETIC COMPLICATION (HCC): Primary | ICD-10-CM

## 2022-06-14 DIAGNOSIS — R29.898 DEFICIENCIES OF LIMBS: ICD-10-CM

## 2022-06-14 DIAGNOSIS — M62.81 MUSCLE WEAKNESS (GENERALIZED): ICD-10-CM

## 2022-06-14 NOTE — TELEPHONE ENCOUNTER
Referral request KATHLEEN    Ayla Mancini    Fax number: [phone]    Light weight wheelchair, transport chair     Diagnosis: E11.8, M62.81, R29.898, I48.0, R26.2      Patient's weight 158 lbs, Height 5'2\"  Office notes from Dr. Connie Luis M.D. 1/17/22  Wheelchair unable to use motorized wheelchair as he is losing control of his hands. ALS association sent a wheelchair but it is too heavy for his wife to lift. Hoping to get a lighter one his wife can lift.

## 2022-06-17 ENCOUNTER — TELEPHONE (OUTPATIENT)
Dept: FAMILY MEDICINE CLINIC | Facility: CLINIC | Age: 77
End: 2022-06-17

## 2022-06-17 NOTE — TELEPHONE ENCOUNTER
Patient's wife requesting ramp for wheelchair, I will inquire with insurance company to see if this is a covered benefit.

## 2022-06-22 DIAGNOSIS — I10 ESSENTIAL HYPERTENSION: ICD-10-CM

## 2022-06-22 RX ORDER — LOSARTAN POTASSIUM 100 MG/1
TABLET ORAL
Qty: 90 TABLET | Refills: 0 | Status: SHIPPED | OUTPATIENT
Start: 2022-06-22

## 2022-06-25 ENCOUNTER — TELEPHONE (OUTPATIENT)
Dept: FAMILY MEDICINE CLINIC | Facility: CLINIC | Age: 77
End: 2022-06-25

## 2022-06-25 DIAGNOSIS — E11.40 TYPE 2 DIABETES MELLITUS WITH DIABETIC NEUROPATHY, UNSPECIFIED WHETHER LONG TERM INSULIN USE (HCC): Primary | ICD-10-CM

## 2022-06-25 DIAGNOSIS — E78.5 HYPERLIPIDEMIA LDL GOAL <100: ICD-10-CM

## 2022-06-25 NOTE — TELEPHONE ENCOUNTER
Please enter lab orders for the patient's upcoming physical appointment. Physical scheduled: Your appointments     Date & Time Appointment Department [facility])    Jul 26, 2022  3:00 PM CDT Physical - Established with Lc Mccain MD [facility] [address], [address], [place]  (Levine, Susan. \Hospital Has a New Name and Outlook.\"")            [address] [facility] [phone]         Preferred lab: [facility])     The patient has been notified to complete fasting labs prior to their physical appointment.

## 2022-07-27 ENCOUNTER — TELEPHONE (OUTPATIENT)
Dept: FAMILY MEDICINE CLINIC | Facility: CLINIC | Age: 77
End: 2022-07-27

## 2022-07-27 RX ORDER — DULAGLUTIDE 1.5 MG/.5ML
INJECTION, SOLUTION SUBCUTANEOUS
COMMUNITY
Start: 2022-06-30

## 2022-07-27 NOTE — TELEPHONE ENCOUNTER
Daughter called to schedule an appt with Dr Cony Ribeiro for itchy all over. He has been scheduled on 9/7/22 but wants sooner appt. Should we call pt on symptom?  Is also in wait list

## 2022-07-27 NOTE — TELEPHONE ENCOUNTER
Rash started 2 weeks ago constant tried cortisone 10. with some relief, he does not have a visible rash just the itching, I told them to try Eucerin or Aquaphor and zyrtec/allegra/claritan for the itching and if not better by Friday they will call back

## 2022-07-27 NOTE — TELEPHONE ENCOUNTER
TC to [facility] to make sure patient received his light weight wheelchair. Per billing from [facility] the patient said he did not want the light weight wheelchair what he really wants is a transport chair. This is not an item they provide as insurance only gives them 10.00 per month for this. The patient was told they would have to purchase this on their own by [facility]. Patient didn't want the lightweight wheelchair said it was still too heavy.

## 2022-08-16 DIAGNOSIS — R80.9 TYPE 2 DIABETES MELLITUS WITH MICROALBUMINURIA, WITH LONG-TERM CURRENT USE OF INSULIN (HCC): ICD-10-CM

## 2022-08-16 DIAGNOSIS — N12 PYELONEPHRITIS: ICD-10-CM

## 2022-08-16 DIAGNOSIS — E11.29 TYPE 2 DIABETES MELLITUS WITH MICROALBUMINURIA, WITH LONG-TERM CURRENT USE OF INSULIN (HCC): ICD-10-CM

## 2022-08-16 DIAGNOSIS — Z79.4 TYPE 2 DIABETES MELLITUS WITH MICROALBUMINURIA, WITH LONG-TERM CURRENT USE OF INSULIN (HCC): ICD-10-CM

## 2022-08-18 ENCOUNTER — TELEPHONE (OUTPATIENT)
Dept: FAMILY MEDICINE CLINIC | Facility: CLINIC | Age: 77
End: 2022-08-18

## 2022-08-18 DIAGNOSIS — G12.21 ALS (AMYOTROPHIC LATERAL SCLEROSIS) (HCC): Primary | ICD-10-CM

## 2022-08-19 RX ORDER — TAMSULOSIN HYDROCHLORIDE 0.4 MG/1
CAPSULE ORAL
Qty: 90 CAPSULE | Refills: 3 | Status: SHIPPED | OUTPATIENT
Start: 2022-08-19

## 2022-08-23 ENCOUNTER — TELEPHONE (OUTPATIENT)
Dept: FAMILY MEDICINE CLINIC | Facility: CLINIC | Age: 77
End: 2022-08-23

## 2022-08-23 DIAGNOSIS — G12.21 AMYOTROPHIC LATERAL SCLEROSIS (HCC): Primary | ICD-10-CM

## 2022-08-27 ENCOUNTER — HOSPITAL ENCOUNTER (OUTPATIENT)
Age: 77
Discharge: HOME | End: 2022-08-27
Payer: COMMERCIAL

## 2022-08-27 ENCOUNTER — APPOINTMENT (OUTPATIENT)
Dept: GENERAL RADIOLOGY | Age: 77
End: 2022-08-27
Attending: NURSE PRACTITIONER
Payer: COMMERCIAL

## 2022-08-27 VITALS
HEIGHT: 62 IN | BODY MASS INDEX: 23.92 KG/M2 | TEMPERATURE: 98 F | RESPIRATION RATE: 16 BRPM | OXYGEN SATURATION: 95 % | HEART RATE: 90 BPM | WEIGHT: 130 LBS | SYSTOLIC BLOOD PRESSURE: 99 MMHG | DIASTOLIC BLOOD PRESSURE: 55 MMHG

## 2022-08-27 DIAGNOSIS — J18.9 COMMUNITY ACQUIRED PNEUMONIA OF LEFT LOWER LOBE OF LUNG: Primary | ICD-10-CM

## 2022-08-27 PROCEDURE — 71046 X-RAY EXAM CHEST 2 VIEWS: CPT | Performed by: NURSE PRACTITIONER

## 2022-08-27 PROCEDURE — 99213 OFFICE O/P EST LOW 20 MIN: CPT

## 2022-08-27 RX ORDER — AMOXICILLIN AND CLAVULANATE POTASSIUM 600; 42.9 MG/5ML; MG/5ML
875 POWDER, FOR SUSPENSION ORAL 2 TIMES DAILY
Qty: 150 ML | Refills: 0 | Status: ON HOLD | OUTPATIENT
Start: 2022-08-27 | End: 2022-08-31

## 2022-08-27 NOTE — ED INITIAL ASSESSMENT (HPI)
Per family, patient has been increasingly fatigued, and decreased appetite x 2 days. Pt unable to cough. Family denies fever.

## 2022-08-28 ENCOUNTER — HOSPITAL ENCOUNTER (INPATIENT)
Facility: HOSPITAL | Age: 77
LOS: 3 days | Discharge: HOME HEALTH | DRG: 177 | End: 2022-08-31
Attending: EMERGENCY MEDICINE | Admitting: INTERNAL MEDICINE
Payer: COMMERCIAL

## 2022-08-28 ENCOUNTER — HOSPITAL ENCOUNTER (INPATIENT)
Facility: HOSPITAL | Age: 77
LOS: 3 days | Discharge: HOME | End: 2022-08-31
Attending: EMERGENCY MEDICINE | Admitting: INTERNAL MEDICINE
Payer: COMMERCIAL

## 2022-08-28 ENCOUNTER — APPOINTMENT (OUTPATIENT)
Dept: CT IMAGING | Facility: HOSPITAL | Age: 77
End: 2022-08-28
Attending: EMERGENCY MEDICINE
Payer: COMMERCIAL

## 2022-08-28 ENCOUNTER — APPOINTMENT (OUTPATIENT)
Dept: CT IMAGING | Facility: HOSPITAL | Age: 77
DRG: 177 | End: 2022-08-28
Attending: EMERGENCY MEDICINE
Payer: COMMERCIAL

## 2022-08-28 DIAGNOSIS — G12.21 ALS (AMYOTROPHIC LATERAL SCLEROSIS) (HCC): ICD-10-CM

## 2022-08-28 DIAGNOSIS — J18.9 COMMUNITY ACQUIRED PNEUMONIA OF LEFT LOWER LOBE OF LUNG: Primary | ICD-10-CM

## 2022-08-28 DIAGNOSIS — Z79.4 TYPE 2 DIABETES MELLITUS WITH MICROALBUMINURIA, WITH LONG-TERM CURRENT USE OF INSULIN (HCC): ICD-10-CM

## 2022-08-28 DIAGNOSIS — R80.9 TYPE 2 DIABETES MELLITUS WITH MICROALBUMINURIA, WITH LONG-TERM CURRENT USE OF INSULIN (HCC): ICD-10-CM

## 2022-08-28 DIAGNOSIS — E86.0 DEHYDRATION: ICD-10-CM

## 2022-08-28 DIAGNOSIS — E11.29 TYPE 2 DIABETES MELLITUS WITH MICROALBUMINURIA, WITH LONG-TERM CURRENT USE OF INSULIN (HCC): ICD-10-CM

## 2022-08-28 PROBLEM — R73.9 HYPERGLYCEMIA: Status: ACTIVE | Noted: 2022-08-28

## 2022-08-28 PROBLEM — R79.89 AZOTEMIA: Status: ACTIVE | Noted: 2022-08-28

## 2022-08-28 LAB
ALBUMIN SERPL-MCNC: 3.5 G/DL (ref 3.4–5)
ALBUMIN/GLOB SERPL: 0.9 {RATIO} (ref 1–2)
ALP LIVER SERPL-CCNC: 69 U/L
ALT SERPL-CCNC: 17 U/L
ANION GAP SERPL CALC-SCNC: 8 MMOL/L (ref 0–18)
AST SERPL-CCNC: 16 U/L (ref 15–37)
ATRIAL RATE: 98 BPM
BASOPHILS # BLD AUTO: 0.03 X10(3) UL (ref 0–0.2)
BASOPHILS NFR BLD AUTO: 0.2 %
BILIRUB SERPL-MCNC: 0.6 MG/DL (ref 0.1–2)
BILIRUB UR QL CFM: NEGATIVE
BUN BLD-MCNC: 33 MG/DL (ref 7–18)
CALCIUM BLD-MCNC: 9.1 MG/DL (ref 8.5–10.1)
CHLORIDE SERPL-SCNC: 106 MMOL/L (ref 98–112)
CLARITY UR REFRACT.AUTO: CLEAR
CO2 SERPL-SCNC: 24 MMOL/L (ref 21–32)
COLOR UR AUTO: YELLOW
CREAT BLD-MCNC: 0.93 MG/DL
EOSINOPHIL # BLD AUTO: 0.02 X10(3) UL (ref 0–0.7)
EOSINOPHIL NFR BLD AUTO: 0.1 %
ERYTHROCYTE [DISTWIDTH] IN BLOOD BY AUTOMATED COUNT: 13.6 %
EST. AVERAGE GLUCOSE BLD GHB EST-MCNC: 94 MG/DL (ref 68–126)
GFR SERPLBLD BASED ON 1.73 SQ M-ARVRAT: 85 ML/MIN/1.73M2 (ref 60–?)
GLOBULIN PLAS-MCNC: 4 G/DL (ref 2.8–4.4)
GLUCOSE BLD-MCNC: 138 MG/DL (ref 70–99)
GLUCOSE BLD-MCNC: 173 MG/DL (ref 70–99)
GLUCOSE UR STRIP.AUTO-MCNC: NEGATIVE MG/DL
GRAN CASTS #/AREA URNS LPF: PRESENT /LPF
GRAN CASTS #/AREA URNS LPF: PRESENT /LPF
HBA1C MFR BLD: 4.9 % (ref ?–5.7)
HCT VFR BLD AUTO: 41.4 %
HGB BLD-MCNC: 13.4 G/DL
IMM GRANULOCYTES # BLD AUTO: 0.05 X10(3) UL (ref 0–1)
IMM GRANULOCYTES NFR BLD: 0.4 %
KETONES UR STRIP.AUTO-MCNC: 15 MG/DL
LACTATE SERPL-SCNC: 0.8 MMOL/L (ref 0.4–2)
LEUKOCYTE ESTERASE UR QL STRIP.AUTO: NEGATIVE
LYMPHOCYTES # BLD AUTO: 1.45 X10(3) UL (ref 1–4)
LYMPHOCYTES NFR BLD AUTO: 10.9 %
MAGNESIUM SERPL-MCNC: 1.8 MG/DL (ref 1.6–2.6)
MCH RBC QN AUTO: 30.8 PG (ref 26–34)
MCHC RBC AUTO-ENTMCNC: 32.4 G/DL (ref 31–37)
MCV RBC AUTO: 95.2 FL
MONOCYTES # BLD AUTO: 0.53 X10(3) UL (ref 0.1–1)
MONOCYTES NFR BLD AUTO: 4 %
NEUTROPHILS # BLD AUTO: 11.27 X10 (3) UL (ref 1.5–7.7)
NEUTROPHILS # BLD AUTO: 11.27 X10(3) UL (ref 1.5–7.7)
NEUTROPHILS NFR BLD AUTO: 84.4 %
NITRITE UR QL STRIP.AUTO: NEGATIVE
OSMOLALITY SERPL CALC.SUM OF ELEC: 297 MOSM/KG (ref 275–295)
P AXIS: 36 DEGREES
P-R INTERVAL: 138 MS
PH UR STRIP.AUTO: 6 [PH] (ref 5–8)
PLATELET # BLD AUTO: 346 10(3)UL (ref 150–450)
POTASSIUM SERPL-SCNC: 4.5 MMOL/L (ref 3.5–5.1)
PROT SERPL-MCNC: 7.5 G/DL (ref 6.4–8.2)
Q-T INTERVAL: 368 MS
QRS DURATION: 136 MS
QTC CALCULATION (BEZET): 469 MS
R AXIS: -88 DEGREES
RBC # BLD AUTO: 4.35 X10(6)UL
RBC UR QL AUTO: NEGATIVE
SARS-COV-2 RNA RESP QL NAA+PROBE: NOT DETECTED
SODIUM SERPL-SCNC: 138 MMOL/L (ref 136–145)
SP GR UR STRIP.AUTO: 1.02 (ref 1–1.03)
T AXIS: 45 DEGREES
TROPONIN I HIGH SENSITIVITY: 30 NG/L
UROBILINOGEN UR STRIP.AUTO-MCNC: 0.2 MG/DL
VENTRICULAR RATE: 98 BPM
WBC # BLD AUTO: 13.4 X10(3) UL (ref 4–11)

## 2022-08-28 PROCEDURE — 71250 CT THORAX DX C-: CPT | Performed by: EMERGENCY MEDICINE

## 2022-08-28 PROCEDURE — 99223 1ST HOSP IP/OBS HIGH 75: CPT | Performed by: INTERNAL MEDICINE

## 2022-08-28 RX ORDER — DEXTROSE MONOHYDRATE 25 G/50ML
50 INJECTION, SOLUTION INTRAVENOUS
Status: DISCONTINUED | OUTPATIENT
Start: 2022-08-28 | End: 2022-08-31

## 2022-08-28 RX ORDER — MAGNESIUM OXIDE 400 MG/1
400 TABLET ORAL ONCE
Status: COMPLETED | OUTPATIENT
Start: 2022-08-28 | End: 2022-08-28

## 2022-08-28 RX ORDER — ACETAMINOPHEN 500 MG
500 TABLET ORAL EVERY 4 HOURS PRN
Status: DISCONTINUED | OUTPATIENT
Start: 2022-08-28 | End: 2022-08-31

## 2022-08-28 RX ORDER — RILUZOLE 50 MG/1
50 TABLET, FILM COATED ORAL EVERY 12 HOURS
Status: DISCONTINUED | OUTPATIENT
Start: 2022-08-28 | End: 2022-08-31

## 2022-08-28 RX ORDER — NICOTINE POLACRILEX 4 MG
15 LOZENGE BUCCAL
Status: DISCONTINUED | OUTPATIENT
Start: 2022-08-28 | End: 2022-08-31

## 2022-08-28 RX ORDER — SODIUM CHLORIDE 9 MG/ML
INJECTION, SOLUTION INTRAVENOUS CONTINUOUS
Status: ACTIVE | OUTPATIENT
Start: 2022-08-28 | End: 2022-08-28

## 2022-08-28 RX ORDER — TAMSULOSIN HYDROCHLORIDE 0.4 MG/1
0.4 CAPSULE ORAL EVERY MORNING
Status: DISCONTINUED | OUTPATIENT
Start: 2022-08-29 | End: 2022-08-31

## 2022-08-28 RX ORDER — LOSARTAN POTASSIUM 100 MG/1
100 TABLET ORAL DAILY
Status: DISCONTINUED | OUTPATIENT
Start: 2022-08-28 | End: 2022-08-31

## 2022-08-28 RX ORDER — NICOTINE POLACRILEX 4 MG
30 LOZENGE BUCCAL
Status: DISCONTINUED | OUTPATIENT
Start: 2022-08-28 | End: 2022-08-31

## 2022-08-28 RX ORDER — SODIUM CHLORIDE 9 MG/ML
INJECTION, SOLUTION INTRAVENOUS CONTINUOUS
Status: DISCONTINUED | OUTPATIENT
Start: 2022-08-28 | End: 2022-08-30

## 2022-08-28 RX ORDER — ENOXAPARIN SODIUM 100 MG/ML
40 INJECTION SUBCUTANEOUS DAILY
Status: DISCONTINUED | OUTPATIENT
Start: 2022-08-29 | End: 2022-08-31

## 2022-08-28 RX ORDER — ONDANSETRON 2 MG/ML
4 INJECTION INTRAMUSCULAR; INTRAVENOUS EVERY 6 HOURS PRN
Status: DISCONTINUED | OUTPATIENT
Start: 2022-08-28 | End: 2022-08-31

## 2022-08-28 NOTE — CM/SW NOTE
Spoke to daughter and wife about medicare benefits. Explained difference between medicare A&B. Family will call to potentially enroll in medicare part B since it covers outpatient services. I also explained hospice care and what they provide. I will also consult case management and social work to follow the patient on the floor.

## 2022-08-28 NOTE — PROGRESS NOTES
NURSING ADMISSION NOTE      Patient admitted via Cart  Oriented to room. Safety precautions initiated. Bed in low position. Call light in reach. Assumed care at 1630, pt's alert and oriented, incomprehensible. RA. Tele, SR. NaCl 100 mL/hr. Incontinent, primofit. Pt and family updated on poc. Admission navigator completed. Report given to jeff RN. Safety precautions in place.

## 2022-08-28 NOTE — ED INITIAL ASSESSMENT (HPI)
Patient arrives to the ED via EMS with c/o pneumonia, diagnosed yesterday. Pt has ALS. Can't move per norm. Does not talk per norm. Increase weakness today.

## 2022-08-29 ENCOUNTER — TELEPHONE (OUTPATIENT)
Dept: FAMILY MEDICINE CLINIC | Facility: CLINIC | Age: 77
End: 2022-08-29

## 2022-08-29 LAB
ADENOVIRUS PCR:: NOT DETECTED
ALBUMIN SERPL-MCNC: 2.9 G/DL (ref 3.4–5)
ALBUMIN/GLOB SERPL: 0.8 {RATIO} (ref 1–2)
ALP LIVER SERPL-CCNC: 53 U/L
ALT SERPL-CCNC: 12 U/L
ANION GAP SERPL CALC-SCNC: 8 MMOL/L (ref 0–18)
AST SERPL-CCNC: 8 U/L (ref 15–37)
B PARAPERT DNA SPEC QL NAA+PROBE: NOT DETECTED
B PERT DNA SPEC QL NAA+PROBE: NOT DETECTED
BASOPHILS # BLD AUTO: 0.04 X10(3) UL (ref 0–0.2)
BASOPHILS NFR BLD AUTO: 0.4 %
BILIRUB SERPL-MCNC: 0.7 MG/DL (ref 0.1–2)
BUN BLD-MCNC: 29 MG/DL (ref 7–18)
C PNEUM DNA SPEC QL NAA+PROBE: NOT DETECTED
CALCIUM BLD-MCNC: 8.5 MG/DL (ref 8.5–10.1)
CHLORIDE SERPL-SCNC: 112 MMOL/L (ref 98–112)
CO2 SERPL-SCNC: 25 MMOL/L (ref 21–32)
CORONAVIRUS 229E PCR:: NOT DETECTED
CORONAVIRUS HKU1 PCR:: NOT DETECTED
CORONAVIRUS NL63 PCR:: NOT DETECTED
CORONAVIRUS OC43 PCR:: NOT DETECTED
CREAT BLD-MCNC: 0.73 MG/DL
EOSINOPHIL # BLD AUTO: 0.09 X10(3) UL (ref 0–0.7)
EOSINOPHIL NFR BLD AUTO: 0.9 %
ERYTHROCYTE [DISTWIDTH] IN BLOOD BY AUTOMATED COUNT: 13.4 %
FLUAV RNA SPEC QL NAA+PROBE: NOT DETECTED
FLUBV RNA SPEC QL NAA+PROBE: NOT DETECTED
GFR SERPLBLD BASED ON 1.73 SQ M-ARVRAT: 94 ML/MIN/1.73M2 (ref 60–?)
GLOBULIN PLAS-MCNC: 3.5 G/DL (ref 2.8–4.4)
GLUCOSE BLD-MCNC: 149 MG/DL (ref 70–99)
GLUCOSE BLD-MCNC: 153 MG/DL (ref 70–99)
GLUCOSE BLD-MCNC: 183 MG/DL (ref 70–99)
GLUCOSE BLD-MCNC: 188 MG/DL (ref 70–99)
GLUCOSE BLD-MCNC: 202 MG/DL (ref 70–99)
HCT VFR BLD AUTO: 37.1 %
HGB BLD-MCNC: 11.4 G/DL
IMM GRANULOCYTES # BLD AUTO: 0.03 X10(3) UL (ref 0–1)
IMM GRANULOCYTES NFR BLD: 0.3 %
LYMPHOCYTES # BLD AUTO: 1.47 X10(3) UL (ref 1–4)
LYMPHOCYTES NFR BLD AUTO: 14.4 %
MAGNESIUM SERPL-MCNC: 1.8 MG/DL (ref 1.6–2.6)
MCH RBC QN AUTO: 30.6 PG (ref 26–34)
MCHC RBC AUTO-ENTMCNC: 30.7 G/DL (ref 31–37)
MCV RBC AUTO: 99.5 FL
METAPNEUMOVIRUS PCR:: NOT DETECTED
MONOCYTES # BLD AUTO: 0.76 X10(3) UL (ref 0.1–1)
MONOCYTES NFR BLD AUTO: 7.5 %
MYCOPLASMA PNEUMONIA PCR:: NOT DETECTED
NEUTROPHILS # BLD AUTO: 7.79 X10 (3) UL (ref 1.5–7.7)
NEUTROPHILS # BLD AUTO: 7.79 X10(3) UL (ref 1.5–7.7)
NEUTROPHILS NFR BLD AUTO: 76.5 %
OSMOLALITY SERPL CALC.SUM OF ELEC: 309 MOSM/KG (ref 275–295)
PARAINFLUENZA 1 PCR:: NOT DETECTED
PARAINFLUENZA 2 PCR:: NOT DETECTED
PARAINFLUENZA 3 PCR:: NOT DETECTED
PARAINFLUENZA 4 PCR:: NOT DETECTED
PHOSPHATE SERPL-MCNC: 2.9 MG/DL (ref 2.5–4.9)
PLATELET # BLD AUTO: 292 10(3)UL (ref 150–450)
POTASSIUM SERPL-SCNC: 3.9 MMOL/L (ref 3.5–5.1)
PROT SERPL-MCNC: 6.4 G/DL (ref 6.4–8.2)
RBC # BLD AUTO: 3.73 X10(6)UL
RHINOVIRUS/ENTERO PCR:: NOT DETECTED
RSV RNA SPEC QL NAA+PROBE: NOT DETECTED
SARS-COV-2 RNA NPH QL NAA+NON-PROBE: NOT DETECTED
SODIUM SERPL-SCNC: 145 MMOL/L (ref 136–145)
WBC # BLD AUTO: 10.2 X10(3) UL (ref 4–11)

## 2022-08-29 PROCEDURE — 99232 SBSQ HOSP IP/OBS MODERATE 35: CPT | Performed by: INTERNAL MEDICINE

## 2022-08-29 RX ORDER — MAGNESIUM SULFATE HEPTAHYDRATE 40 MG/ML
2 INJECTION, SOLUTION INTRAVENOUS ONCE
Status: COMPLETED | OUTPATIENT
Start: 2022-08-29 | End: 2022-08-29

## 2022-08-29 RX ORDER — ACETYLCYSTEINE 200 MG/ML
2 SOLUTION ORAL; RESPIRATORY (INHALATION) 2 TIMES DAILY
Status: DISCONTINUED | OUTPATIENT
Start: 2022-08-29 | End: 2022-08-29

## 2022-08-29 RX ORDER — ACETYLCYSTEINE 200 MG/ML
2 SOLUTION ORAL; RESPIRATORY (INHALATION) 2 TIMES DAILY
Status: DISCONTINUED | OUTPATIENT
Start: 2022-08-29 | End: 2022-08-31

## 2022-08-29 RX ORDER — GUAIFENESIN 600 MG
600 TABLET, EXTENDED RELEASE 12 HR ORAL 2 TIMES DAILY
Status: DISCONTINUED | OUTPATIENT
Start: 2022-08-29 | End: 2022-08-31

## 2022-08-29 RX ORDER — IPRATROPIUM BROMIDE AND ALBUTEROL SULFATE 2.5; .5 MG/3ML; MG/3ML
3 SOLUTION RESPIRATORY (INHALATION) EVERY 6 HOURS PRN
Status: DISCONTINUED | OUTPATIENT
Start: 2022-08-29 | End: 2022-08-31

## 2022-08-29 NOTE — PROGRESS NOTES
Problem:  Generalized Weakness    Data: Alert and oriented. Slow to speak. Patient feels better today. Weakness is improving. Room air. Lovenox. Incontinent d/t limited mobility. RLQ CGM. Accuchecks. Speech to see. Video swallow for tomorrow. PT to see. IVF. IV abx. Total lift. Family at bedside, updated on POC. Patients home med in bin. Action: Keep patient comfortable and continue to monitor every 2 hours. Education: POC for the day. Response: Verbalized understanding.

## 2022-08-29 NOTE — PLAN OF CARE
Patient alert and orientedx 3-4. Patient with limited speech but is understandable, patient nods/gestures appropriately and follows commands, incomprehensible at times. Patient resting on Room air, patient remains on tele and  monitoring. Patient with very limited mobility, only able to move hands and feet with limited rom. PT/OT eval pending. Patient passed bedside nurse swallow, patient was able to eat all of his dinner brought by family and took medications without any problems, pills were split. Appetite improve. ST eval pending. Magnesium replaced, recheck ordered for the am, see results and replace as needed. IVF. Patient in brief and primofit. Urine collected and sent, see results. Fall precautions in place.  Call light in reach  [facility] dinner ordered    Problem: Patient/Family Goals  Goal: Patient/Family Long Term Goal  Description: Patient's Long Term Goal:  discharge home    Interventions:  - IVF  -MD rounding  -PT/OT eval  -ST eval  - See additional Care Plan goals for specific interventions  Outcome: Progressing  Goal: Patient/Family Short Term Goal  Description: Patient's Short Term Goal:   8/28 noc: rest/sleep, eat    Interventions:   - Dark/quiet room  -Inpatient rounding.   - See additional Care Plan goals for specific interventions  Outcome: Progressing     Problem: GASTROINTESTINAL - ADULT  Goal: Maintains adequate nutritional intake (undernourished)  Description: INTERVENTIONS:  - Monitor percentage of each meal consumed  - Identify factors contributing to decreased intake, treat as appropriate  - Assist with meals as needed  - Monitor I&O, WT and lab values  - Obtain nutritional consult as needed  - Optimize oral hygiene and moisture  - Encourage food from home; allow for food preferences  - Enhance eating environment  Outcome: Progressing     Problem: Diabetes/Glucose Control  Goal: Glucose maintained within prescribed range  Description: INTERVENTIONS:  - Monitor Blood Glucose as ordered  - Assess for signs and symptoms of hyperglycemia and hypoglycemia  - Administer ordered medications to maintain glucose within target range  - Assess barriers to adequate nutritional intake and initiate nutrition consult as needed  - Instruct patient on self management of diabetes  Outcome: Progressing

## 2022-08-29 NOTE — TELEPHONE ENCOUNTER
Spouse is calling her  is in the hospital and she needs a referral for home health. He is serious with pneumonia.      She said the insurance P 243 said she has to have Dr. Reggie Rincon put in an order/referral

## 2022-08-29 NOTE — TELEPHONE ENCOUNTER
Called [facility] in detail that the discharge planner arranges all this prior to discharge then home health will call us to sign the orders.

## 2022-08-30 ENCOUNTER — APPOINTMENT (OUTPATIENT)
Dept: GENERAL RADIOLOGY | Facility: HOSPITAL | Age: 77
End: 2022-08-30
Attending: INTERNAL MEDICINE
Payer: COMMERCIAL

## 2022-08-30 ENCOUNTER — APPOINTMENT (OUTPATIENT)
Dept: GENERAL RADIOLOGY | Facility: HOSPITAL | Age: 77
DRG: 177 | End: 2022-08-30
Attending: INTERNAL MEDICINE
Payer: COMMERCIAL

## 2022-08-30 LAB
ANION GAP SERPL CALC-SCNC: 5 MMOL/L (ref 0–18)
BASOPHILS # BLD AUTO: 0.03 X10(3) UL (ref 0–0.2)
BASOPHILS NFR BLD AUTO: 0.3 %
BUN BLD-MCNC: 18 MG/DL (ref 7–18)
CALCIUM BLD-MCNC: 8.5 MG/DL (ref 8.5–10.1)
CHLORIDE SERPL-SCNC: 111 MMOL/L (ref 98–112)
CO2 SERPL-SCNC: 27 MMOL/L (ref 21–32)
CREAT BLD-MCNC: 0.61 MG/DL
EOSINOPHIL # BLD AUTO: 0.14 X10(3) UL (ref 0–0.7)
EOSINOPHIL NFR BLD AUTO: 1.5 %
ERYTHROCYTE [DISTWIDTH] IN BLOOD BY AUTOMATED COUNT: 13.6 %
GFR SERPLBLD BASED ON 1.73 SQ M-ARVRAT: 100 ML/MIN/1.73M2 (ref 60–?)
GLUCOSE BLD-MCNC: 177 MG/DL (ref 70–99)
GLUCOSE BLD-MCNC: 193 MG/DL (ref 70–99)
GLUCOSE BLD-MCNC: 221 MG/DL (ref 70–99)
GLUCOSE BLD-MCNC: 227 MG/DL (ref 70–99)
GLUCOSE BLD-MCNC: 239 MG/DL (ref 70–99)
HCT VFR BLD AUTO: 37 %
HGB BLD-MCNC: 11.5 G/DL
IMM GRANULOCYTES # BLD AUTO: 0.04 X10(3) UL (ref 0–1)
IMM GRANULOCYTES NFR BLD: 0.4 %
LYMPHOCYTES # BLD AUTO: 1.9 X10(3) UL (ref 1–4)
LYMPHOCYTES NFR BLD AUTO: 20.4 %
MAGNESIUM SERPL-MCNC: 2 MG/DL (ref 1.6–2.6)
MCH RBC QN AUTO: 30.4 PG (ref 26–34)
MCHC RBC AUTO-ENTMCNC: 31.1 G/DL (ref 31–37)
MCV RBC AUTO: 97.9 FL
MONOCYTES # BLD AUTO: 0.77 X10(3) UL (ref 0.1–1)
MONOCYTES NFR BLD AUTO: 8.3 %
NEUTROPHILS # BLD AUTO: 6.45 X10 (3) UL (ref 1.5–7.7)
NEUTROPHILS # BLD AUTO: 6.45 X10(3) UL (ref 1.5–7.7)
NEUTROPHILS NFR BLD AUTO: 69.1 %
OSMOLALITY SERPL CALC.SUM OF ELEC: 302 MOSM/KG (ref 275–295)
PLATELET # BLD AUTO: 266 10(3)UL (ref 150–450)
POTASSIUM SERPL-SCNC: 3.5 MMOL/L (ref 3.5–5.1)
RBC # BLD AUTO: 3.78 X10(6)UL
SODIUM SERPL-SCNC: 143 MMOL/L (ref 136–145)
WBC # BLD AUTO: 9.3 X10(3) UL (ref 4–11)

## 2022-08-30 PROCEDURE — 99232 SBSQ HOSP IP/OBS MODERATE 35: CPT | Performed by: INTERNAL MEDICINE

## 2022-08-30 PROCEDURE — 74230 X-RAY XM SWLNG FUNCJ C+: CPT | Performed by: INTERNAL MEDICINE

## 2022-08-30 RX ORDER — MULTIPLE VITAMINS W/ MINERALS TAB 9MG-400MCG
1 TAB ORAL DAILY
Status: DISCONTINUED | OUTPATIENT
Start: 2022-08-30 | End: 2022-08-31

## 2022-08-30 RX ORDER — DOCUSATE SODIUM 100 MG/1
100 CAPSULE, LIQUID FILLED ORAL 2 TIMES DAILY
Status: DISCONTINUED | OUTPATIENT
Start: 2022-08-30 | End: 2022-08-31

## 2022-08-30 RX ORDER — POTASSIUM CHLORIDE 20 MEQ/1
40 TABLET, EXTENDED RELEASE ORAL EVERY 4 HOURS
Status: COMPLETED | OUTPATIENT
Start: 2022-08-30 | End: 2022-08-30

## 2022-08-30 NOTE — PLAN OF CARE
Pt is admitted for PNA and weakness. Aspiration precaution. Pt is AOx4. Hx of ALS. Slurry  speech. Pt said he feels better  and stronger today. VSS, afebrile and denies any pain. SpO2 maintained on RA. . Denies any SOB, cough. Tele-NSR with BBB and PAC. Lovenox. Carb control diet/QID ACCU check. Soft diet and thin liquid. 1:1 feeder. Video swallow test completed. Denies any n/v/d. Pt did not have any bowel movement last few days. MD ordered Colace. Incontinent and primofit is on. Total lift. Bilateral hand are mild;y swollen. MD DC-ed  IV fluid. Hands are elevated. IV ABX. WCTM. Pt is updated with plan of care. Multidisciplinary Discharge Rounds held 8/30/2022. Treatment team members present today include , , Charge Nurse, Nurse, RT, PT and Pharmacy caring for [employer]. Other care providers present:    Mobility Goal: Q2 turn     Readmission Risk:     [] Low     [x] Medium     [] High    Active issue(s) requiring resolution prior to discharge: PNA , swallow difficulty     Anticipated discharge date: TBD    Current discharge plan: Home     F/U appointment scheduled within 7 days. .. [] [facility] ([facility])     [] Pulmonologist     [x] Primary Care Physician     [] Other Specialty    Watched the home discharge recovery videos related to diagnosis. .. [x] Pneumonia     [] COPD    [] Home Health set up. [x] Care partner identified and updated with the plan of care.             Problem: Patient/Family Goals  Goal: Patient/Family Long Term Goal  Description: Patient's Long Term Goal:  discharge home    Interventions:  - IVF  -MD rounding  -PT/OT kaur  -ST dietrich  - See additional Care Plan goals for specific interventions  Outcome: Progressing  Goal: Patient/Family Short Term Goal  Description: Patient's Short Term Goal:   8/28 noc: rest/sleep, eat  8/30: stay safe and comfortable     Interventions:   -Fall and aspiration precaution , video swallow done   - Dark/quiet room  -Inpatient rounding.   - See additional Care Plan goals for specific interventions  Outcome: Progressing     Problem: GASTROINTESTINAL - ADULT  Goal: Maintains adequate nutritional intake (undernourished)  Description: INTERVENTIONS:  - Monitor percentage of each meal consumed  - Identify factors contributing to decreased intake, treat as appropriate  - Assist with meals as needed  - Monitor I&O, WT and lab values  - Obtain nutritional consult as needed  - Optimize oral hygiene and moisture  - Encourage food from home; allow for food preferences  - Enhance eating environment  Outcome: Progressing     Problem: Diabetes/Glucose Control  Goal: Glucose maintained within prescribed range  Description: INTERVENTIONS:  - Monitor Blood Glucose as ordered  - Assess for signs and symptoms of hyperglycemia and hypoglycemia  - Administer ordered medications to maintain glucose within target range  - Assess barriers to adequate nutritional intake and initiate nutrition consult as needed  - Instruct patient on self management of diabetes  Outcome: Progressing

## 2022-08-30 NOTE — PLAN OF CARE
AO x3/4. Hx of ALS. Limited speech. Incomprehensible speech at times. Takes pills in half. Dentures. RA. Tele - NSR. Lovenox. Briefed. Primofit in place. Incontinent. No reports of pain. Bunny boots on. QID accchecks. Total lift. Reposition Q2. Little to no movement in extremities. IVF running per [facility] Zosyn. Carb controlled diet. Good appetite. Wife and patient updated on POC. No further needs at this moment.          Problem: Patient/Family Goals  Goal: Patient/Family Long Term Goal  Description: Patient's Long Term Goal:  discharge home    Interventions:  - IVF  -MD rounding  -PT/OT eval  -ST eval  - See additional Care Plan goals for specific interventions  Outcome: Progressing  Goal: Patient/Family Short Term Goal  Description: Patient's Short Term Goal:   8/28 noc: rest/sleep, eat    Interventions:   - Dark/quiet room  -Inpatient rounding.   - See additional Care Plan goals for specific interventions  Outcome: Progressing     Problem: GASTROINTESTINAL - ADULT  Goal: Maintains adequate nutritional intake (undernourished)  Description: INTERVENTIONS:  - Monitor percentage of each meal consumed  - Identify factors contributing to decreased intake, treat as appropriate  - Assist with meals as needed  - Monitor I&O, WT and lab values  - Obtain nutritional consult as needed  - Optimize oral hygiene and moisture  - Encourage food from home; allow for food preferences  - Enhance eating environment  Outcome: Progressing     Problem: Diabetes/Glucose Control  Goal: Glucose maintained within prescribed range  Description: INTERVENTIONS:  - Monitor Blood Glucose as ordered  - Assess for signs and symptoms of hyperglycemia and hypoglycemia  - Administer ordered medications to maintain glucose within target range  - Assess barriers to adequate nutritional intake and initiate nutrition consult as needed  - Instruct patient on self management of diabetes  Outcome: Progressing

## 2022-08-30 NOTE — CM/SW NOTE
CM spoke to patient and family for discharge planning follow up; [address] [place], [address], and [place] [address] information given. Patient's family inquiring about general seth lift information; CM left [facility] L[facility] instructions for use at home at bedside. CM contact information given to patient's daughter for any additional care planning needs. SW/CM to remain available for any further discharge planning needs.    Bill Mckeon, RN Case Manager [phone] [place]

## 2022-08-31 VITALS
DIASTOLIC BLOOD PRESSURE: 80 MMHG | HEART RATE: 88 BPM | SYSTOLIC BLOOD PRESSURE: 130 MMHG | RESPIRATION RATE: 26 BRPM | OXYGEN SATURATION: 97 % | BODY MASS INDEX: 25 KG/M2 | TEMPERATURE: 98 F | WEIGHT: 138.38 LBS

## 2022-08-31 LAB
CHOLEST SERPL-MCNC: 133 MG/DL (ref ?–200)
CREAT UR-SCNC: 23.1 MG/DL
GLUCOSE BLD-MCNC: 190 MG/DL (ref 70–99)
GLUCOSE BLD-MCNC: 219 MG/DL (ref 70–99)
GLUCOSE BLD-MCNC: 256 MG/DL (ref 70–99)
HDLC SERPL-MCNC: 41 MG/DL (ref 40–59)
LDLC SERPL CALC-MCNC: 69 MG/DL (ref ?–100)
MICROALBUMIN UR-MCNC: 14.5 MG/DL
MICROALBUMIN/CREAT 24H UR-RTO: 627.7 UG/MG (ref ?–30)
NONHDLC SERPL-MCNC: 92 MG/DL (ref ?–130)
TRIGL SERPL-MCNC: 126 MG/DL (ref 30–149)
VLDLC SERPL CALC-MCNC: 19 MG/DL (ref 0–30)

## 2022-08-31 PROCEDURE — 99239 HOSP IP/OBS DSCHRG MGMT >30: CPT | Performed by: HOSPITALIST

## 2022-08-31 RX ORDER — CETIRIZINE HYDROCHLORIDE 1 MG/ML
5 SOLUTION ORAL DAILY
Qty: 150 ML | Refills: 0 | Status: SHIPPED | COMMUNITY
Start: 2022-08-31

## 2022-08-31 RX ORDER — CETIRIZINE HYDROCHLORIDE 1 MG/ML
5 SOLUTION ORAL DAILY
Status: DISCONTINUED | OUTPATIENT
Start: 2022-08-31 | End: 2022-08-31

## 2022-08-31 RX ORDER — INSULIN ASPART 100 [IU]/ML
INJECTION, SOLUTION INTRAVENOUS; SUBCUTANEOUS
Qty: 36 ML | Refills: 1 | Status: SHIPPED | OUTPATIENT
Start: 2022-08-31

## 2022-08-31 RX ORDER — AMOXICILLIN AND CLAVULANATE POTASSIUM 600; 42.9 MG/5ML; MG/5ML
875 POWDER, FOR SUSPENSION ORAL 2 TIMES DAILY
Qty: 150 ML | Refills: 0 | Status: SHIPPED | OUTPATIENT
Start: 2022-08-31 | End: 2022-09-06

## 2022-08-31 NOTE — TELEPHONE ENCOUNTER
[facility] notified that they are out of network with patient's insurance and visit was denied.

## 2022-08-31 NOTE — PLAN OF CARE
Discharged  home via wheelchair  Accompanied by Support staff   Belongings taken by patient/family  PIV removed  Tele box removed & placed in the drawer  Discharge Navigator completed  Discharge instructions reviewed with patient a bedside  All questions & concerns addressed at his time.          Problem: Patient/Family Goals  Goal: Patient/Family Long Term Goal  Description: Patient's Long Term Goal:  discharge home    Interventions:  - IVF  -MD rounding  -PT/OT eval  -ST eval  - See additional Care Plan goals for specific interventions  Outcome: Progressing  Goal: Patient/Family Short Term Goal  Description: Patient's Short Term Goal:   8/28 noc: rest/sleep, eat  8/30: stay safe and comfortable   8/31: have a bowel movement     Interventions:   - Colace, suppositorium   -Fall and aspiration precaution , video swallow done   - Dark/quiet room  -Inpatient rounding.   - See additional Care Plan goals for specific interventions  Outcome: Progressing     Problem: GASTROINTESTINAL - ADULT  Goal: Maintains adequate nutritional intake (undernourished)  Description: INTERVENTIONS:  - Monitor percentage of each meal consumed  - Identify factors contributing to decreased intake, treat as appropriate  - Assist with meals as needed  - Monitor I&O, WT and lab values  - Obtain nutritional consult as needed  - Optimize oral hygiene and moisture  - Encourage food from home; allow for food preferences  - Enhance eating environment  Outcome: Progressing     Problem: Diabetes/Glucose Control  Goal: Glucose maintained within prescribed range  Description: INTERVENTIONS:  - Monitor Blood Glucose as ordered  - Assess for signs and symptoms of hyperglycemia and hypoglycemia  - Administer ordered medications to maintain glucose within target range  - Assess barriers to adequate nutritional intake and initiate nutrition consult as needed  - Instruct patient on self management of diabetes  Outcome: Progressing

## 2022-08-31 NOTE — PLAN OF CARE
Pt is admitted for PNA and weakness. Aspiration precaution. Pt is AOx4. Hx of ALS. Slurry  speech. Pt said he feels better  and stronger today. VSS, afebrile and denies any pain. SpO2 maintained on RA. . Denies any SOB, cough. Tele-NSR with BBB and PAC. Lovenox. Carb control diet/QID ACCU check. Soft diet and thin liquid. 1:1 feeder. Denies any n/v/d. Last bowel movement 6 days ago, gave PO Colace and suppositorium. Incontinent and primofit is on. Total lift. IV ABX. WCTM. Pt is updated with plan of care.       Problem: Patient/Family Goals  Goal: Patient/Family Long Term Goal  Description: Patient's Long Term Goal:  discharge home    Interventions:  - IVF  -MD rounding  -PT/OT kaur  -ST eval  - See additional Care Plan goals for specific interventions  Outcome: Progressing  Goal: Patient/Family Short Term Goal  Description: Patient's Short Term Goal:   8/28 noc: rest/sleep, eat  8/30: stay safe and comfortable   8/31: have a bowel movement     Interventions:   - Colace, suppositorium   -Fall and aspiration precaution , video swallow done   - Dark/quiet room  -Inpatient rounding.   - See additional Care Plan goals for specific interventions  Outcome: Progressing     Problem: GASTROINTESTINAL - ADULT  Goal: Maintains adequate nutritional intake (undernourished)  Description: INTERVENTIONS:  - Monitor percentage of each meal consumed  - Identify factors contributing to decreased intake, treat as appropriate  - Assist with meals as needed  - Monitor I&O, WT and lab values  - Obtain nutritional consult as needed  - Optimize oral hygiene and moisture  - Encourage food from home; allow for food preferences  - Enhance eating environment  Outcome: Progressing     Problem: Diabetes/Glucose Control  Goal: Glucose maintained within prescribed range  Description: INTERVENTIONS:  - Monitor Blood Glucose as ordered  - Assess for signs and symptoms of hyperglycemia and hypoglycemia  - Administer ordered medications to maintain glucose within target range  - Assess barriers to adequate nutritional intake and initiate nutrition consult as needed  - Instruct patient on self management of diabetes  Outcome: Progressing

## 2022-08-31 NOTE — PLAN OF CARE
Pt AOx4. 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 Slurred speech, soft spoken. Aspiration precautions. Dentures. VSS,afebrile. Maintaining O2 sats >90% on RA. Nebs. NSR on tele. Lovenox. Incont x2, primofit. No c/o pain, N/V/D. C/o constipation, scheduled med given. RLQ CGM, c/d/i. QID accucheck. Soft thin liquid diet. 1/1 feeder. Takes small pills in whole and big pills cut in half with water. IV abx. Pt updated on POC. Call light within reach, safety precautions in place. No further pt needs at this time.      Problem: Patient/Family Goals  Goal: Patient/Family Long Term Goal  Description: Patient's Long Term Goal:  discharge home    Interventions:  - IVF  -MD rounding  -PT/OT eval  -ST eval  - See additional Care Plan goals for specific interventions  Outcome: Progressing  Goal: Patient/Family Short Term Goal  Description: Patient's Short Term Goal:   8/28 noc: rest/sleep, eat  8/30: stay safe and comfortable     Interventions:   -Fall and aspiration precaution , video swallow done   - Dark/quiet room  -Inpatient rounding.   - See additional Care Plan goals for specific interventions  Outcome: Progressing     Problem: GASTROINTESTINAL - ADULT  Goal: Maintains adequate nutritional intake (undernourished)  Description: INTERVENTIONS:  - Monitor percentage of each meal consumed  - Identify factors contributing to decreased intake, treat as appropriate  - Assist with meals as needed  - Monitor I&O, WT and lab values  - Obtain nutritional consult as needed  - Optimize oral hygiene and moisture  - Encourage food from home; allow for food preferences  - Enhance eating environment  Outcome: Progressing     Problem: Diabetes/Glucose Control  Goal: Glucose maintained within prescribed range  Description: INTERVENTIONS:  - Monitor Blood Glucose as ordered  - Assess for signs and symptoms of hyperglycemia and hypoglycemia  - Administer ordered medications to maintain glucose within target range  - Assess barriers to adequate nutritional intake and initiate nutrition consult as needed  - Instruct patient on self management of diabetes  Outcome: Progressing

## 2022-08-31 NOTE — CM/SW NOTE
Pt is recommending Home; however, family wants an RN to come to the house. Referrals sent for [facility] RN - waiting for responses. SW to f/u with accepting [facility] agency. Order received for POLST. Family wants to review POLST form and then will work on completing it. POLST form given to the family.

## 2022-09-01 ENCOUNTER — TELEPHONE (OUTPATIENT)
Dept: FAMILY MEDICINE CLINIC | Facility: CLINIC | Age: 77
End: 2022-09-01

## 2022-09-01 ENCOUNTER — PATIENT OUTREACH (OUTPATIENT)
Dept: CASE MANAGEMENT | Age: 77
End: 2022-09-01

## 2022-09-01 DIAGNOSIS — E11.40 TYPE 2 DIABETES MELLITUS WITH DIABETIC NEUROPATHY, UNSPECIFIED WHETHER LONG TERM INSULIN USE (HCC): ICD-10-CM

## 2022-09-01 DIAGNOSIS — I48.0 PAROXYSMAL ATRIAL FIBRILLATION (HCC): Primary | ICD-10-CM

## 2022-09-01 DIAGNOSIS — Z02.9 ENCOUNTERS FOR UNSPECIFIED ADMINISTRATIVE PURPOSE: ICD-10-CM

## 2022-09-01 DIAGNOSIS — I10 ESSENTIAL HYPERTENSION: ICD-10-CM

## 2022-09-01 PROCEDURE — 1111F DSCHRG MED/CURRENT MED MERGE: CPT

## 2022-09-01 NOTE — CM/SW NOTE
[facility] accepted for [facility], notified [facility] pt discharged yesterday. SW spoke with [facility] liaison who stated she will reach out to pt's dtr for [facility].      TERI Ferguson  Discharge Planner

## 2022-09-01 NOTE — TELEPHONE ENCOUNTER
Daughter is  Calling back for an order for a nurse to check every afternoon to check sugar and give insulin and a hospital bed and a wheelchair that can go into the shower not just the toilet. They are requesting one with a mesh bottom.

## 2022-09-01 NOTE — HOME CARE LIAISON
Received referral via [facility] for Home Health services. Spoke w/ patients dtr and provided with list of [facility] providers from [address] [place] [address], choice is [facility]. Agency reserved in [address] [place] [address] and contact information placed on AVS.Financial interest disclosure provided. Notified MARIAM.

## 2022-09-01 NOTE — TELEPHONE ENCOUNTER
Verbal okay for HH left on Leilani confidential voicemail  Advised daughter to discuss with [facility] agency there concerns and needs and supplies. Explained to daughter elham need to rotate family or hire home nurse/caregiver to help manage insulin.      Daughter wanted me to ask Suellen Ram to look into wheelchair for shower- to hard to transfer to bench and a hospital bed

## 2022-09-01 NOTE — TELEPHONE ENCOUNTER
Yuly Mathews calling for a verbal to approve [facility] orders for a home nurse. Will fax once orders once she gets a verbal. Pt just discharged from [facility].

## 2022-09-02 ENCOUNTER — TELEPHONE (OUTPATIENT)
Dept: FAMILY MEDICINE CLINIC | Facility: CLINIC | Age: 77
End: 2022-09-02

## 2022-09-02 NOTE — TELEPHONE ENCOUNTER
Daughter called back and stated she needed Dr. Nish Edgar to call the  at [phone] to  the body due to [employer] reasons the body must be buried right away. Gave Dr. Nish Edgar the number and he  Called. Death certificate came via fax and Oswaldo Hernandez gave it to Dr. Nish Edgar to complete as stated below. Message sent to [facility].   For chart completion of info and card to be sent

## 2022-09-02 NOTE — TELEPHONE ENCOUNTER
Kayla Mercer calling to get verbal that Dr. Radha Desai will sign death cert for Pt. Pt passed 9/2/22 at 7:24a.

## 2022-09-06 NOTE — TELEPHONE ENCOUNTER
Dr. Ruperto Fernandezeker patient has appt with you tomorrow. He is in need of a hospital bed. Can you add this to your dictation so I can obtain a hospital bed for him. Does the patient require positioning of the body in ways not feasible with an ordinary bed due to a medical condition that is expected to last at least one month? Does the patient require for alleviation of pain positioning not feasible with an ordinary bed? Does the patient require the head of the bed to be elevated more than 30 degrees most of the time due to congestive heart failure, chronic pulmonary disease or aspiration? Does the patient require traction which can only be attached to a hospital bed? Does the patient require a bed height different than a fixed height hospital bed to permit transfers to chair, wheelchair or standing position? Does the patient require frequent changes in body position and or have an immediate need for a change in body position?

## 2022-10-14 NOTE — TELEPHONE ENCOUNTER
Ramps are covered, awaiting info from wife what type of ramp they need. Complex Repair And M Plasty Text: The defect edges were debeveled with a #15 scalpel blade.  The primary defect was closed partially with a complex linear closure.  Given the location of the remaining defect, shape of the defect and the proximity to free margins an M plasty was deemed most appropriate for complete closure of the defect.  Using a sterile surgical marker, an appropriate advancement flap was drawn incorporating the defect and placing the expected incisions within the relaxed skin tension lines where possible.    The area thus outlined was incised deep to adipose tissue with a #15 scalpel blade.  The skin margins were undermined to an appropriate distance in all directions utilizing iris scissors.

## 2022-11-17 NOTE — TELEPHONE ENCOUNTER
[facility] [address] [address]  Phone: [phone]  Fax: [phone]    November 17, 2022    Patient: Valarie Morales  MRN:  [id number]  YOB: 1977  Date of Visit: 11/17/2022    Dear Dr Martha Councilman: Thank you for the request for consultation for Elvin Turner. Below are the relevant portions of my assessment and plan of care. Assessment:  Pleasant 43-year-old female who presents for evaluation of a bulge at the level of the umbilicus which was noted within the last few months. She reports some symptoms of periumbilical discomfort. Physical examination reveals a small, fat-containing umbilical hernia. Plan:  Umbilical hernia repair. If you have questions, please do not hesitate to call me. I look forward to following Michelle Carlson along with you.     Sincerely,    Oleh Claude, MD    CC providers:    Gene Miranda MD  [address] [place] [address] [address] [place] [address] [address] You are scheduled for left ULNAR NERVE DECOMPRESSION TRANSPOSITION  on  02/03/2020 with Dr. Alicia Shaver.     Pre-op instructions discussed with patient:      · You will need preoperative labs. Orders have been placed.    · No blood thinning medications, over t

## 2022-12-30 NOTE — PROGRESS NOTES
Dx: ulnar neuropathy       Insurance (Authorized # of Visits):  25          Authorizing Physician: Dr. Lissa Albert  Next MD visit: none scheduled  Fall Risk: standard         Precautions: n/a              Progress Summary  Pt has attended 11 visits in Occupation Sacral wound dressing change again with some bloody drainage. I notified DR Bell continue to monitor.   your referral. If you have any questions, please contact me at [phone]    Sincerely,  Electronically signed by therapist: TEE Luciano/CARIE     [de-identified] certification required:  No      Certification From: 7/33/9024  To:12/12/2019   Date: 8

## 2023-12-05 NOTE — TELEPHONE ENCOUNTER
CLINIC VISIT  NOTE      HISTORY    Enrrique Partida is a 71 year old male who presents with his sister-in-law for follow-up of chronic problems, patient reports he is doing well, colostomy is functioning well, appetite is good  Chief Complaint   Patient presents with   • Follow-up        PAST MEDICAL HX:    CHF (congestive heart failure) (CMD)                          HTN (hypertension)                                            COPD (chronic obstructive pulmonary disease) (*                 Comment: Hypoxia    Pulmonary hypertension (CMD)                                    Comment: secondary to thromboembolic disease    Seizure disorder (CMD)                                        Deep vein thrombosis (CMD)                                      Comment: LLE    Cognitive impairment                                            Comment: Cognitive level of ~ 7 year old. Can't read,                understands simple explanations only    Syncope                                                       Chest pain                                                    Sleep apnea                                                     Comment: CPAP    Oxygen dependent                                                Comment: O2 2-3L [place]    Gout                                                          Lung mass                                                       Comment: Pulmonary nodules    Pneumonia                                                     Blood clot associated with vein wall inflammat*                 Comment: PEmultiple    Special educational needs                                       Comment: Doesn't read or write; always in special                education    CTEPH (chronic thromboembolic pulmonary hypert*               Chronic kidney disease (CKD)                                    Comment: Stage III as of 9/2018 per nephrology note    Long term current use of anticoagulant                          Comment: Warfarin     Son called back after discussion with patient  they wish to proceed with surgery  Plan for right ctr and ulnar decompression  Order placed  Please call pt and schedule surgery Patient has active power of  for healt*               Wears glasses                                                 BPH with obstruction/lower urinary tract sympt* 2021       Urinary tract infection                         2021       PAST SURGICAL HX:    PLACE CATH IN SVC IVC                           10/01/2007      Comment: IVC Filter placement    LUNG BIOPSY                                                   RIGHT HEART CATH WITH NITRIC OXIDE STUDY        2008 & *    SUNI - CV                                        9/10/2014*    ECHOCARDIOGRAM                                  Multiple      BRONCHOSCOPY,DIAGNOSTIC W LAVAGE                2012, 2*    RIGHT HEART CATH                                2018    CARDIAC SURGERY                                 2009      Comment: PFO closure and Bilateral pulmonary                thromboembolectomy  w/deep hypothermic                circulatory arrest.    BRONCHOSCOPY                                    2021      Comment: Unable to complete exam due to laryngeal spasms               and desaturation issues.    CYSTOSCOPY                                      2021      Comment: In office procedure - Dr. Gomez    UROLIFT TRANSPROSTATIC IMPLANT PROCEDURE        01/10/2022      Comment: Urology    Family History   Problem Relation Age of Onset   • Heart disease Father         MI   • Hypertension Father    • Heart disease Mother    • Stroke Mother      Review of patient's family status indicates:    Father                                             Comment: robert     Mother                                       57      Comment: robert     Sister                         Alive                     Sister                         Alive                     Brother                        Alive                     Brother                        Alive                       Social History     Socioeconomic History   • Marital  status: Single     Spouse name: Not on file   • Number of children: 0   • Years of education: Not on file   • Highest education level: Not on file   Occupational History     Comment: [employer] part time    Tobacco Use   • Smoking status: Former     Current packs/day: 0.00     Types: Cigarettes     Quit date: 2/10/1987     Years since quittin.8   • Smokeless tobacco: Never   Vaping Use   • Vaping Use: never used   Substance and Sexual Activity   • Alcohol use: No     Alcohol/week: 0.0 standard drinks of alcohol   • Drug use: No   • Sexual activity: Not on file   Other Topics Concern   • Not on file   Social History Narrative    He lived alone for a while but has been with his brother and his family for at least 20 years. 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 He didn't have any seizures when in childhood.  No children.     He works at [employer] 4 times a week. This is in [place].     Social Determinants of Health     Financial Resource Strain: Low Risk  (2022)    Financial Resource Strain    • Social Determinants: Financial Resource Strain: None   Food Insecurity: No Food Insecurity (2022)    Food Insecurity    • Social Determinants: Food Insecurity: Never   Transportation Needs: No Transportation Needs (2022)    PRAPARE - Transportation    • Lack of Transportation (Medical): No    • Lack of Transportation (Non-Medical): No   Physical Activity: Not on file   Stress: Not on file   Social Connections: Socially Isolated (2022)    Social Connections    • Social Determinants: Social Connections: Less than once a week   Intimate Partner Violence: Not At Risk (11/10/2022)    Intimate Partner Violence    • Social Determinants: Intimate Partner Violence Past Fear: No    • Social Determinants: Intimate Partner Violence Current Fear: No       I have reviewed the past medical, family and social history sections including the medications and allergies listed in the above medical  record as well as the nursing notes.     REVIEW OF SYSTEMS      Constitutional:  Patient denies fever, chills, tiredness or malaise.    Eyes:  Denies change in visual acuity, pain, burning or itching.    Immunologic:  Denies hives, seasonal allergies.    HENT:  Denies sinus problems, ear infections, nasal congestion or sore throat.    Respiratory:  Denies cough, shortness of breath.    Cardiovascular:  Denies chest pain, edema.    Gastrointestinal:  Denies abdominal pain, nausea, vomiting, bloody stools or diarrhea.    Genitourinary:  Denies urine retention, painful urination, urinary frequency, blood in urine or nocturia.    Musculoskeletal:  Denies back pain, neck pain, joint pain or leg swelling.    Integument:  Denies rash, itching.    Neurologic:  Denies headache, focal weakness or sensory changes.    Endocrine:  Denies polyuria, polydipsia or temperature intolerance.    Lymphatic:  Denies swollen glands, weight loss.    All other systems reviewed and negative      PHYSICAL EXAM    Vital Signs:    Vitals:    12/05/23 1212   BP: 112/62   BP Location: LUE - Left upper extremity   Patient Position: Sitting   Cuff Size: Regular   Pulse: 87   Resp: (!) 20   Temp: 97.7 °F (36.5 °C)   TempSrc: Oral   SpO2: 96%   Weight: 79.6 kg (175 lb 6.4 oz)   Height: 5' 9\" (1.753 m)     Constitutional: Appears stated age, alert and oriented x 3, well built, not in distress.    Integument:  Warm. Dry. No erythema. No rash.    HENT:  Normocephalic. Atraumatic. Bilateral external ears - no scars, discharge, lesions or masses. Oropharynx moist. No oral exudates. Nose normal.   Neck: . No tenderness. Supple. No stridor.  No goiter.  Eyes:  Pupils equal, round and reactive to light. Extraocular movements intact. Conjunctivae normal. No discharge.    Cardiovascular:  Normal heart rate. regular rhythm. S1 and s2 normal ,  No murmurs. No rubs. No gallops.    Respiratory:  Normal breath sounds air entry equal bilaterally. No respiratory  distress. No wheezing or crepitations. No chest tenderness.    Gastrointestinal:  Bowel sounds normal. Soft, no distension . No tenderness, liver and spleen not enlarged. No masses. No pulsatile masses.    Neurologic:  Alert & oriented x 3. Normal motor function. Normal sensory function. No focal deficits noted.      ASSESSMENT & PLAN    Enrrique was seen today for follow-up.    Diagnoses and all orders for this visit:    Chronic diastolic heart failure (CMD), well compensated continue on furosemide    Chronic thromboembolic pulmonary hypertension (CMS/HCC) able continue on apixaban    Malignant neoplasm of descending colon (CMS/HCC) => Palliative colostomy (11/11/2022 Vani), status post colostomy, continue on palliative care    COPD, severe (CMD) stable continue oxygen and current inhalers    Seizure (CMD), controlled continue on Lamictal and Keppra

## (undated) DIAGNOSIS — G62.9 PERIPHERAL NERVE DISORDER: ICD-10-CM

## (undated) DIAGNOSIS — G12.21 AMYOTROPHIC LATERAL SCLEROSIS (HCC): Primary | ICD-10-CM

## (undated) DIAGNOSIS — M62.81 MUSCLE WEAKNESS (GENERALIZED): ICD-10-CM

## (undated) DIAGNOSIS — L29.9 ITCHING: ICD-10-CM

## (undated) DIAGNOSIS — R29.898 DEFICIENCIES OF LIMBS: ICD-10-CM

## (undated) DIAGNOSIS — G62.9 NEUROPATHY: ICD-10-CM

## (undated) DIAGNOSIS — L29.9 ITCHING: Primary | ICD-10-CM

## (undated) DIAGNOSIS — R29.898 MUSCLE FUNCTION LOSS: ICD-10-CM

## (undated) DEVICE — GLOVE BIOGEL M SURG SZ 8

## (undated) DEVICE — GOWN SURG AERO CHROME XXL

## (undated) DEVICE — DRAPE HALF 40X58 DYNJP2410

## (undated) DEVICE — TRANSPOSAL ULTRAFLEX DUO/QUAD ULTRA CART MANIFOLD

## (undated) DEVICE — REM POLYHESIVE ADULT PATIENT RETURN ELECTRODE: Brand: VALLEYLAB

## (undated) DEVICE — GAUZE SPONGES,12 PLY: Brand: CURITY

## (undated) DEVICE — NEEDLE CONTRAST INTERJECT 25G

## (undated) DEVICE — #10 STERILE BLADE: Brand: POLYMER COATED BLADES

## (undated) DEVICE — SOL  .9 1000ML BTL

## (undated) DEVICE — SOLUTION SURG DURA PREP HAZMAT

## (undated) DEVICE — LIGHT HANDLE

## (undated) DEVICE — SUTURE VICRYL 3-0 RB-1

## (undated) DEVICE — GLOVE BIOGEL ORTHO SZ 8

## (undated) DEVICE — STANDARD HYPODERMIC NEEDLE,POLYPROPYLENE HUB: Brand: MONOJECT

## (undated) DEVICE — FORCEP BIOPSY RJ4 LG CAP W/ND

## (undated) DEVICE — #15 STERILE STAINLESS BLADE: Brand: STERILE STAINLESS BLADES

## (undated) DEVICE — BANDAGE ROLL,100% COTTON, 6 PLY, LARGE: Brand: KERLIX

## (undated) DEVICE — ENDOSCOPY PACK - LOWER: Brand: MEDLINE INDUSTRIES, INC.

## (undated) DEVICE — UPPER EXTREMITY CDS-LF: Brand: MEDLINE INDUSTRIES, INC.

## (undated) DEVICE — MARKER SKIN 2 TIP

## (undated) DEVICE — BANDAGE ELASTIC ACE 3IN STERIL

## (undated) DEVICE — DISPOSABLE BIPOLAR FORCEPS 4" (10.2CM) JEWELERS, STRAIGHT 0.4MM TIP AND 12 FT. (3.6M) CABLE: Brand: KIRWAN

## (undated) DEVICE — GAUZE SPONGES,8 PLY: Brand: CURITY

## (undated) DEVICE — ALCOHOL 70% 4 OZ

## (undated) DEVICE — DERMABOND LIQUID ADHESIVE

## (undated) DEVICE — SPONGE RAYTEC 4X4 RF DETECT

## (undated) DEVICE — NON-ADHERENT PAD PREPACK: Brand: TELFA

## (undated) DEVICE — SUTURE VICRYL 2-0 CT-2

## (undated) DEVICE — KENDALL SCD EXPRESS SLEEVES, KNEE LENGTH, MEDIUM: Brand: KENDALL SCD

## (undated) DEVICE — SNARE 9MM 230CM 2.4MM EXACTO

## (undated) DEVICE — GAMMEX® NON-LATEX PI TEXTURED SIZE 8, STERILE POLYISOPRENE POWDER-FREE SURGICAL GLOVE: Brand: GAMMEX

## (undated) DEVICE — 1200CC GUARDIAN II: Brand: GUARDIAN

## (undated) DEVICE — SKIN AFFIX .4ML

## (undated) DEVICE — CASED DISP BIPOLAR CORD

## (undated) DEVICE — INTENDED TO BE USED TO OCCLUDE, RETRACT AND IDENTIFY ARTERIES, VEINS, TENDONS AND NERVES IN SURGICAL PROCEDURES: Brand: STERION®  VESSEL LOOP

## (undated) DEVICE — 3M™ RED DOT™ MONITORING ELECTRODE WITH FOAM TAPE AND STICKY GEL, 50/BAG, 20/CASE, 72/PLT 2570: Brand: RED DOT™

## (undated) DEVICE — TRAP 4 CPTR CHMBR N EZ INLN

## (undated) DEVICE — BIPOLAR FORCEPS CORD,BANANA LEADS: Brand: VALLEYLAB

## (undated) DEVICE — Device: Brand: DEFENDO AIR/WATER/SUCTION AND BIOPSY VALVE

## (undated) DEVICE — CLIP LGT 11MM OPEN 2.8MM 235CM

## (undated) DEVICE — SUTURE ETHILON 3-0 PS-2

## (undated) DEVICE — SUTURE VICRYL 0 CT-1

## (undated) DEVICE — ORISE GEL

## (undated) DEVICE — FILTERLINE NASAL ADULT O2/CO2

## (undated) NOTE — LETTER
Patient Name: Alex Jacob  YOB: 1945          MRN number:  [id number]  Date:  9/13/2019  Referring Physician:  [id number] Summary  Pt has attended 8 visits in Occupational Therapy.    Subjective: \"I've been so busy with Will continue to upgrade goals PRN. Plan: Continue skilled Occupational Therapy 1-2 x/week or a total of 12 visits over a 90 day period. Treatment will include: there ex, neuromuscular re-ed, MT, there act.  Pt may benefit from NMES for muscular retra

## (undated) NOTE — MR AVS SNAPSHOT
After Visit Summary   1/9/2020    Tariq FAITH Deng    MRN: [id number]           Visit Information     Date & Time  1/9/2020  1:00 PM Provider  Galen Jeans, MD Department  [address] [place] [address] Dept.  Phone  [phone]      Allergies as o Educational Information    Healthy Diet and Regular Exercise  [employer] [facility] [employer] for making healthy food choices    Enjoy your food, but eat less. Fully enjoy your food when eating. Don’t eat while distracted and slow down.    Avoid ov experience and are looking for ways to make improvements. Your feedback will help us do so. For more information on [employer], please visit [email or web address]                   DO YOU KNOW WHERE TO GO?   Injury & illness are neve *Cost varies based on your insurance coverage  For more information about hours, locations or appointment options available at [facility],   visit [email or web address] or call [phone]. ([phone])

## (undated) NOTE — LETTER
Patient Name: Aruna Gallagher  YOB: 1945          MRN number:  [id number]  Date:  11/26/2019  Referring Physician:  Abdul Ganser    Discharge Summary  Pt has attended 21 visits in Physical Therapy.      Dx: ulnar neuropathy R elbow, cervi so limited that he is unable to attain AROM against gravity to point of pain. He is extremely quick to fatigue and usually unable to recover available strength within session to continue.  His limitations are not consistent with frozen shoulder as PROM is s

## (undated) NOTE — Clinical Note
TCM call completeed. AN appointment is scheduled for 9/7/2022. The spouse reported difficulty breathing at home to the [facility] and prefers to continue monitoring symptoms closely at home instead of having the patient return to the ED. NCM did review signs/symptoms that would require he patient to return to the ED and a TE was sent to triage. Thank you.

## (undated) NOTE — LETTER
2018      RE: Olivia Maloney     : 1945    Dear Dr. Isela Mccullough,    This letter is to inform you that your patient is being scheduled for surgery with Dr. Giselle Deluca on 18 at [facility]. We have asked the patient to contact your office

## (undated) NOTE — LETTER
Patient Name: Ani Escobar  YOB: 1945          MRN number:  [id number]  Date:  11/21/2018  Referring Physician:  Sonido Cleary     Discharge Summary    Pt has attended 7, cancelled0, and no shown 0 visits in Occupational Therapy.    Sub Extension: R=56, (+1) L=60 (+5)   Ulnar Deviation: R=25  L=25   Radial Deviation R =1 4 (+4), L 20       AROM/PROM:(Degrees)  RIGHT HAND:    Thumb IF MF RF SF   MP 0-58 0-80 0-83 0-83 0-84   PIP 0-67 0-80 0-88 20-88 35-83   DIP   0-52 0-61 0-60 15-71   [place]

## (undated) NOTE — LETTER
19    RE: Laura Macias     : 1945    Dear Dr. Rah Galloway    This letter is to inform you that your patient is being scheduled for surgery with Dr. Oriana Ríos on  at [facility]. We have asked the patient to contact your office to

## (undated) NOTE — LETTER
[facility]  Testing Department  Phone: [phone]  OUTSIDE TESTING RESULT REQUEST      TO:   Dr. Magaly Quintanilla Date: 6/1/18    FAX #: [phone]     IMPORTANT: FOR YOUR IMMEDIATE ATTENTION  Please FAX all test results listed below to: [phone]

## (undated) NOTE — LETTER
Patient Name: Alex Jacob  YOB: 1945          MRN number:  [id number]  Date:  12/20/2018  Referring Physician:  Rogers Chung     Progress/Discharge Summary  Dx: Cervical spondylosis with radiculopathy           Authorized # of Reina Arrieta in hand. Shoulder AROM and strength is now [facility], but will likely continue to improve with HEP. At this time, all goals have been met and patient has been compliant with HEP. Plan to hold at this time per plan as listed below.      Objective:   Deep neck flex Thank you for your referral. If you have any questions, please contact me at Dept: [phone].     Sincerely,  Electronically signed by therapist: Can Sue, PT, DPT

## (undated) NOTE — ED AVS SNAPSHOT
Alfonso Larkin   MRN: [id number]    Department:  [facility] Emergency Department   Date of Visit:  2/7/2020           Disclosure     Insurance plans vary and the physician(s) referred by the ER may not be covered by your plan.  Please contact y tell this physician (or your personal doctor if your instructions are to return to your personal doctor) about any new or lasting problems. The primary care or specialist physician will see patients referred from the [facility] Emergency Department.  Jeronimo Doan

## (undated) NOTE — LETTER
[facility] [address], [address]  Consent for Procedure/Sedation    Date: 10/23/2020    Time: 1335      1. I authorize the performance upon Soledad Gann the following:  Cardioversion      2.  I authorize Dr. Manny Steve  (and who Printed: 10/23/2020   1:40 PM  Patient Name: Terrell Linares        : 1945       Medical Record #: [id number]

## (undated) NOTE — LETTER
Patient Name: Indy Moran  YOB: 1945          MRN number:  [id number]  Date:  11/14/2019  Referring Physician:  Petra Ross    Discharge Summary    Number of Visits Attended 25 in Occupational Therapy    Dear Dr. Dave Pugh,    Discharge New: Pt will increase digit HAMLIN to be at least 200 deg at D3-5 for improved grasp of a glass. Will continue to upgrade goals PRN. Plan: D/C pt to HEP only at this time.  Pt encouraged to obtain order for re-evaluation in 2-3 months as nerve regener